# Patient Record
Sex: FEMALE | Race: WHITE | HISPANIC OR LATINO | Employment: OTHER | URBAN - METROPOLITAN AREA
[De-identification: names, ages, dates, MRNs, and addresses within clinical notes are randomized per-mention and may not be internally consistent; named-entity substitution may affect disease eponyms.]

---

## 2017-03-07 ENCOUNTER — ALLSCRIPTS OFFICE VISIT (OUTPATIENT)
Dept: OTHER | Facility: OTHER | Age: 82
End: 2017-03-07

## 2017-09-12 ENCOUNTER — ALLSCRIPTS OFFICE VISIT (OUTPATIENT)
Dept: OTHER | Facility: OTHER | Age: 82
End: 2017-09-12

## 2018-01-12 VITALS — WEIGHT: 107.5 LBS | DIASTOLIC BLOOD PRESSURE: 82 MMHG | SYSTOLIC BLOOD PRESSURE: 112 MMHG | HEART RATE: 88 BPM

## 2018-01-13 VITALS
HEART RATE: 76 BPM | HEIGHT: 59 IN | DIASTOLIC BLOOD PRESSURE: 80 MMHG | SYSTOLIC BLOOD PRESSURE: 140 MMHG | WEIGHT: 102.38 LBS | BODY MASS INDEX: 20.64 KG/M2

## 2018-04-20 RX ORDER — MEMANTINE HYDROCHLORIDE 28 MG/1
CAPSULE, EXTENDED RELEASE ORAL
COMMUNITY
End: 2019-10-27 | Stop reason: HOSPADM

## 2018-04-20 RX ORDER — GABAPENTIN 300 MG/1
1 CAPSULE ORAL
Status: ON HOLD | COMMUNITY
End: 2019-10-25 | Stop reason: ALTCHOICE

## 2018-04-20 RX ORDER — EZETIMIBE 10 MG/1
TABLET ORAL
Refills: 0 | COMMUNITY
Start: 2018-02-13 | End: 2021-01-01 | Stop reason: HOSPADM

## 2018-04-20 RX ORDER — MEMANTINE HYDROCHLORIDE 28 MG/1
CAPSULE, EXTENDED RELEASE ORAL
Refills: 0 | COMMUNITY
Start: 2018-02-15 | End: 2019-10-27 | Stop reason: HOSPADM

## 2018-04-20 RX ORDER — MIRABEGRON 25 MG/1
TABLET, FILM COATED, EXTENDED RELEASE ORAL
Refills: 0 | COMMUNITY
Start: 2018-02-06 | End: 2020-01-01 | Stop reason: HOSPADM

## 2018-04-20 RX ORDER — LEVOTHYROXINE SODIUM 0.05 MG/1
TABLET ORAL
Refills: 0 | COMMUNITY
Start: 2018-01-11 | End: 2021-01-01 | Stop reason: HOSPADM

## 2018-04-20 RX ORDER — CARBIDOPA, LEVODOPA AND ENTACAPONE 37.5; 200; 15 MG/1; MG/1; MG/1
TABLET, FILM COATED ORAL
Refills: 0 | COMMUNITY
Start: 2018-03-26 | End: 2020-01-01 | Stop reason: HOSPADM

## 2018-04-20 RX ORDER — LOSARTAN POTASSIUM 100 MG/1
1 TABLET ORAL DAILY
COMMUNITY
End: 2019-10-27 | Stop reason: HOSPADM

## 2018-04-20 RX ORDER — ASPIRIN 81 MG/1
1 TABLET ORAL DAILY
COMMUNITY
Start: 2015-02-24 | End: 2021-01-01 | Stop reason: HOSPADM

## 2018-04-20 RX ORDER — LOSARTAN POTASSIUM 50 MG/1
50 TABLET ORAL DAILY
Refills: 0 | COMMUNITY
Start: 2018-01-11 | End: 2021-01-01 | Stop reason: HOSPADM

## 2018-04-20 RX ORDER — GABAPENTIN 300 MG/1
CAPSULE ORAL
Refills: 0 | COMMUNITY
Start: 2018-01-11 | End: 2018-04-20

## 2018-04-20 RX ORDER — LINACLOTIDE 145 UG/1
CAPSULE, GELATIN COATED ORAL
Refills: 0 | COMMUNITY
Start: 2018-02-05 | End: 2018-04-24

## 2018-04-20 RX ORDER — ALENDRONATE SODIUM 70 MG/1
1 TABLET ORAL WEEKLY
COMMUNITY
End: 2021-01-01 | Stop reason: HOSPADM

## 2018-04-20 RX ORDER — LEVOTHYROXINE SODIUM 0.07 MG/1
1 TABLET ORAL DAILY
COMMUNITY
End: 2019-10-27 | Stop reason: HOSPADM

## 2018-04-24 ENCOUNTER — OFFICE VISIT (OUTPATIENT)
Dept: GASTROENTEROLOGY | Facility: CLINIC | Age: 83
End: 2018-04-24
Payer: MEDICARE

## 2018-04-24 VITALS
WEIGHT: 105.5 LBS | SYSTOLIC BLOOD PRESSURE: 126 MMHG | BODY MASS INDEX: 21.31 KG/M2 | DIASTOLIC BLOOD PRESSURE: 88 MMHG | HEART RATE: 71 BPM

## 2018-04-24 DIAGNOSIS — K59.01 SLOW TRANSIT CONSTIPATION: ICD-10-CM

## 2018-04-24 DIAGNOSIS — Z09 FOLLOW UP: Primary | ICD-10-CM

## 2018-04-24 PROCEDURE — 99214 OFFICE O/P EST MOD 30 MIN: CPT | Performed by: INTERNAL MEDICINE

## 2018-04-24 NOTE — PROGRESS NOTES
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 and her parkinsonism  Her daughter does not want her to take Linzess anymore and wants her to be treated with natural medications  1 we are stopping her Linzess    2 she can take Senokot or aloe if needed in the future    She does not need colonoscopy surveillance at her age     Reason:  Constipation    HPI:  She is an 49-year-old female here for slow motility constipation  This has been well managed for years with Linzess  She does still had irregular stools with this medication but no straining or hard stools and she was comfortable  Her daughter stopped it because she does not want her to be treated with too many chemicals per her were doing  She does not have any melena or hematochezia  She is eating  She has poor motility with a shuffling gait related to her Parkinson's disease  She appears to be comfortable and having bowel movements that are not hard or 2 large off of the medication  We did discuss natural treatments  We discussed no colonoscopy at her age  She is eating well  REVIEW OF SYSTEMS:     CONSTITUTIONAL: Denies any fever, chills, or rigors  Good appetite, and no recent weight loss  HEENT: No earache or tinnitus  Denies hearing loss or visual disturbances  CARDIOVASCULAR: No chest pain or palpitations  RESPIRATORY: Denies any cough, hemoptysis, shortness of breath or dyspnea on exertion  GASTROINTESTINAL: As noted in the History of Present Illness  GENITOURINARY: No problems with urination  Denies any hematuria or dysuria  NEUROLOGIC: No dizziness or vertigo, denies headaches  MUSCULOSKELETAL: Denies any muscle or joint pain  SKIN: Denies skin rashes or itching  ENDOCRINE: Denies excessive thirst  Denies intolerance to heat or cold    PSYCHOSOCIAL: Denies depression or anxiety  Denies any recent memory loss  Past Medical History:   Diagnosis Date    Constipation     Hypertension     Parkinson disease ([address] )       Past Surgical History:   Procedure Laterality Date    BASAL CELL CARCINOMA EXCISION      above lip    BREAST CYST EXCISION      COLONOSCOPY      HYSTERECTOMY      ROTATOR CUFF REPAIR Right      Social History     Social History    Marital status:      Spouse name: N/A    Number of children: N/A    Years of education: N/A     Occupational History    Not on file  Social History Main Topics    Smoking status: Never Smoker    Smokeless tobacco: Never Used    Alcohol use No    Drug use: No    Sexual activity: Not on file     Other Topics Concern    Not on file     Social History Narrative    No narrative on file     Family History   Problem Relation Age of Onset    Heart attack Mother      Patient has no known allergies  Current Outpatient Prescriptions   Medication Sig Dispense Refill    alendronate (FOSAMAX) 70 mg tablet Take 1 tablet by mouth once a week      aspirin (ASPIR-LOW) 81 mg EC tablet Take 1 tablet by mouth daily      carbidopa-levodopa-entacapone (STALEVO) 37 5-150-200 MG per tablet   0    gabapentin (NEURONTIN) 300 mg capsule Take 1 capsule by mouth      levothyroxine 50 mcg tablet   0    levothyroxine 75 mcg tablet Take 1 tablet by mouth daily      losartan (COZAAR) 100 MG tablet Take 1 tablet by mouth daily      losartan (COZAAR) 50 mg tablet Take 50 mg by mouth daily  0    Memantine HCl ER (NAMENDA XR) 28 MG CP24 Take by mouth      Mirabegron ER (MYRBETRIQ) 50 MG TB24 Take 1 tablet by mouth daily      MYRBETRIQ 25 MG TB24   0    NAMENDA XR 28 MG CP24   0    rotigotine (NEUPRO) 1 MG/24HR transdermal patch Place 1 patch on the skin daily      ZETIA 10 MG tablet   0     No current facility-administered medications for this visit          Blood pressure 126/88, pulse 71, weight 47 9 kg (105 lb 8 oz)     PHYSICAL EXAM:     General Appearance:   Alert, cooperative, no distress, appears stated age    HEENT:   Normocephalic, atraumatic, anicteric      Neck:  Supple, symmetrical, trachea midline, no adenopathy;    thyroid: no enlargement/tenderness/nodules; no carotid  bruit or JVD    Lungs:   Clear to auscultation bilaterally; no rales, rhonchi or wheezing; respirations unlabored    Heart[de-identified]   S1 and S2 normal; regular rate and rhythm; no murmur, rub, or gallop     Abdomen:   Soft, non-tender, non-distended; normal bowel sounds; no masses, no organomegaly    Genitalia:   Deferred    Rectal:   Deferred    Extremities:  No cyanosis, clubbing or edema    Pulses:  2+ and symmetric all extremities    Skin:  Skin color, texture, turgor normal, no rashes or lesions    Lymph nodes:  No palpable cervical, axillary or inguinal lymphadenopathy        Lab Results   Component Value Date    WBC 4 1 (L) 01/14/2015    HGB 15 5 01/14/2015    HCT 47 0 (H) 01/14/2015    MCV 96 01/14/2015     01/14/2015     Lab Results   Component Value Date    GLUCOSE 79 01/14/2015    CALCIUM 9 4 01/14/2015     01/14/2015    K 4 1 01/14/2015    CO2 30 9 01/14/2015     01/14/2015    BUN 17 01/14/2015    CREATININE 0 6 01/14/2015     Lab Results   Component Value Date    ALT 12 01/14/2015    AST 21 01/14/2015    ALKPHOS 103 01/14/2015    BILITOT 0 5 01/14/2015     No results found for: INR, PROTIME

## 2018-04-24 NOTE — LETTER
April 24, 2018     Jo Chou MD  [facility]  [address] [place] [address]    Patient: [facility]   YOB: 1934   Date of Visit: 4/24/2018       Dear Dr Roberson Corners:    Thank you for referring Ja Almeida to me for evaluation  Below are my notes for this consultation  If you have questions, please do not hesitate to call me  I look forward to following your patient along with you  Sincerely,        Roshan Atkinson MD        CC: No Recipients  Roshan Atkinson MD  4/24/2018 11:57 AM  Sign at close encounter  Follow-up Note - [facility]  [facility] 1934 80 y o  female     ASSESSMENT @ PLAN:   She is an 80-year-old female with 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 and her parkinsonism  Her daughter does not want her to take Linzess anymore and wants her to be treated with natural medications  1 we are stopping her Linzess    2 she can take Senokot or aloe if needed in the future    She does not need colonoscopy surveillance at her age     Reason:  Constipation    HPI:  She is an 80-year-old female here for slow motility constipation  This has been well managed for years with Linzess  She does still had irregular stools with this medication but no straining or hard stools and she was comfortable  Her daughter stopped it because she does not want her to be treated with too many chemicals per her were doing  She does not have any melena or hematochezia  She is eating  She has poor motility with a shuffling gait related to her Parkinson's disease  She appears to be comfortable and having bowel movements that are not hard or 2 large off of the medication  We did discuss natural treatments  We discussed no colonoscopy at her age  She is eating well  REVIEW OF SYSTEMS:     CONSTITUTIONAL: Denies any fever, chills, or rigors  Good appetite, and no recent weight loss    HEENT: No earache or tinnitus  Denies hearing loss or visual disturbances  CARDIOVASCULAR: No chest pain or palpitations  RESPIRATORY: Denies any cough, hemoptysis, shortness of breath or dyspnea on exertion  GASTROINTESTINAL: As noted in the History of Present Illness  GENITOURINARY: No problems with urination  Denies any hematuria or dysuria  NEUROLOGIC: No dizziness or vertigo, denies headaches  MUSCULOSKELETAL: Denies any muscle or joint pain  SKIN: Denies skin rashes or itching  ENDOCRINE: Denies excessive thirst  Denies intolerance to heat or cold  PSYCHOSOCIAL: Denies depression or anxiety  Denies any recent memory loss  Past Medical History:   Diagnosis Date    Constipation     Hypertension     Parkinson disease ([facility] [address] )       Past Surgical History:   Procedure Laterality Date    BASAL CELL CARCINOMA EXCISION      above lip    BREAST CYST EXCISION      COLONOSCOPY      HYSTERECTOMY      ROTATOR CUFF REPAIR Right      Social History     Social History    Marital status:      Spouse name: N/A    Number of children: N/A    Years of education: N/A     Occupational History    Not on file  Social History Main Topics    Smoking status: Never Smoker    Smokeless tobacco: Never Used    Alcohol use No    Drug use: No    Sexual activity: Not on file     Other Topics Concern    Not on file     Social History Narrative    No narrative on file     Family History   Problem Relation Age of Onset    Heart attack Mother      Patient has no known allergies    Current Outpatient Prescriptions   Medication Sig Dispense Refill    alendronate (FOSAMAX) 70 mg tablet Take 1 tablet by mouth once a week      aspirin (ASPIR-LOW) 81 mg EC tablet Take 1 tablet by mouth daily      carbidopa-levodopa-entacapone (STALEVO) 37 5-150-200 MG per tablet   0    gabapentin (NEURONTIN) 300 mg capsule Take 1 capsule by mouth      levothyroxine 50 mcg tablet   0    levothyroxine 75 mcg tablet Take 1 tablet by mouth daily      losartan (COZAAR) 100 MG tablet Take 1 tablet by mouth daily      losartan (COZAAR) 50 mg tablet Take 50 mg by mouth daily  0    Memantine HCl ER (NAMENDA XR) 28 MG CP24 Take by mouth      Mirabegron ER (MYRBETRIQ) 50 MG TB24 Take 1 tablet by mouth daily      MYRBETRIQ 25 MG TB24   0    NAMENDA XR 28 MG CP24   0    rotigotine (NEUPRO) 1 MG/24HR transdermal patch Place 1 patch on the skin daily      ZETIA 10 MG tablet   0     No current facility-administered medications for this visit  Blood pressure 126/88, pulse 71, weight 47 9 kg (105 lb 8 oz)  PHYSICAL EXAM:     General Appearance:   Alert, cooperative, no distress, appears stated age    HEENT:   Normocephalic, atraumatic, anicteric      Neck:  Supple, symmetrical, trachea midline, no adenopathy;    thyroid: no enlargement/tenderness/nodules; no carotid  bruit or JVD    Lungs:   Clear to auscultation bilaterally; no rales, rhonchi or wheezing; respirations unlabored    Heart[de-identified]   S1 and S2 normal; regular rate and rhythm; no murmur, rub, or gallop     Abdomen:   Soft, non-tender, non-distended; normal bowel sounds; no masses, no organomegaly    Genitalia:   Deferred    Rectal:   Deferred    Extremities:  No cyanosis, clubbing or edema    Pulses:  2+ and symmetric all extremities    Skin:  Skin color, texture, turgor normal, no rashes or lesions    Lymph nodes:  No palpable cervical, axillary or inguinal lymphadenopathy        Lab Results   Component Value Date    WBC 4 1 (L) 01/14/2015    HGB 15 5 01/14/2015    HCT 47 0 (H) 01/14/2015    MCV 96 01/14/2015     01/14/2015     Lab Results   Component Value Date    GLUCOSE 79 01/14/2015    CALCIUM 9 4 01/14/2015     01/14/2015    K 4 1 01/14/2015    CO2 30 9 01/14/2015     01/14/2015    BUN 17 01/14/2015    CREATININE 0 6 01/14/2015     Lab Results   Component Value Date    ALT 12 01/14/2015    AST 21 01/14/2015    ALKPHOS 103 01/14/2015    BILITOT 0 5 01/14/2015 No results found for: INR, PROTIME

## 2019-10-25 ENCOUNTER — APPOINTMENT (EMERGENCY)
Dept: RADIOLOGY | Facility: HOSPITAL | Age: 84
End: 2019-10-25
Payer: MEDICARE

## 2019-10-25 ENCOUNTER — HOSPITAL ENCOUNTER (EMERGENCY)
Facility: HOSPITAL | Age: 84
End: 2019-10-25
Attending: EMERGENCY MEDICINE | Admitting: EMERGENCY MEDICINE
Payer: MEDICARE

## 2019-10-25 ENCOUNTER — APPOINTMENT (EMERGENCY)
Dept: CT IMAGING | Facility: HOSPITAL | Age: 84
End: 2019-10-25
Payer: MEDICARE

## 2019-10-25 ENCOUNTER — HOSPITAL ENCOUNTER (INPATIENT)
Facility: HOSPITAL | Age: 84
LOS: 2 days | Discharge: HOME | DRG: 158 | End: 2019-10-27
Attending: SURGERY | Admitting: SURGERY
Payer: MEDICARE

## 2019-10-25 VITALS
HEART RATE: 83 BPM | SYSTOLIC BLOOD PRESSURE: 105 MMHG | BODY MASS INDEX: 19.15 KG/M2 | TEMPERATURE: 98.2 F | OXYGEN SATURATION: 95 % | RESPIRATION RATE: 15 BRPM | WEIGHT: 94.8 LBS | DIASTOLIC BLOOD PRESSURE: 63 MMHG

## 2019-10-25 DIAGNOSIS — S02.85XA CLOSED FRACTURE OF ORBIT, INITIAL ENCOUNTER (HCC): ICD-10-CM
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
## 2019-10-25 DIAGNOSIS — S02.85XA ORBITAL FRACTURE (HCC): ICD-10-CM

## 2019-10-25 DIAGNOSIS — S02.92XA EXTENSIVE FACIAL FRACTURES (HCC): Primary | ICD-10-CM

## 2019-10-25 LAB
ANION GAP SERPL CALCULATED.3IONS-SCNC: 4 MMOL/L (ref 4–13)
ANION GAP SERPL CALCULATED.3IONS-SCNC: 8 MMOL/L (ref 4–13)
APTT PPP: 27 SECONDS (ref 23–37)
BASOPHILS # BLD MANUAL: 0 THOUSAND/UL (ref 0–0.1)
BASOPHILS NFR MAR MANUAL: 0 % (ref 0–1)
BUN SERPL-MCNC: 30 MG/DL (ref 5–25)
BUN SERPL-MCNC: 31 MG/DL (ref 5–25)
CALCIUM SERPL-MCNC: 8.7 MG/DL (ref 8.3–10.1)
CALCIUM SERPL-MCNC: 9 MG/DL (ref 8.3–10.1)
CHLORIDE SERPL-SCNC: 107 MMOL/L (ref 100–108)
CHLORIDE SERPL-SCNC: 112 MMOL/L (ref 100–108)
CO2 SERPL-SCNC: 28 MMOL/L (ref 21–32)
CO2 SERPL-SCNC: 29 MMOL/L (ref 21–32)
CREAT SERPL-MCNC: 0.85 MG/DL (ref 0.6–1.3)
CREAT SERPL-MCNC: 1.05 MG/DL (ref 0.6–1.3)
EOSINOPHIL # BLD MANUAL: 0 THOUSAND/UL (ref 0–0.4)
EOSINOPHIL NFR BLD MANUAL: 0 % (ref 0–6)
ERYTHROCYTE [DISTWIDTH] IN BLOOD BY AUTOMATED COUNT: 13.2 % (ref 11.6–15.1)
GFR SERPL CREATININE-BSD FRML MDRD: 49 ML/MIN/1.73SQ M
GFR SERPL CREATININE-BSD FRML MDRD: 63 ML/MIN/1.73SQ M
GLUCOSE SERPL-MCNC: 113 MG/DL (ref 65–140)
GLUCOSE SERPL-MCNC: 165 MG/DL (ref 65–140)
HCT VFR BLD AUTO: 42.6 % (ref 34.8–46.1)
HGB BLD-MCNC: 13.6 G/DL (ref 11.5–15.4)
INR PPP: 1.22 (ref 0.84–1.19)
LYMPHOCYTES # BLD AUTO: 1.03 THOUSAND/UL (ref 0.6–4.47)
LYMPHOCYTES # BLD AUTO: 11 % (ref 14–44)
MCH RBC QN AUTO: 32.9 PG (ref 26.8–34.3)
MCHC RBC AUTO-ENTMCNC: 31.9 G/DL (ref 31.4–37.4)
MCV RBC AUTO: 103 FL (ref 82–98)
MONOCYTES # BLD AUTO: 0.19 THOUSAND/UL (ref 0–1.22)
MONOCYTES NFR BLD: 2 % (ref 4–12)
NEUTROPHILS # BLD MANUAL: 8.12 THOUSAND/UL (ref 1.85–7.62)
NEUTS SEG NFR BLD AUTO: 87 % (ref 43–75)
NRBC BLD AUTO-RTO: 0 /100 WBCS
PLATELET # BLD AUTO: 188 THOUSANDS/UL (ref 149–390)
PLATELET BLD QL SMEAR: ADEQUATE
PMV BLD AUTO: 9.9 FL (ref 8.9–12.7)
POTASSIUM SERPL-SCNC: 4.2 MMOL/L (ref 3.5–5.3)
POTASSIUM SERPL-SCNC: 5.2 MMOL/L (ref 3.5–5.3)
PROTHROMBIN TIME: 15 SECONDS (ref 11.6–14.5)
RBC # BLD AUTO: 4.13 MILLION/UL (ref 3.81–5.12)
SODIUM SERPL-SCNC: 144 MMOL/L (ref 136–145)
SODIUM SERPL-SCNC: 144 MMOL/L (ref 136–145)
TOTAL CELLS COUNTED SPEC: 100
WBC # BLD AUTO: 9.33 THOUSAND/UL (ref 4.31–10.16)

## 2019-10-25 PROCEDURE — 70450 CT HEAD/BRAIN W/O DYE: CPT

## 2019-10-25 PROCEDURE — 85027 COMPLETE CBC AUTOMATED: CPT | Performed by: EMERGENCY MEDICINE

## 2019-10-25 PROCEDURE — 80048 BASIC METABOLIC PNL TOTAL CA: CPT | Performed by: EMERGENCY MEDICINE

## 2019-10-25 PROCEDURE — 85730 THROMBOPLASTIN TIME PARTIAL: CPT | Performed by: EMERGENCY MEDICINE

## 2019-10-25 PROCEDURE — 99222 1ST HOSP IP/OBS MODERATE 55: CPT | Performed by: SURGERY

## 2019-10-25 PROCEDURE — 70486 CT MAXILLOFACIAL W/O DYE: CPT

## 2019-10-25 PROCEDURE — 1124F ACP DISCUSS-NO DSCNMKR DOCD: CPT | Performed by: SURGERY

## 2019-10-25 PROCEDURE — 73060 X-RAY EXAM OF HUMERUS: CPT

## 2019-10-25 PROCEDURE — 99285 EMERGENCY DEPT VISIT HI MDM: CPT | Performed by: EMERGENCY MEDICINE

## 2019-10-25 PROCEDURE — 85610 PROTHROMBIN TIME: CPT | Performed by: EMERGENCY MEDICINE

## 2019-10-25 PROCEDURE — 99285 EMERGENCY DEPT VISIT HI MDM: CPT

## 2019-10-25 PROCEDURE — 73130 X-RAY EXAM OF HAND: CPT

## 2019-10-25 PROCEDURE — 72125 CT NECK SPINE W/O DYE: CPT

## 2019-10-25 PROCEDURE — 36415 COLL VENOUS BLD VENIPUNCTURE: CPT | Performed by: EMERGENCY MEDICINE

## 2019-10-25 PROCEDURE — 99222 1ST HOSP IP/OBS MODERATE 55: CPT | Performed by: INTERNAL MEDICINE

## 2019-10-25 PROCEDURE — 73070 X-RAY EXAM OF ELBOW: CPT

## 2019-10-25 PROCEDURE — 85007 BL SMEAR W/DIFF WBC COUNT: CPT | Performed by: EMERGENCY MEDICINE

## 2019-10-25 RX ORDER — BACITRACIN, NEOMYCIN, POLYMYXIN B 400; 3.5; 5 [USP'U]/G; MG/G; [USP'U]/G
1 OINTMENT TOPICAL ONCE
Status: DISCONTINUED | OUTPATIENT
Start: 2019-10-25 | End: 2019-10-25 | Stop reason: HOSPADM

## 2019-10-25 RX ORDER — ACETAMINOPHEN 325 MG/1
975 TABLET ORAL EVERY 8 HOURS SCHEDULED
Status: DISCONTINUED | OUTPATIENT
Start: 2019-10-25 | End: 2019-10-27 | Stop reason: HOSPADM

## 2019-10-25 RX ORDER — LEVOTHYROXINE SODIUM 0.07 MG/1
75 TABLET ORAL DAILY
Status: DISCONTINUED | OUTPATIENT
Start: 2019-10-26 | End: 2019-10-27 | Stop reason: HOSPADM

## 2019-10-25 RX ORDER — ONDANSETRON 2 MG/ML
4 INJECTION INTRAMUSCULAR; INTRAVENOUS EVERY 4 HOURS PRN
Status: DISCONTINUED | OUTPATIENT
Start: 2019-10-25 | End: 2019-10-27 | Stop reason: HOSPADM

## 2019-10-25 RX ORDER — LOSARTAN POTASSIUM 50 MG/1
100 TABLET ORAL DAILY
Status: DISCONTINUED | OUTPATIENT
Start: 2019-10-26 | End: 2019-10-27 | Stop reason: HOSPADM

## 2019-10-25 RX ORDER — OXYCODONE HYDROCHLORIDE 5 MG/1
5 TABLET ORAL EVERY 4 HOURS PRN
Status: DISCONTINUED | OUTPATIENT
Start: 2019-10-25 | End: 2019-10-27 | Stop reason: HOSPADM

## 2019-10-25 RX ORDER — SODIUM CHLORIDE, SODIUM GLUCONATE, SODIUM ACETATE, POTASSIUM CHLORIDE, MAGNESIUM CHLORIDE, SODIUM PHOSPHATE, DIBASIC, AND POTASSIUM PHOSPHATE .53; .5; .37; .037; .03; .012; .00082 G/100ML; G/100ML; G/100ML; G/100ML; G/100ML; G/100ML; G/100ML
50 INJECTION, SOLUTION INTRAVENOUS CONTINUOUS
Status: DISCONTINUED | OUTPATIENT
Start: 2019-10-25 | End: 2019-10-26

## 2019-10-25 RX ORDER — CARBIDOPA, LEVODOPA AND ENTACAPONE 37.5; 200; 15 MG/1; MG/1; MG/1
1 TABLET, FILM COATED ORAL DAILY
Status: DISCONTINUED | OUTPATIENT
Start: 2019-10-26 | End: 2019-10-26 | Stop reason: CLARIF

## 2019-10-25 RX ORDER — OXYBUTYNIN CHLORIDE 10 MG/1
10 TABLET, EXTENDED RELEASE ORAL DAILY
Status: DISCONTINUED | OUTPATIENT
Start: 2019-10-26 | End: 2019-10-27 | Stop reason: HOSPADM

## 2019-10-25 RX ORDER — HYDROMORPHONE HCL/PF 1 MG/ML
0.2 SYRINGE (ML) INJECTION EVERY 4 HOURS PRN
Status: DISCONTINUED | OUTPATIENT
Start: 2019-10-25 | End: 2019-10-27 | Stop reason: HOSPADM

## 2019-10-25 RX ORDER — POLYETHYLENE GLYCOL 3350 17 G/17G
17 POWDER, FOR SOLUTION ORAL DAILY PRN
Status: DISCONTINUED | OUTPATIENT
Start: 2019-10-25 | End: 2019-10-27 | Stop reason: HOSPADM

## 2019-10-25 RX ORDER — MEMANTINE HYDROCHLORIDE 28 MG/1
14 CAPSULE, EXTENDED RELEASE ORAL DAILY
COMMUNITY
Start: 2019-04-02 | End: 2021-01-01 | Stop reason: HOSPADM

## 2019-10-25 RX ORDER — AMOXICILLIN 250 MG
1 CAPSULE ORAL 2 TIMES DAILY
Status: DISCONTINUED | OUTPATIENT
Start: 2019-10-25 | End: 2019-10-27 | Stop reason: HOSPADM

## 2019-10-25 RX ORDER — DOCUSATE SODIUM 100 MG/1
100 CAPSULE, LIQUID FILLED ORAL 2 TIMES DAILY
Status: DISCONTINUED | OUTPATIENT
Start: 2019-10-25 | End: 2019-10-27 | Stop reason: HOSPADM

## 2019-10-25 RX ORDER — OXYCODONE HYDROCHLORIDE 5 MG/1
2.5 TABLET ORAL EVERY 4 HOURS PRN
Status: DISCONTINUED | OUTPATIENT
Start: 2019-10-25 | End: 2019-10-27 | Stop reason: HOSPADM

## 2019-10-25 RX ADMIN — SODIUM CHLORIDE, SODIUM GLUCONATE, SODIUM ACETATE, POTASSIUM CHLORIDE AND MAGNESIUM CHLORIDE 50 ML/HR: 526; 502; 368; 37; 30 INJECTION, SOLUTION INTRAVENOUS at 21:53

## 2019-10-25 RX ADMIN — SENNOSIDES AND DOCUSATE SODIUM 1 TABLET: 8.6; 5 TABLET ORAL at 21:52

## 2019-10-25 RX ADMIN — DOCUSATE SODIUM 100 MG: 100 CAPSULE, LIQUID FILLED ORAL at 21:52

## 2019-10-25 RX ADMIN — ACETAMINOPHEN 975 MG: 325 TABLET ORAL at 21:52

## 2019-10-25 RX ADMIN — Medication 1 APPLICATION: at 11:57

## 2019-10-25 NOTE — ED NOTES
Report given to Adriana Medeiros RN, [facility]        Nadira Head RN  10/25/19 1945

## 2019-10-25 NOTE — ED PROCEDURE NOTE
PROCEDURE  Laceration repair  Date/Time: 10/25/2019 3:04 PM  Performed by: Junior Wolf MD  Authorized by: Junior Wolf MD   Consent: Verbal consent obtained    Risks and benefits: risks, benefits and alternatives were discussed  Consent given by: guardian and patient  Body area: head/neck  Location details: left eyebrow  Laceration length: 3 5 cm  Foreign bodies: no foreign bodies  Anesthesia: local infiltration    Anesthesia:  Local Anesthetic: LET (lido,epi,tetracaine)      Procedure Details:  Irrigation solution: saline  Irrigation method: syringe  Amount of cleaning: standard  Debridement: none  Degree of undermining: none  Skin closure: 5-0 nylon  Number of sutures: 6  Technique: simple  Approximation: close  Approximation difficulty: simple  Dressing: antibiotic ointment           Junior Wolf MD  10/25/19 1733

## 2019-10-25 NOTE — ED PROVIDER NOTES
History  Chief Complaint   Patient presents with    Fall     Per pt's mother - pt fell out of bed yesterday, another fall today from standing position, striking head on hardwood floor  Pt denies LOC, + thinners  60-year-old lady with known history of Parkinson's who has gait and balance issues so is prone to falls, the daughter turned around to do something and was right by her mom and she lost her balance and fell and hit the hard ground in the bathroom, unsure if that she struck her head on the vanity  She did not lose consciousness and remembers everything that happened  Complains of left arm pain  Denies any blurry vision  The patient is on aspirin  History provided by:  Relative  History limited by:  Age  Fall   Mechanism of injury: fall    Injury location:  Head/neck, face and shoulder/arm  Facial injury location:  L eyebrow  Shoulder/arm injury location:  L upper arm, R elbow and L hand  Incident location:  Home  Time since incident:  3 hours  Fall:     Fall occurred:  Standing    Impact surface:  Hard floor    Point of impact:  Head  Prior to arrival data:     Bystander interventions:  None    Patient ambulatory at scene: yes      Blood loss:  Minimal    Responsiveness at scene:  Alert    Orientation at scene:  Person, place, situation and time    Loss of consciousness: no      Amnesic to event: no      Airway interventions:  None    IV access status:  None    IO access:  None    Fluids administered:  None    Cardiac interventions:  None    Medications administered:  None  Associated symptoms: no abdominal pain, no back pain, no blindness, no chest pain, no difficulty breathing, no headaches, no loss of consciousness, no neck pain and no vomiting    Risk factors: anticoagulation therapy (on ASA)        Prior to Admission Medications   Prescriptions Last Dose Informant Patient Reported? Taking?    MYRBETRIQ 25 MG TB24  Self Yes No   Memantine HCl ER (NAMENDA XR) 28 MG CP24  Self Yes No   Sig: Take by mouth   Mirabegron ER (MYRBETRIQ) 50 MG TB24  Self Yes No   Sig: Take 1 tablet by mouth daily   NAMENDA XR 28 MG CP24  Self Yes No   ZETIA 10 MG tablet  Self Yes No   alendronate (FOSAMAX) 70 mg tablet  Self Yes No   Sig: Take 1 tablet by mouth once a week   aspirin (ASPIR-LOW) 81 mg EC tablet  Self Yes No   Sig: Take 1 tablet by mouth daily   carbidopa-levodopa-entacapone (STALEVO) 37 5-150-200 MG per tablet  Self Yes No   gabapentin (NEURONTIN) 300 mg capsule  Self Yes No   Sig: Take 1 capsule by mouth   levothyroxine 50 mcg tablet  Self Yes No   levothyroxine 75 mcg tablet  Self Yes No   Sig: Take 1 tablet by mouth daily   losartan (COZAAR) 100 MG tablet  Self Yes No   Sig: Take 1 tablet by mouth daily   losartan (COZAAR) 50 mg tablet  Self Yes No   Sig: Take 50 mg by mouth daily   rotigotine (NEUPRO) 1 MG/24HR transdermal patch  Self Yes No   Sig: Place 1 patch on the skin daily      Facility-Administered Medications: None       Past Medical History:   Diagnosis Date    Constipation     Hypertension     Osteoporosis     Parkinson disease ([facility] [address] )        Past Surgical History:   Procedure Laterality Date    BASAL CELL CARCINOMA EXCISION      above lip    BREAST CYST EXCISION      COLONOSCOPY      HYSTERECTOMY      ROTATOR CUFF REPAIR Right        Family History   Problem Relation Age of Onset    Heart attack Mother      I have reviewed and agree with the history as documented  Social History     Tobacco Use    Smoking status: Never Smoker    Smokeless tobacco: Never Used   Substance Use Topics    Alcohol use: No    Drug use: No        Review of Systems   Eyes: Negative for blindness  Cardiovascular: Negative for chest pain  Gastrointestinal: Negative for abdominal pain and vomiting  Musculoskeletal: Negative for back pain and neck pain  Neurological: Negative for loss of consciousness and headaches  All other systems reviewed and are negative        Physical Exam  Physical Exam Constitutional: She is oriented to person, place, and time  She appears well-developed and well-nourished  HENT:   Head: Normocephalic  Head is with contusion (large left eye periorbital contusion and hematoma) and with laceration (left eyebrown laceration 3 5 cm)  Head is without raccoon's eyes, without Sanchez's sign, without right periorbital erythema and without left periorbital erythema  Mouth/Throat: Oropharynx is clear and moist    Eyes: Pupils are equal, round, and reactive to light  EOM are normal    Neck: Neck supple  Cardiovascular: Normal rate and regular rhythm  Pulmonary/Chest: Effort normal and breath sounds normal  No stridor  No respiratory distress  She has no wheezes  Abdominal: Soft  Bowel sounds are normal  She exhibits no distension  There is no tenderness  Musculoskeletal: She exhibits tenderness and deformity (left humeral arm skin tear wtih hematoma)  Right elbow: She exhibits swelling (right posterior elbow ecchymosis)  She exhibits no laceration  Left upper arm: She exhibits swelling (left humeral skin tear and hematoma) and laceration  Left hand: She exhibits tenderness (small phalanx contusion to left middle finger)  She exhibits no deformity and no swelling  Neurological: She is alert and oriented to person, place, and time  Skin: No pallor  Vitals reviewed        Vital Signs  ED Triage Vitals [10/25/19 1143]   Temperature Pulse Respirations Blood Pressure SpO2   98 2 °F (36 8 °C) 81 16 141/72 96 %      Temp Source Heart Rate Source Patient Position - Orthostatic VS BP Location FiO2 (%)   Oral Monitor Lying -- --      Pain Score       --           Vitals:    10/25/19 1345 10/25/19 1411 10/25/19 1436 10/25/19 1516   BP: 123/72 107/58 101/56 105/63   Pulse: 86 92 88 83   Patient Position - Orthostatic VS:  Lying Lying Lying         Visual Acuity  Visual Acuity      Most Recent Value   L Pupil Size (mm)  3   R Pupil Size (mm)  3          ED Medications  Medications   LET gel 1 application (1 application Topical Given 10/25/19 1157)       Diagnostic Studies  Results Reviewed     Procedure Component Value Units Date/Time    CBC and differential [[id number]]  (Abnormal) Collected:  10/25/19 1155    Lab Status:  Final result Specimen:  Blood from Arm, Right Updated:  10/25/19 1227     WBC 9 33 Thousand/uL      RBC 4 13 Million/uL      Hemoglobin 13 6 g/dL      Hematocrit 42 6 %       fL      MCH 32 9 pg      MCHC 31 9 g/dL      RDW 13 2 %      MPV 9 9 fL      Platelets 851 Thousands/uL      nRBC 0 /100 WBCs     Basic metabolic panel [[id number]]  (Abnormal) Collected:  10/25/19 1155    Lab Status:  Final result Specimen:  Blood from Arm, Right Updated:  10/25/19 1224     Sodium 144 mmol/L      Potassium 5 2 mmol/L      Chloride 107 mmol/L      CO2 29 mmol/L      ANION GAP 8 mmol/L      BUN 30 mg/dL      Creatinine 0 85 mg/dL      Glucose 113 mg/dL      Calcium 9 0 mg/dL      eGFR 63 ml/min/1 73sq m     Narrative:       [place] guidelines for Chronic Kidney Disease (CKD):     Stage 1 with normal or high GFR (GFR > 90 mL/min/1 73 square meters)    Stage 2 Mild CKD (GFR = 60-89 mL/min/1 73 square meters)    Stage 3A Moderate CKD (GFR = 45-59 mL/min/1 73 square meters)    Stage 3B Moderate CKD (GFR = 30-44 mL/min/1 73 square meters)    Stage 4 Severe CKD (GFR = 15-29 mL/min/1 73 square meters)    Stage 5 End Stage CKD (GFR <15 mL/min/1 73 square meters)  Note: GFR calculation is accurate only with a steady state creatinine                 XR humerus LEFT   Final Result by Walter Vázquez MD (10/25 1445)      Small ossific fragment noted adjacent to the lateral aspect of the glenoid is age-indeterminate and should be correlated with focal point tenderness in this region  High riding left humeral head which can be seen in setting of chronic rotator cuff tear              Workstation performed: [id number]         XR hand 3+ views LEFT   Final Result by James Nava MD (10/25 1441)      No acute fracture or dislocation identified  Osteopenic bones  Workstation performed: [id number]         XR elbow 2 vw left   Final Result by James Nava MD (10/25 1442)      No acute osseous abnormality  Workstation performed: [id number]         CT cervical spine without contrast   Final Result by Mely Barone MD (10/25 1240)      No acute compression collapse of the vertebra seen   Multiple facial bone fractures, correlate with CT of the facial bones   Left sided pneumo orbit                Workstation performed: [id number]         CT head without contrast   Final Result by Mely Barone MD (10/25 1234)      No acute intracranial hemorrhage seen   Bilateral maxillary hemosinus with the left pneumo orbit, correlate with CT of the facial bone report                  Workstation performed: [id number]         CT facial bones without contrast   Final Result by Mely Barone MD (10/25 1315)   Multiple fractures of the face       Fracture of the anterior wall of the left maxillary sinus with depression of about 6 mm left maxillary hemosinus  Fracture of the lateral wall of the left orbit at the level of  zygomaticofrontal region and zygomaticosphenoid region without significant significance displaced      Fracture of the left orbital floor with depression of anterior to middle one 3rd of the orbital floor by about 4 mm with fracture through the left orbital floor      There is fracture of the lateral left maxillary wall, Which involves the anterolateral margin of the nasal fossa above the maxillary alveolar ridge  The nasal spine of the right maxilla is intact  This Fracture also involves the nasolacrimal duct and    there is associated fracture of the left pterygoid plates suggesting [place] 1injury      Left pneumo orbit          Fracture through the inferior aspect of the left maxilla which involves the lateral nasal wall, medial left maxillary sinus wall, lateral left maxillary sinus sinus wall       Opacification of the right maxillary sinus which demonstrates hyperdensity within it couple with the thickened wall, may be due to chronic sinus disease with the noninvasive fungal /allergic sinus disease, right maxillary hemosinus is another less likely    possibility    however there is no displaced fracture seen      The study was marked in EPIC for immediate notification  Workstation performed: [id number]                    Procedures  Procedures       ED Course                               MDM  Number of Diagnoses or Management Options  Fall, initial encounter: new and requires workup  Orbital fracture: new and requires workup     Amount and/or Complexity of Data Reviewed  Clinical lab tests: ordered and reviewed  Tests in the radiology section of CPT®: ordered and reviewed  Discuss the patient with other providers: yes (D/w trauma and recommend patient transfer down there for eval with fractures )    Risk of Complications, Morbidity, and/or Mortality  Presenting problems: high    Patient Progress  Patient progress: stable (Please see separate laceration repair note )      Disposition  Final diagnoses:   Fall, initial encounter   Orbital fracture     Time reflects when diagnosis was documented in both MDM as applicable and the Disposition within this note     Time User Action Codes Description Comment    10/25/2019  2:22 PM Collin neck M Add [W19  EWXW] Fall, initial encounter     10/25/2019  2:22 PM Arely Avendano Orbital fracture       ED Disposition     ED Disposition Condition Date/Time Comment    Transfer to Another Facility-In Network  Fri Oct 25, 2019  2:22  [address] should be transferred out to \[facility]\""          MD Documentation      Most Recent Value   Patient Condition  The patient has been stabilized such that within reasonable medical probability, no material deterioration of the patient condition or the condition of the unborn child(bhavya) is likely to result from the transfer   Reason for Transfer  Level of Care needed not available at this facility [Trauma/opthalmology]   Benefits of Transfer  Specialized equipment and/or services available at the receiving facility (Include comment)________________________ [trauma/opthalmology]   Risks of Transfer  Potential for delay in receiving treatment, Potential deterioration of medical condition, Loss of IV, Increased discomfort during transfer   Accepting Physician  [address], [address]    (Name & Tel number)  PACS   Transported by (Company and Unit #)  [employer]   Sending MD  [facility]   Provider Certification  General risk, such as traffic hazards, adverse weather conditions, rough terrain or turbulence, possible failure of equipment (including vehicle or aircraft), or consequences of actions of persons outside the control of the transport personnel      RN Documentation      [address] Name, [address]   [facility]   Bed Assignment  [facility]-ED    (Name & Tel number)  PACS   Report Given to  April Vazquez by [facility] and Unit #)  [facility]      Follow-up Information    None         Discharge Medication List as of 10/25/2019  3:42 PM      CONTINUE these medications which have NOT CHANGED    Details   alendronate (FOSAMAX) 70 mg tablet Take 1 tablet by mouth once a week, Historical Med      aspirin (ASPIR-LOW) 81 mg EC tablet Take 1 tablet by mouth daily, Starting Tue 2/24/2015, Historical Med      carbidopa-levodopa-entacapone (STALEVO) 37 5-150-200 MG per tablet Starting Mon 3/26/2018, Historical Med      gabapentin (NEURONTIN) 300 mg capsule Take 1 capsule by mouth, Historical Med      !! levothyroxine 50 mcg tablet Starting Thu 1/11/2018, Historical Med      !! levothyroxine 75 mcg tablet Take 1 tablet by mouth daily, Historical Med      !! losartan (COZAAR) 100 MG tablet Take 1 tablet by mouth daily, Historical Med      !! losartan (COZAAR) 50 mg tablet Take 50 mg by mouth daily, Starting Thu 1/11/2018, Historical Med      !! Memantine HCl ER (NAMENDA XR) 28 MG CP24 Take by mouth, Historical Med      !! Mirabegron ER (MYRBETRIQ) 50 MG TB24 Take 1 tablet by mouth daily, Historical Med      !! MYRBETRIQ 25 MG TB24 Starting Tue 2/6/2018, Historical Med      !! NAMENDA XR 28 MG CP24 Starting Thu 2/15/2018, Historical Med      rotigotine (NEUPRO) 1 MG/24HR transdermal patch Place 1 patch on the skin daily, Historical Med      ZETIA 10 MG tablet Starting Tue 2/13/2018, Historical Med       !! - Potential duplicate medications found  Please discuss with provider  No discharge procedures on file      ED Provider  Electronically Signed by           Rose Holt MD  10/25/19 3431

## 2019-10-25 NOTE — H&P
H&P Exam - Trauma   Elia Santizo 80 y o  female MRN: [id number]  Unit/Bed#: ED 28 Encounter: [id number]    Assessment/Plan   Trauma Alert: Other transfer from [address] [place], [place] [address]vd: Ambulance  Trauma Team: Attending Esmer Service and Residents Tamara  Consultants: Oral Maxillofacial, Gertiatrics, PT/OT, CM    Trauma Active Problems: multiple facial fractures noted on CT scan    Trauma Plan: Consult OMFS, geriatrics, PT/OT, CM    Chief Complaint: L arm and face pain    History of Present Illness   HPI:  Elia Santizo is a 80 y o  female who presents with her daughter as a trauma transfer from [facility] after a fall at home  She lives with her daughter who states that earlier today her mother was walking to the bathroom and slipped on the linoleum and fell forward striking her head she believes on the floor  Daughter states that she did not lose consciousness  Patient initially presented to [facility] where she received CT scan and repair of facial laceration  CT scan showed multiple facial fractures and was transferred to [facility] for trauma and OMFS evaluation  Mechanism:Fall    Review of Systems   Musculoskeletal: Positive for arthralgias (L arm pain and R elbow pain)  Skin: Positive for wound (L arm, R elbow and L periorbital area)  Neurological: Positive for headaches  Negative for syncope  All other systems reviewed and are negative        Historical Information       Past Medical History:   Diagnosis Date    Constipation     Hypertension     Osteoporosis     Parkinson disease ([facility] [address] )      Past Surgical History:   Procedure Laterality Date    BASAL CELL CARCINOMA EXCISION      above lip    BREAST CYST EXCISION      COLONOSCOPY      HYSTERECTOMY      ROTATOR CUFF REPAIR Right      Social History   Social History     Substance and Sexual Activity   Alcohol Use No     Social History     Substance and Sexual Activity   Drug Use No     Social History     Tobacco Use   Smoking Status Never Smoker   Smokeless Tobacco Never Used       There is no immunization history on file for this patient  Last Tetanus: today  Family History: Non-contributory      Meds/Allergies   all current active meds have been reviewed    No Known Allergies      PHYSICAL EXAM        Objective   Vitals:   First set: Temperature: 98 1 °F (36 7 °C) (10/25/19 1642)  Pulse: 91 (10/25/19 1642)  Respirations: 20 (10/25/19 1642)  Blood Pressure: 133/62 (10/25/19 1642)    Primary Survey:   (A) Airway: intact  (B) Breathing: equal B/L  (C) Circulation: Pulses:   carotid  2/4, pedal  2/4, radial  2/4 and femoral  2/4  (D) Disabliity:  GCS Total:  15  (E) Expose:  Completed    Secondary Survey: (Click on Physical Exam tab above)  Physical Exam   Constitutional: She is oriented to person, place, and time  She appears well-developed and well-nourished  No distress  HENT:   Head: Normocephalic and atraumatic  Right Ear: External ear normal    Left Ear: External ear normal    Nose: Nose normal    Eyes: Pupils are equal, round, and reactive to light  Conjunctivae and EOM are normal  Right eye exhibits no discharge  Left eye exhibits no discharge  No scleral icterus  Right eye exhibits normal extraocular motion and no nystagmus  Left eye exhibits normal extraocular motion and no nystagmus  Periorbital erythema and ecchymosis of L eye without pain with Extra occular eye movement   Neck: Normal range of motion  Neck supple  No JVD present  No tracheal deviation present  Cardiovascular: Normal rate, regular rhythm, normal heart sounds and intact distal pulses  Exam reveals no gallop and no friction rub  No murmur heard  Pulmonary/Chest: Effort normal and breath sounds normal  No stridor  No respiratory distress  She has no wheezes  She has no rales  Abdominal: Soft  Bowel sounds are normal  She exhibits no distension and no mass  There is no tenderness  There is no guarding  Musculoskeletal: Normal range of motion   She exhibits no edema, tenderness or deformity  Neurological: She is alert and oriented to person, place, and time  No cranial nerve deficit or sensory deficit  She exhibits normal muscle tone  Skin: Skin is warm and dry  Capillary refill takes less than 2 seconds  Abrasion, ecchymosis and laceration noted  No rash noted  She is not diaphoretic  No erythema  No pallor  Skin abrasion to L anterior humerus    Ecchymosis to R elbow    Repaired Laceration inferior to L eye   Psychiatric: She has a normal mood and affect  Her behavior is normal  Judgment and thought content normal    Nursing note and vitals reviewed  Invasive Devices     Peripheral Intravenous Line            Peripheral IV 10/25/19 Right Antecubital less than 1 day                Lab Results:   Results Reviewed     Procedure Component Value Units Date/Time    Protime-INR [[id number]]     Lab Status:  No result Specimen:  Blood     APTT [[id number]]     Lab Status:  No result Specimen:  Blood     Basic metabolic panel [[id number]]     Lab Status:  No result Specimen:  Blood           Imaging/EKG Studies: Xr Humerus Left    Result Date: 10/25/2019  Impression: Small ossific fragment noted adjacent to the lateral aspect of the glenoid is age-indeterminate and should be correlated with focal point tenderness in this region  High riding left humeral head which can be seen in setting of chronic rotator cuff tear  Workstation performed: [id number]     Xr Elbow 2 Vw Left    Result Date: 10/25/2019  Impression: No acute osseous abnormality  Workstation performed: [id number]     Xr Hand 3+ Views Left    Result Date: 10/25/2019  Impression: No acute fracture or dislocation identified  Osteopenic bones   Workstation performed: [id number]     Ct Head Without Contrast    Result Date: 10/25/2019  Impression: No acute intracranial hemorrhage seen Bilateral maxillary hemosinus with the left pneumo orbit, correlate with CT of the facial bone report Workstation performed: [id number]     Ct Facial Bones Without Contrast    Result Date: 10/25/2019  Impression: Multiple fractures of the face Fracture of the anterior wall of the left maxillary sinus with depression of about 6 mm left maxillary hemosinus  Fracture of the lateral wall of the left orbit at the level of  zygomaticofrontal region and zygomaticosphenoid region without significant significance displaced Fracture of the left orbital floor with depression of anterior to middle one 3rd of the orbital floor by about 4 mm with fracture through the left orbital floor There is fracture of the lateral left maxillary wall, Which involves the anterolateral margin of the nasal fossa above the maxillary alveolar ridge  The nasal spine of the right maxilla is intact  This Fracture also involves the nasolacrimal duct and there is associated fracture of the left pterygoid plates suggesting [place] 1injury Left pneumo orbit  Fracture through the inferior aspect of the left maxilla which involves the lateral nasal wall, medial left maxillary sinus wall, lateral left maxillary sinus sinus wall Opacification of the right maxillary sinus which demonstrates hyperdensity within it couple with the thickened wall, may be due to chronic sinus disease with the noninvasive fungal /allergic sinus disease, right maxillary hemosinus is another less likely  possibility however there is no displaced fracture seen The study was marked in EPIC for immediate notification   Workstation performed: [id number]     Ct Cervical Spine Without Contrast    Result Date: 10/25/2019  Impression: No acute compression collapse of the vertebra seen Multiple facial bone fractures, correlate with CT of the facial bones Left sided pneumo orbit  Workstation performed: [id number]       Code Status: No Order  Advance Directive and Living Will:      Power of :    POLST:

## 2019-10-25 NOTE — CONSULTS
Consultation - Geriatric Medicine   Samuel Spring 80 y o  female MRN: [id number]  Unit/Bed#: [facility] 604-01 Encounter: [id number]      Assessment/Plan     Facial bone fractures  -including the following:  Anterior wall of left maxillary sinus, lateral wall of left orbit, lateral left maxillary wall involving anterolateral margin of nasal fossa above maxillary alveolar ridge involving July commode duct and pterygoid plates-likely [place] type 1 fracture  -OMFS consult pending  -continue acute pain control    Left-sided pneumo orbit  -awaiting ENT evaluation  -continue treatment per trauma    Acute pain due to trauma  -per patient's daughter bedside she does NOT under any circumstance want patient to have Oxycodone due to history of sensitivity, continue to encourage discussion of risk/benefitt  -encourage Tylenol 975 mg q 8 hours scheduled  -encourage nonpharmacologic adjunct including ice  -could consider lidocaine gel or lidocaine patch  -patient previously gabapentin as outpatient but per daughter was discontinued due to polypharmacy and she no longer takes this as outpatient     Ambulatory dysfunction with recurrent falls  -multifactorial including Parkinson's disease, cannot rule out autonomic dysregulation, consider checking orthostatic blood pressures  -PT and OT eval and treat  -at baseline patient does not use any walker or assistive device, only uses wheelchair for long distances  -continue fall precautions  -uses wheelchair for long distances, encourage use at all appropriate times    Osteoporosis  -currently on Fosamax weekly  -recommend checking vitamin-D level  -encourage at least 1000IU vitamin-D and 1200 mg calcium daily    Parkinson's disease  -currently maintained on Stevalo (carbidopa-levadopa-entecapone), Neupro, and Namenda  -continue outpatient follow-up with [facility] treatment providers  -continue fall precautions  -recommend PT and OT evaluation    Dementia  -likely secondary to Parkinson's, stage unclear, will need to perform cognitive evaluation  -recommend cognitive evaluation once pain controlled, may benefit from comprehensive geriatric assessment as outpatient  -maintain normal sleep-wake cycle  -encourage nutrition  -encourage adequate pain control  -monitor for fecal and urinary retention  -with any behavior changes 1st rule out metabolic derangements, or pain prior to administering medications to help with behaviors    Hypothyroidism  -last TSH 2 12, continue home levothyroxine dosing  -recommend close outpatient follow up with PCP    Impaired mastication  -requires use of dentures, encourage use of appropriate times    Impaired vision  -in part due to requires use of glasses, encourage use at all appropriate times     Deconditioning/debility/frailty  -albumin 3 8  -patient notes at least unintentional 10 lb weight loss in the past few months related to her Parkinson's disease, has been fruits nutritional supplements including boost and Ensure  -encourage nutritional supplements between meals  -recommend nutrition evaluation    Home medication review:   Confirmed with patients daughter at bedside who provided the list  Stevalo 18 75- mg, 1 by mouth b i d    Levothyroxine 50 mcg daily  Aspirin 81 mg daily  Yfhapj8gf/24H patch daily  Fosamax 70 mg once weekly  Namenda 14 mg daily  Candesartan 8 mg daily  Zetia 10 mg daily  * per patient's daughter she no longer takes gabapentin, telmisartan, or triamcinolone cream  ** dose differences exists between record and reported Stevalo dosing, and Namenda,     History of Present Illness   Physician Requesting Consult: Shazia Donovan MD  Reason for Consult / Principal Problem:  Fall  Hx and PE limited by: N/A  Additional history obtained from: Patients daughter at bedside    HPI: Dasha Agrawal is a 80y o  year old female with Parkinson's disease, dementia, hypothyroidism, hyperlipidemia, hyperglycemia, hypertension, osteoporosis, and frailty who is admitted to Trauma following mechanical fall at home resulting in multiple facial fractures  She is being seen in consultation by geriatric Medicine for fall, Parkinson's with recurrent falls, deconditioning, debility, and delirium prevention  At the time of exam the patient's daughter sitting at her bedside and provides most of the HPI is the patient is very fatigued and not conversant  The patient's daughter reports that Nadja Rasmussen was getting up to use the restroom and she tripped on the ground landing forward  Her daughter initially helped her get up and took her to a [facility] where they recommended she be evaluated in the emergency room prompting her visit from [facility]  Once at the [facility] can pursue transfer to [facility] for trauma evaluation  On exam the patient is lying in bed appears comfortable, she reports no acute complaints  The patient resides at home with her daughter who is her full-time care taker, does not use any ambulatory device at baseline except for wheelchair for long distances, does require the use of glasses and dentures, does not use hearing aids  Inpatient consult to Gerontology  Consult performed by: Davi Moore DO  Consult ordered by: Jamel Moore DO        Review of Systems   Constitutional: Negative for appetite change, chills and fever  HENT: Positive for facial swelling, sinus pain and voice change  Generalized facial pain   Eyes: Positive for visual disturbance (Due to swelling left eyelid)  Respiratory: Negative for cough, shortness of breath and wheezing  Cardiovascular: Negative for chest pain  Gastrointestinal: Negative for nausea and vomiting  Genitourinary: Negative  Musculoskeletal: Positive for arthralgias and gait problem  Skin: Negative  Neurological: Positive for tremors and weakness  Psychiatric/Behavioral: Negative  All other systems reviewed and are negative      Historical Information   Past Medical History:   Diagnosis Date    Constipation     Hypertension     Osteoporosis     Parkinson disease ([address] )      Past Surgical History:   Procedure Laterality Date    BASAL CELL CARCINOMA EXCISION      above lip    BREAST CYST EXCISION      COLONOSCOPY      HYSTERECTOMY      ROTATOR CUFF REPAIR Right      Social History   Social History     Substance and Sexual Activity   Alcohol Use No     Social History     Substance and Sexual Activity   Drug Use No     Social History     Tobacco Use   Smoking Status Never Smoker   Smokeless Tobacco Never Used     Family History:   Family History   Problem Relation Age of Onset    Heart attack Mother      Meds/Allergies   all current active meds have been reviewed    No Known Allergies    Objective   No intake or output data in the 24 hours ending 10/26/19 0011  Invasive Devices     Peripheral Intravenous Line            Peripheral IV 10/25/19 Right Antecubital less than 1 day              Physical Exam   Constitutional: Vital signs are normal  She appears well-developed  She appears cachectic  She has a sickly appearance  HENT:   Left periorbital ecchymosis with edema of upper lid  Mucous membranes dry   Eyes:   Right EOMI normal, left EOM unable to be visualized due to swelling   Neck: Neck supple  No tracheal deviation present  Cardiovascular: Normal rate  Murmur (Systolic) heard  Pulmonary/Chest: Effort normal and breath sounds normal  No respiratory distress  Abdominal: Soft  Bowel sounds are normal  She exhibits no distension  Musculoskeletal:   Reduced overall muscle mass and tone, significantly reduced subcutaneous fat notable on bilateral temples, clavicles, triceps, dorsum of both hands, and anterior shins bilaterally   Neurological:   Patient is awake and alert person and place   Skin: Skin is warm and dry  She is not diaphoretic  Psychiatric:   Flat affect withdrawn   Nursing note and vitals reviewed        Lab Results:   I have personally reviewed pertinent lab results including the following:  -TSH 2 12 ([facility])  -sodium 144, potassium 4 2, chloride 1 12, BUN 31, creatinine 1 05, GFR 49  -hemoglobin 13 6, hematocrit 42 6, WBC 9 33, platelets a 552    I have personally reviewed the following imaging study reports in PACS:  -CT spine cervical without contrast 10/25/2019 revealing no acute compression collapse of vertebrea seen, multiple facial bone fractures, left-sided normal orbit  -CT head without contrast 10/25/2019 revealed no acute intracranial hemorrhage  -CT facial bones 10/25/2019 revealed multiple facial bones, please see full report for details  -left humerus x-ray 10/25/2019 revealed small ossific fragment adjacent to the lateral aspect of the glenoid  -x-ray left head revealed no acute fracture dislocation, osteopenic bones noted  X-ray left elbow, no acute osseous abnormalities    Therapies:   PT:  Pending  OT:  Pending    VTE Prophylaxis: Sequential compression device (Venodyne)     Code Status: Level 1 - Full Code  Advance Directive and Living Will:      Power of :    POLST:      Family and Social Support:   Living Arrangements: Family members  Support Systems: Children;Family members  Assistance Needed: n/a  Type of Current Residence: Private residence  Current Home Care Services: No    Goals of Care:  Patient does not disclose

## 2019-10-25 NOTE — EMTALA/ACUTE CARE TRANSFER
[address] [facility] [address]  [address] [place] [address]  Dept: [phone]      EMTALA TRANSFER CONSENT    NAME Theda Babinski Dorita Stade                                         1934                              MRN [id number]    I have been informed of my rights regarding examination, treatment, and transfer   by Dr Michelle Haro MD    Benefits: Specialized equipment and/or services available at the receiving facility (Include comment)________________________(trauma/opthalmology)    Risks: Potential for delay in receiving treatment, Potential deterioration of medical condition, Loss of IV, Increased discomfort during transfer      Consent for Transfer:  I acknowledge that my medical condition has been evaluated and explained to me by the emergency department physician or other qualified medical person and/or my attending physician, who has recommended that I be transferred to the service of  Accepting Physician: Diana Skelton at [address], [address] : [facility]  The above potential benefits of such transfer, the potential risks associated with such transfer, and the probable risks of not being transferred have been explained to me, and I fully understand them  The doctor has explained that, in my case, the benefits of transfer outweigh the risks  I agree to be transferred  I authorize the performance of emergency medical procedures and treatments upon me in both transit and upon arrival at the receiving facility  Additionally, I authorize the release of any and all medical records to the receiving facility and request they be transported with me, if possible  I understand that the safest mode of transportation during a medical emergency is an ambulance and that the Hospital advocates the use of this mode of transport   Risks of traveling to the receiving facility by car, including absence of medical control, life sustaining equipment, such as oxygen, and medical personnel has been explained to me and I fully understand them  (DHEERAJ CORRECT BOX BELOW)  [  ]  I consent to the stated transfer and to be transported by ambulance/helicopter  [  ]  I consent to the stated transfer, but refuse transportation by ambulance and accept full responsibility for my transportation by car  I understand the risks of non-ambulance transfers and I exonerate the Hospital and its staff from any deterioration in my condition that results from this refusal     X___________________________________________    DATE  10/25/19  TIME________  Signature of patient or legally responsible individual signing on patient behalf           RELATIONSHIP TO PATIENT_________________________          Provider Certification    NAME [address] [place] [address]                                         1934                              MRN [id number]    A medical screening exam was performed on the above named patient  Based on the examination:    Condition Necessitating Transfer The primary encounter diagnosis was Fall, initial encounter  A diagnosis of Orbital fracture was also pertinent to this visit      Patient Condition: The patient has been stabilized such that within reasonable medical probability, no material deterioration of the patient condition or the condition of the unborn child(bhavya) is likely to result from the transfer    Reason for Transfer: Level of Care needed not available at this facility(Trauma/opthalmology)    Transfer Requirements: Facility [facility]   · Space available and qualified personnel available for treatment as acknowledged by PACS  · Agreed to accept transfer and to provide appropriate medical treatment as acknowledged by       Uri Carpio  · Appropriate medical records of the examination and treatment of the patient are provided at the time of transfer   [address] _______  · Transfer will be performed by qualified personnel from    and appropriate transfer equipment as required, including the use of necessary and appropriate life support measures  Provider Certification: I have examined the patient and explained the following risks and benefits of being transferred/refusing transfer to the patient/family:  General risk, such as traffic hazards, adverse weather conditions, rough terrain or turbulence, possible failure of equipment (including vehicle or aircraft), or consequences of actions of persons outside the control of the transport personnel      Based on these reasonable risks and benefits to the patient and/or the unborn child(bhavya), and based upon the information available at the time of the patients examination, I certify that the medical benefits reasonably to be expected from the provision of appropriate medical treatments at another medical facility outweigh the increasing risks, if any, to the individuals medical condition, and in the case of labor to the unborn child, from effecting the transfer      X____________________________________________ DATE 10/25/19        TIME_______      ORIGINAL - SEND TO MEDICAL RECORDS   COPY - SEND WITH PATIENT DURING TRANSFER

## 2019-10-26 PROBLEM — S02.85XA ORBITAL FRACTURE (HCC): Status: ACTIVE | Noted: 2019-10-26

## 2019-10-26 PROBLEM — S01.81XA FACIAL LACERATION: Status: ACTIVE | Noted: 2019-10-26

## 2019-10-26 PROBLEM — S02.401A MAXILLARY FRACTURE (HCC): Status: ACTIVE | Noted: 2019-10-26
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
## 2019-10-26 PROBLEM — S02.402A ZYGOMATIC FRACTURE (HCC): Status: ACTIVE | Noted: 2019-10-26

## 2019-10-26 LAB
ERYTHROCYTE [DISTWIDTH] IN BLOOD BY AUTOMATED COUNT: 13 % (ref 11.6–15.1)
HCT VFR BLD AUTO: 33.2 % (ref 34.8–46.1)
HCT VFR BLD AUTO: 34.4 % (ref 34.8–46.1)
HGB BLD-MCNC: 10.5 G/DL (ref 11.5–15.4)
HGB BLD-MCNC: 11.1 G/DL (ref 11.5–15.4)
MCH RBC QN AUTO: 32.6 PG (ref 26.8–34.3)
MCHC RBC AUTO-ENTMCNC: 31.6 G/DL (ref 31.4–37.4)
MCV RBC AUTO: 103 FL (ref 82–98)
PLATELET # BLD AUTO: 168 THOUSANDS/UL (ref 149–390)
PMV BLD AUTO: 10.1 FL (ref 8.9–12.7)
RBC # BLD AUTO: 3.22 MILLION/UL (ref 3.81–5.12)
WBC # BLD AUTO: 6.28 THOUSAND/UL (ref 4.31–10.16)

## 2019-10-26 PROCEDURE — 97167 OT EVAL HIGH COMPLEX 60 MIN: CPT

## 2019-10-26 PROCEDURE — 85014 HEMATOCRIT: CPT | Performed by: PHYSICIAN ASSISTANT

## 2019-10-26 PROCEDURE — G8979 MOBILITY GOAL STATUS: HCPCS

## 2019-10-26 PROCEDURE — 85027 COMPLETE CBC AUTOMATED: CPT | Performed by: EMERGENCY MEDICINE

## 2019-10-26 PROCEDURE — 85018 HEMOGLOBIN: CPT | Performed by: PHYSICIAN ASSISTANT

## 2019-10-26 PROCEDURE — 99232 SBSQ HOSP IP/OBS MODERATE 35: CPT | Performed by: SURGERY

## 2019-10-26 PROCEDURE — G8988 SELF CARE GOAL STATUS: HCPCS

## 2019-10-26 PROCEDURE — G8987 SELF CARE CURRENT STATUS: HCPCS

## 2019-10-26 PROCEDURE — 97163 PT EVAL HIGH COMPLEX 45 MIN: CPT

## 2019-10-26 PROCEDURE — G8978 MOBILITY CURRENT STATUS: HCPCS

## 2019-10-26 RX ORDER — ENTACAPONE 200 MG/1
200 TABLET ORAL DAILY
Status: DISCONTINUED | OUTPATIENT
Start: 2019-10-26 | End: 2019-10-27 | Stop reason: HOSPADM

## 2019-10-26 RX ORDER — ALENDRONATE SODIUM 70 MG/1
70 TABLET ORAL WEEKLY
Refills: 0 | Status: CANCELLED | OUTPATIENT
Start: 2019-10-26

## 2019-10-26 RX ORDER — AMOXICILLIN AND CLAVULANATE POTASSIUM 250; 125 MG/1; MG/1
1 TABLET, FILM COATED ORAL EVERY 12 HOURS SCHEDULED
Qty: 14 TABLET | Refills: 0 | Status: SHIPPED | OUTPATIENT
Start: 2019-10-26 | End: 2019-11-02

## 2019-10-26 RX ADMIN — ACETAMINOPHEN 975 MG: 325 TABLET ORAL at 05:27

## 2019-10-26 RX ADMIN — ACETAMINOPHEN 975 MG: 325 TABLET ORAL at 22:02

## 2019-10-26 RX ADMIN — DOCUSATE SODIUM 100 MG: 100 CAPSULE, LIQUID FILLED ORAL at 09:46

## 2019-10-26 RX ADMIN — ENTACAPONE 200 MG: 200 TABLET ORAL at 09:46

## 2019-10-26 RX ADMIN — CARBIDOPA AND LEVODOPA 1.5 TABLET: 25; 100 TABLET ORAL at 09:46

## 2019-10-26 RX ADMIN — OXYBUTYNIN CHLORIDE 10 MG: 10 TABLET, EXTENDED RELEASE ORAL at 09:45

## 2019-10-26 RX ADMIN — SENNOSIDES AND DOCUSATE SODIUM 1 TABLET: 8.6; 5 TABLET ORAL at 17:50

## 2019-10-26 RX ADMIN — LEVOTHYROXINE SODIUM 75 MCG: 75 TABLET ORAL at 05:27

## 2019-10-26 RX ADMIN — SENNOSIDES AND DOCUSATE SODIUM 1 TABLET: 8.6; 5 TABLET ORAL at 09:46

## 2019-10-26 RX ADMIN — ACETAMINOPHEN 975 MG: 325 TABLET ORAL at 13:24

## 2019-10-26 RX ADMIN — LOSARTAN POTASSIUM 100 MG: 50 TABLET, FILM COATED ORAL at 09:45

## 2019-10-26 RX ADMIN — DOCUSATE SODIUM 100 MG: 100 CAPSULE, LIQUID FILLED ORAL at 17:50

## 2019-10-26 RX ADMIN — SODIUM CHLORIDE, SODIUM GLUCONATE, SODIUM ACETATE, POTASSIUM CHLORIDE AND MAGNESIUM CHLORIDE 50 ML/HR: 526; 502; 368; 37; 30 INJECTION, SOLUTION INTRAVENOUS at 17:49

## 2019-10-26 NOTE — PROGRESS NOTES
Progress Note - Ishan Jeffrey 1934, 80 y o  female MRN: [id number]    Unit/Bed#: [facility] 604-01 Encounter: [id number]    Primary Care Provider: Mellisa Pearl MD   Date and time admitted to hospital: 10/25/2019  4:26 PM        Facial laceration  Assessment & Plan  - sutured by ED at [facility]  - suture removal in [address]  - PT/OT  - Hgb dropped 3 grams, likely due to laceration  Will recheck at noon  - discharge to home if H/H stable and cleared by Optho    Zygomatic fracture [facility])  Assessment & Plan  - OMFS non op    Orbital fracture  Assessment & Plan  - OMFS non op  - Ophthalmology consult  - Augmentin x 7 days      * Maxillary fracture [facility])    Noah  non op  - Augmentin x 7 days            Bedside nurse rounds completed with nurse Harpreet Marlow  Prophylaxis: Reason for no pharmacologic prophylaxis laceration with anemia    Disposition: home    Code status:  Level 1 - Full Code    Consultants: Ophthalmology, OMFS    Is the patient 72 years or older?: YES:    1  Before the illness or injury that brought you to the Emergency, did you need someone to help you on a regular basis? 1=Yes   2  Since the illness or injury that brought you to the Emergency, have you needed more help than usual to take care of yourself? 0=No   3  Have you been hospitalized for one or more nights during the past 6 months (excluding a stay in the Emergency Department)? 0=No   4  In general, do you see well? 0=Yes   5  In general, do you have serious problems with your memory? 0=No   6  Do you take more than three different medications everyday?  1=Yes   TOTAL   2     Did you order a geriatric consult if the score was 2 or greater?: no    SUBJECTIVE:     Transfer from: [facility]  Outside Films Received: not applicable  Tertiary Exam Due on: today    Mechanism of Injury:Fall    Details related to Injury: +LOC:  no    Chief Complaint: None    HPI/Last 24 hour events: Doing well with no complaints    Active medications:           Current Facility-Administered Medications:     acetaminophen (TYLENOL) tablet 975 mg, 975 mg, Oral, Q8H [facility] HOME, 975 mg at 10/26/19 0527    carbidopa-levodopa (SINEMET)  mg per tablet 1 5 tablet, 1 5 tablet, Oral, Daily, 1 5 tablet at 10/26/19 0946 **AND** entacapone (COMTAN) tablet 200 mg, 200 mg, Oral, Daily, 200 mg at 10/26/19 0946    docusate sodium (COLACE) capsule 100 mg, 100 mg, Oral, BID, 100 mg at 10/26/19 0946    HYDROmorphone (DILAUDID) injection 0 2 mg, 0 2 mg, Intravenous, Q4H PRN    levothyroxine tablet 75 mcg, 75 mcg, Oral, Daily, 75 mcg at 10/26/19 0527    losartan (COZAAR) tablet 100 mg, 100 mg, Oral, Daily, 100 mg at 10/26/19 0945    multi-electrolyte (PLASMALYTE-A/ISOLYTE-S PH 7 4) IV solution, 50 mL/hr, Intravenous, Continuous, 50 mL/hr at 10/25/19 2153    naloxone (NARCAN) 0 04 mg/mL syringe 0 04 mg, 0 04 mg, Intravenous, Q1MIN PRN    ondansetron (ZOFRAN) injection 4 mg, 4 mg, Intravenous, Q4H PRN    oxybutynin (DITROPAN-XL) 24 hr tablet 10 mg, 10 mg, Oral, Daily, 10 mg at 10/26/19 0945    oxyCODONE (ROXICODONE) IR tablet 2 5 mg, 2 5 mg, Oral, Q4H PRN    oxyCODONE (ROXICODONE) IR tablet 5 mg, 5 mg, Oral, Q4H PRN    polyethylene glycol (MIRALAX) packet 17 g, 17 g, Oral, Daily PRN    rotigotine (NEUPRO) 1 MG/24HR transdermal patch 1 patch, 1 patch, Transdermal, Daily    senna-docusate sodium (SENOKOT S) 8 6-50 mg per tablet 1 tablet, 1 tablet, Oral, BID, 1 tablet at 10/26/19 0946    tetanus-diphtheria-acellular pertussis (BOOSTRIX) IM injection 0 5 mL, 0 5 mL, Intramuscular, Once      OBJECTIVE:     Vitals:   Vitals:    10/26/19 0751   BP: 138/71   Pulse: 76   Resp: 16   Temp: 97 9 °F (36 6 °C)   SpO2: 97%       Physical Exam:   Constitution: patient lying in bed, appears comfortable  HEENT: SCOTT, EOMI  CV: regular rate and rhythm, +2 DP pulses  Pulm: CTA, no wheezes, rhonchi or crackles, unlabored, equal bilaterally  Abd: soft, nontender, nondistended, active bowel sounds  Extremities: moves all extremities, equal strength, no edema, no skin breakdown  Neuro: A&O, no focal deficits  Skin: periorbital ecchymosis, left eyebrow lac c/d/i            I/O:   I/O       10/24 0701 - 10/25 0700 10/25 0701 - 10/26 0700 10/26 0701 - 10/27 0700    P  O   240     I V   416 7     Total Intake  656 7     Urine  230     Total Output  230     Net  +426 7                  Invasive Devices: Invasive Devices     Peripheral Intravenous Line            Peripheral IV 10/25/19 Right Antecubital less than 1 day                  Imaging:   Xr Humerus Left    Result Date: 10/25/2019  Impression: Small ossific fragment noted adjacent to the lateral aspect of the glenoid is age-indeterminate and should be correlated with focal point tenderness in this region  High riding left humeral head which can be seen in setting of chronic rotator cuff tear  Workstation performed: [id number]     Xr Elbow 2 Vw Left    Result Date: 10/25/2019  Impression: No acute osseous abnormality  Workstation performed: [id number]     Xr Hand 3+ Views Left    Result Date: 10/25/2019  Impression: No acute fracture or dislocation identified  Osteopenic bones  Workstation performed: [id number]     Ct Head Without Contrast    Result Date: 10/25/2019  Impression: No acute intracranial hemorrhage seen Bilateral maxillary hemosinus with the left pneumo orbit, correlate with CT of the facial bone report Workstation performed: [id number]     Ct Facial Bones Without Contrast    Result Date: 10/25/2019  Impression: Multiple fractures of the face Fracture of the anterior wall of the left maxillary sinus with depression of about 6 mm left maxillary hemosinus   Fracture of the lateral wall of the left orbit at the level of  zygomaticofrontal region and zygomaticosphenoid region without significant significance displaced Fracture of the left orbital floor with depression of anterior to middle one 3rd of the orbital floor by about 4 mm with fracture through the left orbital floor There is fracture of the lateral left maxillary wall, Which involves the anterolateral margin of the nasal fossa above the maxillary alveolar ridge  The nasal spine of the right maxilla is intact  This Fracture also involves the nasolacrimal duct and there is associated fracture of the left pterygoid plates suggesting [place] 1injury Left pneumo orbit  Fracture through the inferior aspect of the left maxilla which involves the lateral nasal wall, medial left maxillary sinus wall, lateral left maxillary sinus sinus wall Opacification of the right maxillary sinus which demonstrates hyperdensity within it couple with the thickened wall, may be due to chronic sinus disease with the noninvasive fungal /allergic sinus disease, right maxillary hemosinus is another less likely  possibility however there is no displaced fracture seen The study was marked in EPIC for immediate notification   Workstation performed: [id number]     Ct Cervical Spine Without Contrast    Result Date: 10/25/2019  Impression: No acute compression collapse of the vertebra seen Multiple facial bone fractures, correlate with CT of the facial bones Left sided pneumo orbit  Workstation performed: [id number]       Labs:   CBC:   Lab Results   Component Value Date    WBC 6 28 10/26/2019    HGB 10 5 (L) 10/26/2019    HCT 33 2 (L) 10/26/2019     (H) 10/26/2019     10/26/2019    MCH 32 6 10/26/2019    MCHC 31 6 10/26/2019    RDW 13 0 10/26/2019    MPV 10 1 10/26/2019    NRBC 0 10/25/2019     CMP:   Lab Results   Component Value Date     (H) 10/25/2019    CO2 28 10/25/2019    BUN 31 (H) 10/25/2019    CREATININE 1 05 10/25/2019    CALCIUM 8 7 10/25/2019    EGFR 49 10/25/2019

## 2019-10-26 NOTE — OCCUPATIONAL THERAPY NOTE
[address] Evaluation     Patient Name: Janey Hill  Today's Date: 10/26/2019  Problem List  Principal Problem:    Maxillary fracture [facility])  Active Problems:    Orbital fracture    Zygomatic fracture [facility])    Fall    Facial laceration    Past Medical History  Past Medical History:   Diagnosis Date    Constipation     Hypertension     Osteoporosis     Parkinson disease ([address] )      Past Surgical History  Past Surgical History:   Procedure Laterality Date    BASAL CELL CARCINOMA EXCISION      above lip    BREAST CYST EXCISION      COLONOSCOPY      HYSTERECTOMY      ROTATOR CUFF REPAIR Right          10/26/19 0918   Note Type   Note type Eval only   Restrictions/Precautions   Weight Bearing Precautions Per Order No   Other Precautions Cognitive; Chair Alarm; Bed Alarm; Fall Risk;Pain;Telemetry;Multiple lines  (+Azeri speaking)   Pain Assessment   Pain Assessment No/denies pain   Pain Score No Pain   Home Living   Type of Home House  (4 SLOANE)   Home Layout Two level  (full flight to 2nd floor bed/bathroom)   Bathroom Shower/Tub Tub/shower unit   Bathroom Toilet Standard   Bathroom Equipment Grab bars in shower; Shower chair   [facility]; Wheelchair-manual;Grab bars   Prior Function   Level of [place] Needs assistance with IADLs; Needs assistance with ADLs and functional mobility   Lives With Daughter  ( two grandsons)   Receives Help From Family   ADL Assistance Needs assistance   IADLs Needs assistance   Falls in the last 6 months 1 to 4   Vocational Retired   [address] with ADl's/IADL's, no AD with functional ambulation within house, w/c community distances, +retired, does not drive  (Daughter reports -pt was able to perform self feeding post set-up, bath, dress  with Supervision) Assistance to climb stairs to bed/bathroom   Reciprocal Relationships daughter/grandsons   Service to Others retired   Intrinsic Gratification sedantry/TV   Psychosocial   Psychosocial (WDL) WDL   Subjective   Subjective pt received in supine with daughter feeding pt breakfast-daughter interpretted functional instructions -pt is primarly Bengali speaking  ADL   Where Assessed Supine, bed   Eating Assistance 1  Total Assistance   Grooming Assistance 2  Maximal Assistance   UB Bathing Assistance 3  Moderate Assistance   LB Bathing Assistance 2  Maximal Assistance   UB Dressing Assistance 2  Maximal Assistance   LB Dressing Assistance 1  Total Assistance   Toileting Assistance  4  Minimal Assistance   Toileting Deficit Steadying;Perineal hygiene   Bed Mobility   Supine to Sit 2  Maximal assistance   Additional items Assist x 2  (2* to anticipation/fear of pain)   Transfers   Sit to Stand 3  Moderate assistance   Additional items Assist x 1; Increased time required;Verbal cues   Stand to Sit 3  Moderate assistance   Additional items Assist x 1; Increased time required;Verbal cues   Toilet transfer 4  Minimal assistance   Additional items Assist x 1; Increased time required;Verbal cues  (verbal cues )   Additional Comments verbal cues/[place] cues for safe hand placement and safety to center self to surfaces  Functional Mobility   Functional Mobility 3  Moderate assistance   Additional Comments moderate assist x1 initially progressing to min a with HHA    Balance   Static Sitting Fair -   Dynamic Sitting Poor +   Static Standing Poor +   Dynamic Standing Poor   Ambulatory Poor   Activity Tolerance   Activity Tolerance Patient limited by fatigue   Medical Staff Made Aware PT   Nurse Made Aware RN cleared pt for therapy   RUE Assessment   RUE Assessment WFL   LUE Assessment   LUE Assessment WFL   Hand Function   Gross Motor Coordination Functional   Fine Motor Coordination Functional   Cognition   Overall Cognitive Status   (baseline dementia)   Arousal/Participation Alert; Responsive   Attention Attends with cues to redirect   Orientation Level Oriented to person Memory Decreased recall of precautions;Decreased recall of recent events;Decreased short term memory   Following Commands Follows one step commands with increased time or repetition   Comments pt's daughter reports patients cognition is at baseline- Upper sorbian speaking -daughter interprets, pt unable to state date, oriented to person only  (delayed responses, repitition with simple functional command)   Assessment   Limitation Decreased ADL status; Decreased Safe judgement during ADL;Decreased self-care trans;Decreased high-level ADLs; Decreased cognition;Decreased endurance   Prognosis Fair   Assessment Pt is a 80 y o  female who was admitted to [address] [place] [address] and  [facility] on 10/25/2019 with fall, facial laceration +sutures,  Maxillary fracture (HCC) , orbital fracture  Pt's problem list also includes PMH of parkinson's, dementia, hypothyroidism, hyperlipidemia, HTN, constipation,osteoporosis, fraility   At baseline pt was completing ADl's/IADL's with assistance-per daughter report -pt was able to bath, dress, and feed herself, no AD with functional ambulation in house, w/c for community distances and 24/7 supervision and assistance from daughter  Pt lives with daughter and grandsons (2) in a 2   with 4 SLOANE   Currently pt requires max afor overall ADLS and mod a with HHA for functional mobility/transfers  Pt currently presents with impairments in the following categories -difficulty performing ADLS, difficulty performing IADLS , limited insight into deficits and flat affect activity tolerance, endurance, standing balance/tolerance, memory, insight, safety , judgement , attention , sequencing , task initiation , communication and interpersonal skills   These impairments, as well as pt's fatigue, pain and risk for falls  limit pt's ability to safely engage in all baseline areas of occupation, includingeating, grooming, bathing, dressing, toileting, functional mobility/transfers, community mobility and leisure activities  From OT standpoint, recommend STR-however daughter not interested and daughter's goal is for pt to discharge home with 24/7 family support upon D/C  OT will continue to follow to address the below stated goals  Goals   Patient Goals per daughter take her mother home and "no OT referrals, I dont want her to have OT"   LTG Time Frame 10-14   Plan   Treatment Interventions ADL retraining;Functional transfer training;UE strengthening/ROM; Endurance training;Patient/family training;Equipment evaluation/education; Compensatory technique education; Energy conservation; Activityengagement   Goal Expiration Date 11/09/19   OT Frequency 3-5x/wk   Recommendation   OT Discharge Recommendation Other (Comment)  (pt's daughter reports she does not want pt to go to rehab or any OT services beyond hospital)   OT - OK to Discharge Yes   Barthel Index   Feeding 5   Bathing 0   Grooming Score 0   Dressing Score 0   Bladder Score 10   Bowels Score 10   Toilet Use Score 5   Transfers (Bed/Chair) Score 5   Mobility (Level Surface) Score 0   Stairs Score 0   Barthel Index Score 35   Modified [place] Scale   Modified Swisher Scale 4      Occupational Therapy Goals:    *S with bed mobility to engage in functional tasks    *S Adl's after setup with use of AE PRN  *S toileting and clothing management   *S  functional mobility and transfers to/from all surfaces with Fair + dynamic balance and safety for participation in dynamic adls and iadl tasks   *Increase activity tolerance to 25-30 minutes for participation in adls and enjoyable activities  *Pt to participate in further cognitive testing with good attention and participation to assist with safe d/c recommendations  Jong CASON, OTR/L

## 2019-10-26 NOTE — ASSESSMENT & PLAN NOTE
- PT/OT  - Hgb dropped 3 grams, likely due to laceration    Will recheck at noon  - discharge to home if H/H stable and cleared by [facility]

## 2019-10-26 NOTE — PLAN OF CARE
Problem: OCCUPATIONAL THERAPY ADULT  Goal: Performs self-care activities at highest level of function for planned discharge setting  See evaluation for individualized goals  Description  Treatment Interventions: ADL retraining, Functional transfer training, UE strengthening/ROM, Endurance training, Patient/family training, Equipment evaluation/education, Compensatory technique education, Energy conservation, Activityengagement          See flowsheet documentation for full assessment, interventions and recommendations  Note:   Limitation: Decreased ADL status, Decreased Safe judgement during ADL, Decreased self-care trans, Decreased high-level ADLs, Decreased cognition, Decreased endurance  Prognosis: Fair  Assessment: Pt is a 80 y o  female who was admitted to [address] [place] [address] on 10/25/2019 with fall, facial laceration +sutures,  Maxillary fracture ([address] ) , orbital fracture  Pt's problem list also includes PMH of parkinson's, dementia, hypothyroidism, hyperlipidemia, HTN, constipation,osteoporosis, fraility   At baseline pt was completing ADl's/IADL's with assistance-per daughter report -pt was able to bath, dress, and feed herself, no AD with functional ambulation in house, w/c for community distances and 24/7 supervision and assistance from daughter  Pt lives with daughter and grandsons (2) in a 2 SH  with 4 SLOANE   Currently pt requires max afor overall ADLS and mod a with HHA for functional mobility/transfers  Pt currently presents with impairments in the following categories -difficulty performing ADLS, difficulty performing IADLS , limited insight into deficits and flat affect activity tolerance, endurance, standing balance/tolerance, memory, insight, safety , judgement , attention , sequencing , task initiation , communication and interpersonal skills   These impairments, as well as pt's fatigue, pain and risk for falls  limit pt's ability to safely engage in all baseline areas of occupation, includingeating, grooming, bathing, dressing, toileting, functional mobility/transfers, community mobility and leisure activities  From OT standpoint, recommend STR-however daughter not interested and daughter's goal is for pt to discharge home with 24/7 family support upon D/C  OT will continue to follow to address the below stated goals  OT Discharge Recommendation: Other (Comment)(pt's daughter reports she does not want pt to go to rehab) or any OT services beyond hospital)  OT - OK to Discharge:  Yes

## 2019-10-26 NOTE — PLAN OF CARE
Problem: Potential for Falls  Goal: Patient will remain free of falls  Description  INTERVENTIONS:  - Assess patient frequently for physical needs  -  Identify cognitive and physical deficits and behaviors that affect risk of falls    -  [place] fall precautions as indicated by assessment   - Educate patient/family on patient safety including physical limitations  - Instruct patient to call for assistance with activity based on assessment  - Modify environment to reduce risk of injury  - Consider OT/PT consult to assist with strengthening/mobility  Outcome: Progressing     Problem: PAIN - ADULT  Goal: Verbalizes/displays adequate comfort level or baseline comfort level  Description  Interventions:  - Encourage patient to monitor pain and request assistance  - Assess pain using appropriate pain scale  - Administer analgesics based on type and severity of pain and evaluate response  - Implement non-pharmacological measures as appropriate and evaluate response  - Consider cultural and social influences on pain and pain management  - Notify physician/advanced practitioner if interventions unsuccessful or patient reports new pain  Outcome: Progressing     Problem: INFECTION - ADULT  Goal: Absence or prevention of progression during hospitalization  Description  INTERVENTIONS:  - Assess and monitor for signs and symptoms of infection  - Monitor lab/diagnostic results  - Monitor all insertion sites, i e  indwelling lines, tubes, and drains  - Monitor endotracheal if appropriate and nasal secretions for changes in amount and color  - [place] appropriate cooling/warming therapies per order  - Administer medications as ordered  - Instruct and encourage patient and family to use good hand hygiene technique  - Identify and instruct in appropriate isolation precautions for identified infection/condition  Outcome: Progressing  Goal: Absence of fever/infection during neutropenic period  Description  INTERVENTIONS:  - Monitor WBC    Outcome: Progressing     Problem: SAFETY ADULT  Goal: Maintain or return to baseline ADL function  Description  INTERVENTIONS:  -  Assess patient's ability to carry out ADLs; assess patient's baseline for ADL function and identify physical deficits which impact ability to perform ADLs (bathing, care of mouth/teeth, toileting, grooming, dressing, etc )  - Assess/evaluate cause of self-care deficits   - Assess range of motion  - Assess patient's mobility; develop plan if impaired  - Assess patient's need for assistive devices and provide as appropriate  - Encourage maximum independence but intervene and supervise when necessary  - Involve family in performance of ADLs  - Assess for home care needs following discharge   - Consider OT consult to assist with ADL evaluation and planning for discharge  - Provide patient education as appropriate  Outcome: Progressing  Goal: Maintain or return mobility status to optimal level  Description  INTERVENTIONS:  - Assess patient's baseline mobility status (ambulation, transfers, stairs, etc )    - Identify cognitive and physical deficits and behaviors that affect mobility  - Identify mobility aids required to assist with transfers and/or ambulation (gait belt, sit-to-stand, lift, walker, cane, etc )  - [place] fall precautions as indicated by assessment  - Record patient progress and toleration of activity level on Mobility SBAR; progress patient to next Phase/Stage  - Instruct patient to call for assistance with activity based on assessment  - Consider rehabilitation consult to assist with strengthening/weightbearing, etc   Outcome: Progressing     Problem: DISCHARGE PLANNING  Goal: Discharge to home or other facility with appropriate resources  Description  INTERVENTIONS:  - Identify barriers to discharge w/patient and caregiver  - Arrange for needed discharge resources and transportation as appropriate  - Identify discharge learning needs (meds, wound care, etc )  - Arrange for interpretive services to assist at discharge as needed  - Refer to Case Management Department for coordinating discharge planning if the patient needs post-hospital services based on physician/advanced practitioner order or complex needs related to functional status, cognitive ability, or social support system  Outcome: Progressing     Problem: Knowledge Deficit  Goal: Patient/family/caregiver demonstrates understanding of disease process, treatment plan, medications, and discharge instructions  Description  Complete learning assessment and assess knowledge base    Interventions:  - Provide teaching at level of understanding  - Provide teaching via preferred learning methods  Outcome: Progressing

## 2019-10-26 NOTE — DISCHARGE SUMMARY
Discharge Summary - Dasha Agrawal 80 y o  female MRN: [id number]    Unit/Bed#: [facility] 604-01 Encounter: [id number]    Admission Date:   Admission Orders (From admission, onward)     Ordered        10/25/19 1813  Inpatient Admission  Once                     Admitting Diagnosis: Orbital fracture [S02 85XA]  Extensive facial fractures ([address] ) [S02 92XA]    HPI:  Patient is a 80-year-old female who presented as a transfer from [facility] after falling at home  GCS was 15 on arrival to our facility, patient was grossly motor and sensory intact  Patient was with significant swelling ecchymosis around her left eye, and there was a laceration which was artery repaired  CT imaging of her head was without any intracranial injuries, although CT face was with multiple facial fractures  Xrays of left humerus, left elbow, and left hand were without any acute traumatic injuries  Procedures Performed: No orders of the defined types were placed in this encounter  Summary of Hospital Course:  Patient was admitted to the trauma service on 10/25/2019  Patient was tolerating a regular diet  Patient's vital signs were continually stable, and her pain was well controlled  Hemoglobin was stable  Oral and maxillofacial surgery saw and evaluated the patient, and had decided that no surgery intervention by OMFS would be required  Patient was to continue Augmentin for 7 days, and keep the head of her bed at 30°, and refrain from blowing her nose, bearing down forcefully sneezing with her mouth open  Opthomology saw and evaluated patient and upon there assessment, there was no apparent retinal or optic nerve involvement and the pt was to follow up with opthomologist outpatient for complete exam  Pt worked w physical therapy and occupational therapy and recs were for outpatient pt, however daughter did not want pt to go to rehab after leaving hosptial  Pt was deemed medically stable for discharge on 10/27/2019  Significant Findings, Care, Treatment and Services Provided: none    Complications:  None    Discharge Diagnosis:  Same    Resolved Problems  Date Reviewed: 10/26/2019    None          Condition at Discharge: good         Discharge instructions/Information to patient and family:   See after visit summary for information provided to patient and family  Provisions for Follow-Up Care:  See after visit summary for information related to follow-up care and any pertinent home health orders  PCP: Paul Quiñones MD    Disposition: See After Visit Summary for discharge disposition information  Planned Readmission: No      Discharge Statement   I spent 30 minutes discharging the patient  This time was spent on the day of discharge  I had direct contact with the patient on the day of discharge  Additional documentation is required if more than 30 minutes were spent on discharge  Discharge Medications:  See after visit summary for reconciled discharge medications provided to patient and family

## 2019-10-26 NOTE — SOCIAL WORK
CM met with Pt and daughter Fabricio Sue with an introduction and explanation of role  Fabricio Sue reported the Pt resides with her, her spouse and two adult sons in a two story home with access to a roller walker, transport chair and 4 steps to enter the home  Pt was reported being independent with bathing, dressing and feeding herself with no hx of VNA, SNF, mental health or drug/alcohol placements  Pt reported having Allyssa Palmer as a PCP  Family to transport upon d/c  Fabricio Sue reported not being interested in recommendation of [place] [address] or SNF services as the Pt is very active and mobile and has 24/7 care and supervision within the home environment  CM reviewed d/c planning process including the following: identifying help at home, patient preference for d/c planning needs, Discharge Lounge, [facility] Meds to Bed program, availability of treatment team to discuss questions or concerns patient and/or family may have regarding understanding medications and recognizing signs and symptoms once discharged  CM also encouraged patient to follow up with all recommended appointments after discharge  Patient advised of importance for patient and family to participate in managing patients medical well being

## 2019-10-26 NOTE — PHYSICAL THERAPY NOTE
Physical Therapy Evaluation     Patient's Name: Dave Gandara    Admitting Diagnosis  Orbital fracture [S02 85XA]  Extensive facial fractures ([facility] [address] ) [S02 92XA]    Problem List  Patient Active Problem List   Diagnosis    Slow transit constipation    Orbital fracture    Maxillary fracture ([facility] [address] )    Zygomatic fracture ([facility] [address] )    Fall    Facial laceration       Past Medical History  Past Medical History:   Diagnosis Date    Constipation     Hypertension     Osteoporosis     Parkinson disease ([facility] [address] )        Past Surgical History  Past Surgical History:   Procedure Laterality Date    BASAL CELL CARCINOMA EXCISION      above lip    BREAST CYST EXCISION      COLONOSCOPY      HYSTERECTOMY      ROTATOR CUFF REPAIR Right         10/26/19 0920   Note Type   Note type Eval/Treat   Pain Assessment   Pain Assessment No/denies pain   Pain Score No Pain   Home Living   Type of [address] [place] [address] Two level  (4 SLOANE)   Bathroom Accessibility [place]; Wheelchair-manual  (transport chair)   Prior Function   Level of [place] Needs assistance with IADLs   Lives With Daughter   Receives Help From Family   ADL Assistance Needs assistance   IADLs Needs assistance   Falls in the last 6 months 1 to 4   Vocational Retired   Restrictions/Precautions   Wells Concepcion Bearing Precautions Per Order No   Other Precautions Cognitive; Chair Alarm; Bed Alarm;Multiple lines; Fall Risk   General   Family/Caregiver Present Yes  (Daughter)   Cognition   Orientation Level Oriented to person   RLE Assessment   RLE Assessment   (grossly at least 3/5 with movement)   LLE Assessment   LLE Assessment   (grossly at least 3/5 with movement)   Coordination   Movements are Fluid and Coordinated 0   Coordination and Movement Description slow, guarded, ataxic  known hx of PD   Bed Mobility   Supine to Sit 2  Maximal assistance   Additional items Assist x 1; Increased time required  (additional person for safety  )   Sit to Supine Unable to assess  (pt left resting in chair, dtr bedside, alarm active)   Transfers   Sit to Stand 3  Moderate assistance   Additional items Assist x 1; Increased time required  (additional person for safety)   Stand to Sit 3  Moderate assistance   Additional items Assist x 1; Increased time required  (additional person for safety)   Toilet transfer   (with OT A and HR)   Ambulation/Elevation   Gait pattern Excessively slow; Ataxia; Shuffling;Scissoring;Decreased foot clearance   Gait Assistance 3  Moderate assist   Additional items Assist x 1   Assistive Device Other (Comment)  (HHA)   Distance 10+8   Balance   Static Sitting Fair -   Ambulatory Poor   Endurance Deficit   Endurance Deficit Yes   Endurance Deficit Description limited by fatigue, fear of falling   Activity Tolerance   Activity Tolerance Patient limited by fatigue   Medical Staff Made Aware OT, trauma, CM for D/C planning   Nurse Made Aware yes, nsg gave clearance to work with pt   Assessment   Prognosis Fair   Problem List Decreased endurance; Impaired balance;Decreased mobility; Decreased safety awareness;Decreased coordination   Assessment Pt is 80 y o  female seen for PT evaluation s/p admit to [facility] on 10/25/2019 w/ Maxillary fracture ([address] )  PT consulted to assess pt's functional mobility and d/c needs  Order placed for PT eval and tx, w/ up in chair order  Comorbidities affecting pt's physical performance at time of assessment include: PD with known balance and ambulation dysfunction  PTA, pt was requiring A for mobility, lives in multi-level home and has 4 SLOANE  Per daughter she is 1* caregiver and A mother with mobility and ADLs prn  2 adult sons also live in the home and are able to provide S as needed  Pt does not use AD in the home  Daughter reports in the community will ambulate or use transport chair pending distance and how the patient is feeling  Daughter reports frequent scissoring at home during ambulation   Pt  has a past medical history of Constipation, Hypertension, Osteoporosis, and Parkinson disease ([address] )  Personal factors affecting pt at time of IE include: lives in 2 story house, stairs to enter home, preferred language not [place] (language barrier), positive fall history, inability to perform IADLs, inability to perform ADLs and increased fear of falling  Please find objective findings from PT assessment regarding body systems outlined above with impairments and limitations including impaired balance, decreased endurance, impaired coordination, gait deviations, decreased activity tolerance, decreased functional mobility tolerance and fall risk  Pt required increased A for bed mobility with increased fear of falling  Single step instruction and tactile instruction to complete bed mobility  Tolerated sitting EOB without c/o dizziness  Reports able to see from L eye despite swelling  Pt required HHA for transfers with deficits in balance  Noted scissoring immediately upon standing which improved with verbal instruction  Slow gait with decreased foot clearance  Daughter reports near baseline ambulation from visual  Daughter reports she was not happy with inpt rehab in the past  Recommend OP big and loud program for PD if daughter continues to wish to take pt home and provide constant A  Pt is at high risk for additional falls without A, daughter is aware and reports she has been helping her mother for several years and feels comfortable to A at current level as she previously worked in healthcare  The following objective measures performed on IE also reveal limitations: Barthel Index: 40/100  Pt's clinical presentation is currently unstable/unpredictable seen    Goals   Patient Goals Daughter goal is to take mother home  STG Expiration Date 11/07/19   Short Term Goal #1 1  Complete bed mobility and transfers I to decrease need for caregiver in home  2  Ambulate 100' with close S to complete household and community mobility   3  Improve dynamic balance to good to decrease need for UE support during ambulation  4  Be educated & demonstate 12 steps to be able to enter home with S    Plan   Treatment/Interventions Functional transfer training; Endurance training;Patient/family training;Bed mobility;Gait training;Spoke to nursing;Spoke to case management;OT;Equipment eval/education   PT Frequency   (3-5x/wk)   Recommendation   Recommendation Outpatient PT  (Big and loud OP PT for PD)   PT - OK to Discharge Yes  (if family able to provide continued A)   Barthel Index   Feeding 5   Bathing 0   Grooming Score 0   Dressing Score 5   Bladder Score 10   Bowels Score 10   Toilet Use Score 5   Transfers (Bed/Chair) Score 5   Mobility (Level Surface) Score 0   Stairs Score 0   Barthel Index Score 40           Lio Castro, PT

## 2019-10-26 NOTE — DISCHARGE INSTRUCTIONS
Traumatic Laceration and Wound Care Instructions:     Wound Care:  - Wash laceration/wound daily, gently in the shower, do not scrub  Pat dry with clean towel  Do NOT immerse completely in water (i e  tub or swimming pool) until cleared by trauma  - Follow-up with the Trauma Service as directed for suture/staple removal   - Call office if you develop fever/chills, redness/swelling/drainage from the site      Additional Instructions:  - If you have any questions or concerns after discharge please call the office   - Call office or return to ER if fever greater than 101, chills, increasing redness/swelling at site of laceration/wound, purulent or foul smelling drainage from laceration/wound, and/or worsening/uncontrollable pain       -Sinus Precautions: no blowing nose, no bearing down forcefully, sneeze with mouth open  -Ice to face 20 min on, 10 min off, up to 24 hours  -Antibiotic: Augmentin 875mg x 7days

## 2019-10-26 NOTE — CONSULTS
Consultation - Macy Amaya 80 y o  female MRN: [id number]  Unit/Bed#: [facility] 604-01 Encounter: [id number]          History of Present Illness   Physician Requesting Consult: Carline Almanza MD  Reason for Consult / Principal Problem: Facial trauma    HPI:  Karine Rodriguez is a 80 y o  female who presents with her daughter to ED s/p fall  Patient's daughter states that earlier today that her mother was walking from the bathroom and slipped on the carpet, falling forward and hitting her head on the floor  Patient denies LOC  Left lateral eye brown lac was repaired in the ED  OMFS was consulted for facial trauma evaluation  Patient with left sided facial pain, 5/10, improved with pain meds  Worsening upon palpation and touch  Patient denies dyspnea, dysphagia, nausea, vomiting, fever or chills  Consults    Review of Systems   Constitutional: Negative  HENT: Positive for facial swelling and sinus pain  Negative for dental problem, nosebleeds and trouble swallowing  Eyes: Negative  Respiratory: Negative  Cardiovascular: Negative  Gastrointestinal: Negative  Endocrine: Negative  Genitourinary: Negative  Musculoskeletal: Negative  Skin: Negative  Allergic/Immunologic: Negative  Neurological: Negative  Hematological: Negative  Psychiatric/Behavioral: Negative  All other systems reviewed and are negative        Historical Information   Past Medical History:   Diagnosis Date    Constipation     Hypertension     Osteoporosis     Parkinson disease ([facility] [address] )      Past Surgical History:   Procedure Laterality Date    BASAL CELL CARCINOMA EXCISION      above lip    BREAST CYST EXCISION      COLONOSCOPY      HYSTERECTOMY      ROTATOR CUFF REPAIR Right      Social History   Social History     Substance and Sexual Activity   Alcohol Use No     Social History     Substance and Sexual Activity   Drug Use No     Social History     Tobacco Use   Smoking Status Never Smoker Smokeless Tobacco Never Used     Family History   Problem Relation Age of Onset    Heart attack Mother        Meds/Allergies   Current Facility-Administered Medications   Medication Dose Route Frequency    acetaminophen (TYLENOL) tablet 975 mg  975 mg Oral Q8H [address]    [START ON 10/26/2019] carbidopa-levodopa-entacapone (STALEVO) 37 5-150-200 MG per tablet 1 tablet  1 tablet Oral Daily    docusate sodium (COLACE) capsule 100 mg  100 mg Oral BID    HYDROmorphone (DILAUDID) injection 0 2 mg  0 2 mg Intravenous Q4H PRN    [START ON 10/26/2019] levothyroxine tablet 75 mcg  75 mcg Oral Daily    [START ON 10/26/2019] losartan (COZAAR) tablet 100 mg  100 mg Oral Daily    multi-electrolyte (PLASMALYTE-A/ISOLYTE-S PH 7 4) IV solution  50 mL/hr Intravenous Continuous    naloxone (NARCAN) 0 04 mg/mL syringe 0 04 mg  0 04 mg Intravenous Q1MIN PRN    ondansetron (ZOFRAN) injection 4 mg  4 mg Intravenous Q4H PRN    [START ON 10/26/2019] oxybutynin (DITROPAN-XL) 24 hr tablet 10 mg  10 mg Oral Daily    oxyCODONE (ROXICODONE) IR tablet 2 5 mg  2 5 mg Oral Q4H PRN    oxyCODONE (ROXICODONE) IR tablet 5 mg  5 mg Oral Q4H PRN    polyethylene glycol (MIRALAX) packet 17 g  17 g Oral Daily PRN    [START ON 10/26/2019] rotigotine (NEUPRO) 1 MG/24HR transdermal patch 1 patch  1 patch Transdermal Daily    senna-docusate sodium (SENOKOT S) 8 6-50 mg per tablet 1 tablet  1 tablet Oral BID    tetanus-diphtheria-acellular pertussis (BOOSTRIX) IM injection 0 5 mL  0 5 mL Intramuscular Once     Medications Prior to Admission   Medication    Memantine HCl ER 28 MG CP24    rotigotine (NEUPRO) 1 MG/24HR transdermal patch    alendronate (FOSAMAX) 70 mg tablet    aspirin (ASPIR-LOW) 81 mg EC tablet    carbidopa-levodopa-entacapone (STALEVO) 37 5-150-200 MG per tablet    levothyroxine 50 mcg tablet    levothyroxine 75 mcg tablet    losartan (COZAAR) 100 MG tablet    losartan (COZAAR) 50 mg tablet    Memantine HCl ER (NAMENDA XR) 28 MG CP24    Mirabegron ER (MYRBETRIQ) 50 MG TB24    MYRBETRIQ 25 MG TB24    NAMENDA XR 28 MG CP24    rotigotine (NEUPRO) 1 MG/24HR transdermal patch    ZETIA 10 MG tablet     No Known Allergies    Objective   Vitals:   Blood Pressure: 124/69 (10/25/19 1917), Pulse: 89 (10/25/19 1917), Temperature: 98 2 °F (36 8 °C) (10/25/19 1917), Temp Source: Oral (10/25/19 1642), Respirations: 20 (10/25/19 1642)      Physical Exam   Constitutional: She is oriented to person, place, and time  Vital signs are normal  She appears well-developed  She is cooperative  HENT:   Head: Normocephalic  Head is with abrasion, with laceration and with left periorbital erythema  Mouth/Throat: Uvula is midline, oropharynx is clear and moist and mucous membranes are normal    Eyes: Pupils are equal, round, and reactive to light  EOM and lids are normal  Left eye exhibits no chemosis  Left conjunctiva is injected  Left conjunctiva has a hemorrhage  Right eye exhibits normal extraocular motion and no nystagmus  Left eye exhibits normal extraocular motion and no nystagmus  Neck: Normal range of motion  Cardiovascular: Normal rate and regular rhythm  Pulmonary/Chest: Effort normal and breath sounds normal    Neurological: She is alert and oriented to person, place, and time  Nursing note and vitals reviewed         Lab Results:   Admission on 10/25/2019, Discharged on 10/25/2019   Component Date Value    WBC 10/25/2019 9 33     RBC 10/25/2019 4 13     Hemoglobin 10/25/2019 13 6     Hematocrit 10/25/2019 42 6     MCV 10/25/2019 103*    MCH 10/25/2019 32 9     MCHC 10/25/2019 31 9     RDW 10/25/2019 13 2     MPV 10/25/2019 9 9     Platelets 76/88/2025 188     nRBC 10/25/2019 0     Sodium 10/25/2019 144     Potassium 10/25/2019 5 2     Chloride 10/25/2019 107     CO2 10/25/2019 29     ANION GAP 10/25/2019 8     BUN 10/25/2019 30*    Creatinine 10/25/2019 0 85     Glucose 10/25/2019 113     Calcium 10/25/2019 9 0     eGFR 10/25/2019 63     Segmented % 10/25/2019 87*    Lymphocytes % 10/25/2019 11*    Monocytes % 10/25/2019 2*    Eosinophils, % 10/25/2019 0     Basophils % 10/25/2019 0     Absolute Neutrophils 10/25/2019 8 12*    Lymphocytes Absolute 10/25/2019 1 03     Monocytes Absolute 10/25/2019 0 19     Eosinophils Absolute 10/25/2019 0 00     Basophils Absolute 10/25/2019 0 00     Total Counted 10/25/2019 100     Platelet Estimate 44/48/7011 Adequate        Assessment/Plan     Imaging Studies: I have personally reviewed pertinent reports  CT face: showing comminuted left anterior maxillary wall fracture, left orbital floor and rim fracture, and left ZMC fracture  All fractures are minimally displaced  Assessment:  84F s/p fall sustaining minimally displaced left orbital floor fracture, anterior maxillary wall fracture and left ZMC  EOMI, no diplopia  No surgical interventions needed by OMFS      Plan:  No surgical intervention by OMFS    Recs:   - Appreciate care by trauma team  - Obtain Ophthomology consult  -HOB 30 degrees  -Sinus Precautions: no blowing nose, no bearing down forcefully, sneeze with mouth open  -Ice to face 20 min on, 10 min off, up to 24 hours  -Antibiotic: Augmentin 875mg x 7days    Case discussed with attending Dr Millicent Alonzo, ASHISH  OMFS resident, PGY3

## 2019-10-27 VITALS
WEIGHT: 94.8 LBS | RESPIRATION RATE: 16 BRPM | HEART RATE: 74 BPM | DIASTOLIC BLOOD PRESSURE: 75 MMHG | TEMPERATURE: 97.5 F | HEIGHT: 59 IN | OXYGEN SATURATION: 96 % | BODY MASS INDEX: 19.11 KG/M2 | SYSTOLIC BLOOD PRESSURE: 139 MMHG

## 2019-10-27 PROCEDURE — 99238 HOSP IP/OBS DSCHRG MGMT 30/<: CPT | Performed by: SURGERY

## 2019-10-27 RX ADMIN — OXYBUTYNIN CHLORIDE 10 MG: 10 TABLET, EXTENDED RELEASE ORAL at 08:47

## 2019-10-27 RX ADMIN — CARBIDOPA AND LEVODOPA 1.5 TABLET: 25; 100 TABLET ORAL at 08:48

## 2019-10-27 RX ADMIN — SENNOSIDES AND DOCUSATE SODIUM 1 TABLET: 8.6; 5 TABLET ORAL at 08:47

## 2019-10-27 RX ADMIN — LOSARTAN POTASSIUM 100 MG: 50 TABLET, FILM COATED ORAL at 08:47

## 2019-10-27 RX ADMIN — ENTACAPONE 200 MG: 200 TABLET ORAL at 08:47

## 2019-10-27 RX ADMIN — DOCUSATE SODIUM 100 MG: 100 CAPSULE, LIQUID FILLED ORAL at 08:47

## 2019-11-04 ENCOUNTER — OFFICE VISIT (OUTPATIENT)
Dept: SURGERY | Facility: CLINIC | Age: 84
End: 2019-11-04
Payer: MEDICARE

## 2019-11-04 VITALS
TEMPERATURE: 98.3 F | HEIGHT: 59 IN | SYSTOLIC BLOOD PRESSURE: 106 MMHG | DIASTOLIC BLOOD PRESSURE: 66 MMHG | BODY MASS INDEX: 19.15 KG/M2 | HEART RATE: 58 BPM | RESPIRATION RATE: 13 BRPM

## 2019-11-04 DIAGNOSIS — S02.85XA ORBITAL FRACTURE (HCC): Primary | ICD-10-CM

## 2019-11-04 DIAGNOSIS — S01.81XD FACIAL LACERATION, SUBSEQUENT ENCOUNTER: ICD-10-CM
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
## 2019-11-04 PROCEDURE — 99213 OFFICE O/P EST LOW 20 MIN: CPT | Performed by: SURGERY

## 2019-11-04 NOTE — PROGRESS NOTES
Assessment/Plan:    79 yo female s/p fall with facial fractures and lac  Following up with OMFS for fractures and ophalmology  Sutures removed here today  F/u prn  Subjective:      Patient ID: Dave Gandara is a 80 y o  female  Triage Notes: Pt is here to f/u from ED visit and have her sutures removed from the left eyebrow  Pt's daughter states she is not having any pain or discomfort  79-year-old lady with a history of Parkinson's who has gait and balance issues and she lost her balance and fell in the bathroom and  struck her head on the vanity (in the setting of aspirin therapy)  She did not lose consciousness  and remembers everything that happened  Presented to our ED with head and left arm pain  Transferred to [facility] for trauma services and the head lac was repaired, CT diagnosed facial fractures and left arm was negative  He is here today for suture removal              Review of Systems   Constitutional: Negative for activity change, appetite change, chills and fever  HENT: Positive for hearing loss  Negative for congestion, sore throat and trouble swallowing  Eyes: Negative for discharge and visual disturbance  Respiratory: Negative for cough, chest tightness, shortness of breath and wheezing  Cardiovascular: Negative for chest pain and palpitations  Gastrointestinal: Negative for abdominal pain  Endocrine: Negative for cold intolerance and heat intolerance  Genitourinary: Negative for difficulty urinating  Musculoskeletal: Positive for gait problem  Skin: Negative for color change, rash and wound  Neurological: Negative for dizziness, speech difficulty and headaches  Psychiatric/Behavioral: Negative for behavioral problems and confusion (occasional)  The patient is not nervous/anxious  Objective:    Vitals:    11/04/19 1332   BP: 106/66   Pulse: 58   Resp: 13   Temp: 98 3 °F (36 8 °C)       There were no vitals taken for this visit           Physical Exam Constitutional: She is oriented to person, place, and time  No distress  Thin and somewhat frail older female   HENT:   Periorbital echymosis  Sutures removed, lac well healed  Eyes: Pupils are equal, round, and reactive to light  EOM are normal    Neck: Normal range of motion  Neck supple  Cardiovascular: Normal rate and regular rhythm  Pulmonary/Chest: Effort normal and breath sounds normal    Abdominal: Soft  Musculoskeletal: Normal range of motion  Neurological: She is alert and oriented to person, place, and time  Skin: Skin is warm and dry  She is not diaphoretic  Psychiatric: She has a normal mood and affect  Nursing note and vitals reviewed

## 2020-01-01 ENCOUNTER — HOSPITAL ENCOUNTER (INPATIENT)
Facility: HOSPITAL | Age: 85
LOS: 3 days | Discharge: HOME | DRG: 682 | End: 2020-10-24
Attending: EMERGENCY MEDICINE | Admitting: STUDENT IN AN ORGANIZED HEALTH CARE EDUCATION/TRAINING PROGRAM
Payer: MEDICARE

## 2020-01-01 ENCOUNTER — APPOINTMENT (EMERGENCY)
Dept: CT IMAGING | Facility: HOSPITAL | Age: 85
DRG: 682 | End: 2020-01-01
Payer: MEDICARE

## 2020-01-01 ENCOUNTER — TELEPHONE (OUTPATIENT)
Dept: GASTROENTEROLOGY | Facility: CLINIC | Age: 85
End: 2020-01-01

## 2020-01-01 ENCOUNTER — ANESTHESIA (INPATIENT)
Dept: GASTROENTEROLOGY | Facility: HOSPITAL | Age: 85
DRG: 682 | End: 2020-01-01
Payer: MEDICARE

## 2020-01-01 ENCOUNTER — APPOINTMENT (EMERGENCY)
Dept: ULTRASOUND IMAGING | Facility: HOSPITAL | Age: 85
DRG: 682 | End: 2020-01-01
Payer: MEDICARE

## 2020-01-01 ENCOUNTER — ANESTHESIA EVENT (INPATIENT)
Dept: GASTROENTEROLOGY | Facility: HOSPITAL | Age: 85
DRG: 682 | End: 2020-01-01
Payer: MEDICARE

## 2020-01-01 ENCOUNTER — APPOINTMENT (INPATIENT)
Dept: GASTROENTEROLOGY | Facility: HOSPITAL | Age: 85
DRG: 682 | End: 2020-01-01
Payer: MEDICARE

## 2020-01-01 VITALS — HEART RATE: 64 BPM

## 2020-01-01 VITALS
DIASTOLIC BLOOD PRESSURE: 70 MMHG | BODY MASS INDEX: 23.51 KG/M2 | HEIGHT: 59 IN | RESPIRATION RATE: 16 BRPM | WEIGHT: 116.62 LBS | TEMPERATURE: 97.7 F | SYSTOLIC BLOOD PRESSURE: 135 MMHG | HEART RATE: 64 BPM | OXYGEN SATURATION: 97 %

## 2020-01-01 DIAGNOSIS — N17.9 ACUTE RENAL FAILURE (ARF) (HCC): Primary | ICD-10-CM

## 2020-01-01 DIAGNOSIS — D62 ACUTE BLOOD LOSS ANEMIA: ICD-10-CM

## 2020-01-01 DIAGNOSIS — K92.1 HEMATOCHEZIA: ICD-10-CM

## 2020-01-01 DIAGNOSIS — I21.4 NSTEMI (NON-ST ELEVATED MYOCARDIAL INFARCTION) (HCC): ICD-10-CM

## 2020-01-01 DIAGNOSIS — K92.1 BLOODY STOOL: ICD-10-CM

## 2020-01-01 DIAGNOSIS — R19.7 DIARRHEA: ICD-10-CM

## 2020-01-01 DIAGNOSIS — R64 CACHEXIA (HCC): ICD-10-CM

## 2020-01-01 DIAGNOSIS — K59.01 SLOW TRANSIT CONSTIPATION: ICD-10-CM

## 2020-01-01 DIAGNOSIS — G20 PARKINSON'S DISEASE (HCC): ICD-10-CM

## 2020-01-01 LAB
25(OH)D2 SERPL-MCNC: <1 NG/ML
25(OH)D3 SERPL-MCNC: 24 NG/ML
25(OH)D3+25(OH)D2 SERPL-MCNC: 25 NG/ML
ABO GROUP BLD: NORMAL
ABO GROUP BLD: NORMAL
ALBUMIN SERPL BCP-MCNC: 3 G/DL (ref 3.5–5)
ALP SERPL-CCNC: 58 U/L (ref 46–116)
ALT SERPL W P-5'-P-CCNC: 44 U/L (ref 12–78)
ANION GAP SERPL CALCULATED.3IONS-SCNC: 7 MMOL/L (ref 4–13)
ANION GAP SERPL CALCULATED.3IONS-SCNC: 8 MMOL/L (ref 4–13)
APTT PPP: 35 SECONDS (ref 23–37)
AST SERPL W P-5'-P-CCNC: 171 U/L (ref 5–45)
ATRIAL RATE: 71 BPM
BASOPHILS # BLD AUTO: 0 THOUSANDS/ΜL (ref 0–0.1)
BASOPHILS # BLD AUTO: 0.02 THOUSANDS/ΜL (ref 0–0.1)
BASOPHILS NFR BLD AUTO: 0 % (ref 0–1)
BASOPHILS NFR BLD AUTO: 1 % (ref 0–1)
BILIRUB SERPL-MCNC: 0.4 MG/DL (ref 0.2–1)
BLD GP AB SCN SERPL QL: NEGATIVE
BUN SERPL-MCNC: 16 MG/DL (ref 5–25)
BUN SERPL-MCNC: 26 MG/DL (ref 5–25)
BUN SERPL-MCNC: 46 MG/DL (ref 5–25)
BUN SERPL-MCNC: 73 MG/DL (ref 5–25)
CALCIUM ALBUM COR SERPL-MCNC: 9.5 MG/DL (ref 8.3–10.1)
CALCIUM SERPL-MCNC: 7.5 MG/DL (ref 8.3–10.1)
CALCIUM SERPL-MCNC: 7.7 MG/DL (ref 8.3–10.1)
CALCIUM SERPL-MCNC: 7.7 MG/DL (ref 8.3–10.1)
CALCIUM SERPL-MCNC: 8.7 MG/DL (ref 8.3–10.1)
CHLORIDE SERPL-SCNC: 103 MMOL/L (ref 100–108)
CHLORIDE SERPL-SCNC: 113 MMOL/L (ref 100–108)
CHLORIDE SERPL-SCNC: 114 MMOL/L (ref 100–108)
CHLORIDE SERPL-SCNC: 117 MMOL/L (ref 100–108)
CHOLEST SERPL-MCNC: 113 MG/DL (ref 50–200)
CO2 SERPL-SCNC: 22 MMOL/L (ref 21–32)
CO2 SERPL-SCNC: 22 MMOL/L (ref 21–32)
CO2 SERPL-SCNC: 24 MMOL/L (ref 21–32)
CO2 SERPL-SCNC: 28 MMOL/L (ref 21–32)
CREAT SERPL-MCNC: 0.63 MG/DL (ref 0.6–1.3)
CREAT SERPL-MCNC: 0.74 MG/DL (ref 0.6–1.3)
CREAT SERPL-MCNC: 1.13 MG/DL (ref 0.6–1.3)
CREAT SERPL-MCNC: 2.34 MG/DL (ref 0.6–1.3)
EOSINOPHIL # BLD AUTO: 0.01 THOUSAND/ΜL (ref 0–0.61)
EOSINOPHIL # BLD AUTO: 0.22 THOUSAND/ΜL (ref 0–0.61)
EOSINOPHIL NFR BLD AUTO: 0 % (ref 0–6)
EOSINOPHIL NFR BLD AUTO: 6 % (ref 0–6)
ERYTHROCYTE [DISTWIDTH] IN BLOOD BY AUTOMATED COUNT: 13.5 % (ref 11.6–15.1)
ERYTHROCYTE [DISTWIDTH] IN BLOOD BY AUTOMATED COUNT: 13.6 % (ref 11.6–15.1)
ERYTHROCYTE [DISTWIDTH] IN BLOOD BY AUTOMATED COUNT: 13.8 % (ref 11.6–15.1)
ERYTHROCYTE [DISTWIDTH] IN BLOOD BY AUTOMATED COUNT: 14 % (ref 11.6–15.1)
EST. AVERAGE GLUCOSE BLD GHB EST-MCNC: 97 MG/DL
FOLATE SERPL-MCNC: 13.1 NG/ML (ref 3.1–17.5)
GFR SERPL CREATININE-BSD FRML MDRD: 18 ML/MIN/1.73SQ M
GFR SERPL CREATININE-BSD FRML MDRD: 44 ML/MIN/1.73SQ M
GFR SERPL CREATININE-BSD FRML MDRD: 74 ML/MIN/1.73SQ M
GFR SERPL CREATININE-BSD FRML MDRD: 82 ML/MIN/1.73SQ M
GLUCOSE SERPL-MCNC: 68 MG/DL (ref 65–140)
GLUCOSE SERPL-MCNC: 69 MG/DL (ref 65–140)
GLUCOSE SERPL-MCNC: 72 MG/DL (ref 65–140)
GLUCOSE SERPL-MCNC: 80 MG/DL (ref 65–140)
HBA1C MFR BLD: 5 %
HCT VFR BLD AUTO: 26.9 % (ref 34.8–46.1)
HCT VFR BLD AUTO: 28.7 % (ref 34.8–46.1)
HCT VFR BLD AUTO: 29.6 % (ref 34.8–46.1)
HCT VFR BLD AUTO: 30.1 % (ref 34.8–46.1)
HCT VFR BLD AUTO: 30.9 % (ref 34.8–46.1)
HCT VFR BLD AUTO: 38.2 % (ref 34.8–46.1)
HDLC SERPL-MCNC: 46 MG/DL
HGB BLD-MCNC: 12.3 G/DL (ref 11.5–15.4)
HGB BLD-MCNC: 8.5 G/DL (ref 11.5–15.4)
HGB BLD-MCNC: 8.9 G/DL (ref 11.5–15.4)
HGB BLD-MCNC: 9.3 G/DL (ref 11.5–15.4)
IMM GRANULOCYTES # BLD AUTO: 0.04 THOUSAND/UL (ref 0–0.2)
IMM GRANULOCYTES # BLD AUTO: 0.06 THOUSAND/UL (ref 0–0.2)
IMM GRANULOCYTES NFR BLD AUTO: 1 % (ref 0–2)
IMM GRANULOCYTES NFR BLD AUTO: 1 % (ref 0–2)
INR PPP: 1.2 (ref 0.84–1.19)
LDLC SERPL CALC-MCNC: 58 MG/DL (ref 0–100)
LYMPHOCYTES # BLD AUTO: 0.87 THOUSANDS/ΜL (ref 0.6–4.47)
LYMPHOCYTES # BLD AUTO: 1.12 THOUSANDS/ΜL (ref 0.6–4.47)
LYMPHOCYTES NFR BLD AUTO: 11 % (ref 14–44)
LYMPHOCYTES NFR BLD AUTO: 31 % (ref 14–44)
MAGNESIUM SERPL-MCNC: 2.5 MG/DL (ref 1.6–2.6)
MCH RBC QN AUTO: 32 PG (ref 26.8–34.3)
MCH RBC QN AUTO: 32 PG (ref 26.8–34.3)
MCH RBC QN AUTO: 32.1 PG (ref 26.8–34.3)
MCH RBC QN AUTO: 32.2 PG (ref 26.8–34.3)
MCHC RBC AUTO-ENTMCNC: 30.1 G/DL (ref 31.4–37.4)
MCHC RBC AUTO-ENTMCNC: 30.9 G/DL (ref 31.4–37.4)
MCHC RBC AUTO-ENTMCNC: 31.4 G/DL (ref 31.4–37.4)
MCHC RBC AUTO-ENTMCNC: 32.2 G/DL (ref 31.4–37.4)
MCV RBC AUTO: 100 FL (ref 82–98)
MCV RBC AUTO: 102 FL (ref 82–98)
MCV RBC AUTO: 103 FL (ref 82–98)
MCV RBC AUTO: 106 FL (ref 82–98)
MONOCYTES # BLD AUTO: 0.32 THOUSAND/ΜL (ref 0.17–1.22)
MONOCYTES # BLD AUTO: 0.33 THOUSAND/ΜL (ref 0.17–1.22)
MONOCYTES NFR BLD AUTO: 4 % (ref 4–12)
MONOCYTES NFR BLD AUTO: 9 % (ref 4–12)
NEUTROPHILS # BLD AUTO: 1.88 THOUSANDS/ΜL (ref 1.85–7.62)
NEUTROPHILS # BLD AUTO: 6.79 THOUSANDS/ΜL (ref 1.85–7.62)
NEUTS SEG NFR BLD AUTO: 52 % (ref 43–75)
NEUTS SEG NFR BLD AUTO: 84 % (ref 43–75)
NRBC BLD AUTO-RTO: 0 /100 WBCS
NRBC BLD AUTO-RTO: 0 /100 WBCS
P AXIS: 46 DEGREES
PLATELET # BLD AUTO: 113 THOUSANDS/UL (ref 149–390)
PLATELET # BLD AUTO: 118 THOUSANDS/UL (ref 149–390)
PLATELET # BLD AUTO: 119 THOUSANDS/UL (ref 149–390)
PLATELET # BLD AUTO: 137 THOUSANDS/UL (ref 149–390)
PLATELET # BLD AUTO: 149 THOUSANDS/UL (ref 149–390)
PMV BLD AUTO: 10.3 FL (ref 8.9–12.7)
PMV BLD AUTO: 10.4 FL (ref 8.9–12.7)
PMV BLD AUTO: 10.7 FL (ref 8.9–12.7)
PMV BLD AUTO: 10.7 FL (ref 8.9–12.7)
PMV BLD AUTO: 11 FL (ref 8.9–12.7)
POTASSIUM SERPL-SCNC: 3.9 MMOL/L (ref 3.5–5.3)
POTASSIUM SERPL-SCNC: 4 MMOL/L (ref 3.5–5.3)
POTASSIUM SERPL-SCNC: 4.7 MMOL/L (ref 3.5–5.3)
POTASSIUM SERPL-SCNC: 5 MMOL/L (ref 3.5–5.3)
PR INTERVAL: 130 MS
PROT SERPL-MCNC: 6.7 G/DL (ref 6.4–8.2)
PROTHROMBIN TIME: 14.6 SECONDS (ref 11.6–14.5)
QRS AXIS: 6 DEGREES
QRSD INTERVAL: 70 MS
QT INTERVAL: 440 MS
QTC INTERVAL: 478 MS
RBC # BLD AUTO: 2.9 MILLION/UL (ref 3.81–5.12)
RBC # BLD AUTO: 2.91 MILLION/UL (ref 3.81–5.12)
RBC # BLD AUTO: 2.91 MILLION/UL (ref 3.81–5.12)
RBC # BLD AUTO: 3.82 MILLION/UL (ref 3.81–5.12)
RH BLD: POSITIVE
RH BLD: POSITIVE
SARS-COV-2 RNA RESP QL NAA+PROBE: NEGATIVE
SODIUM SERPL-SCNC: 139 MMOL/L (ref 136–145)
SODIUM SERPL-SCNC: 143 MMOL/L (ref 136–145)
SODIUM SERPL-SCNC: 144 MMOL/L (ref 136–145)
SODIUM SERPL-SCNC: 148 MMOL/L (ref 136–145)
SPECIMEN EXPIRATION DATE: NORMAL
T WAVE AXIS: 39 DEGREES
TRIGL SERPL-MCNC: 45 MG/DL
TROPONIN I SERPL-MCNC: 0.43 NG/ML
TROPONIN I SERPL-MCNC: 0.44 NG/ML
TROPONIN I SERPL-MCNC: 0.44 NG/ML
TROPONIN I SERPL-MCNC: 0.5 NG/ML
TSH SERPL DL<=0.05 MIU/L-ACNC: 5.48 UIU/ML (ref 0.36–3.74)
VENTRICULAR RATE: 71 BPM
VIT B12 SERPL-MCNC: 449 PG/ML (ref 100–900)
WBC # BLD AUTO: 3.04 THOUSAND/UL (ref 4.31–10.16)
WBC # BLD AUTO: 3.61 THOUSAND/UL (ref 4.31–10.16)
WBC # BLD AUTO: 3.75 THOUSAND/UL (ref 4.31–10.16)
WBC # BLD AUTO: 8.05 THOUSAND/UL (ref 4.31–10.16)

## 2020-01-01 PROCEDURE — 88305 TISSUE EXAM BY PATHOLOGIST: CPT | Performed by: PATHOLOGY

## 2020-01-01 PROCEDURE — 85027 COMPLETE CBC AUTOMATED: CPT | Performed by: FAMILY MEDICINE

## 2020-01-01 PROCEDURE — 87635 SARS-COV-2 COVID-19 AMP PRB: CPT | Performed by: PHYSICIAN ASSISTANT

## 2020-01-01 PROCEDURE — G0008 ADMIN INFLUENZA VIRUS VAC: HCPCS | Performed by: FAMILY MEDICINE

## 2020-01-01 PROCEDURE — 93971 EXTREMITY STUDY: CPT | Performed by: SURGERY

## 2020-01-01 PROCEDURE — 80048 BASIC METABOLIC PNL TOTAL CA: CPT | Performed by: INTERNAL MEDICINE

## 2020-01-01 PROCEDURE — 85730 THROMBOPLASTIN TIME PARTIAL: CPT | Performed by: PHYSICIAN ASSISTANT

## 2020-01-01 PROCEDURE — 82306 VITAMIN D 25 HYDROXY: CPT | Performed by: PHYSICIAN ASSISTANT

## 2020-01-01 PROCEDURE — 85014 HEMATOCRIT: CPT | Performed by: PHYSICIAN ASSISTANT

## 2020-01-01 PROCEDURE — C9113 INJ PANTOPRAZOLE SODIUM, VIA: HCPCS | Performed by: PHYSICIAN ASSISTANT

## 2020-01-01 PROCEDURE — 90662 IIV NO PRSV INCREASED AG IM: CPT | Performed by: FAMILY MEDICINE

## 2020-01-01 PROCEDURE — 96360 HYDRATION IV INFUSION INIT: CPT

## 2020-01-01 PROCEDURE — 85049 AUTOMATED PLATELET COUNT: CPT | Performed by: FAMILY MEDICINE

## 2020-01-01 PROCEDURE — 93971 EXTREMITY STUDY: CPT

## 2020-01-01 PROCEDURE — 88341 IMHCHEM/IMCYTCHM EA ADD ANTB: CPT | Performed by: PATHOLOGY

## 2020-01-01 PROCEDURE — 74176 CT ABD & PELVIS W/O CONTRAST: CPT

## 2020-01-01 PROCEDURE — 88342 IMHCHEM/IMCYTCHM 1ST ANTB: CPT | Performed by: PATHOLOGY

## 2020-01-01 PROCEDURE — 86901 BLOOD TYPING SEROLOGIC RH(D): CPT | Performed by: PHYSICIAN ASSISTANT

## 2020-01-01 PROCEDURE — 99232 SBSQ HOSP IP/OBS MODERATE 35: CPT | Performed by: INTERNAL MEDICINE

## 2020-01-01 PROCEDURE — 0DBN8ZX EXCISION OF SIGMOID COLON, VIA NATURAL OR ARTIFICIAL OPENING ENDOSCOPIC, DIAGNOSTIC: ICD-10-PCS | Performed by: INTERNAL MEDICINE

## 2020-01-01 PROCEDURE — 93005 ELECTROCARDIOGRAM TRACING: CPT

## 2020-01-01 PROCEDURE — 85018 HEMOGLOBIN: CPT | Performed by: PHYSICIAN ASSISTANT

## 2020-01-01 PROCEDURE — 83036 HEMOGLOBIN GLYCOSYLATED A1C: CPT | Performed by: PHYSICIAN ASSISTANT

## 2020-01-01 PROCEDURE — 80053 COMPREHEN METABOLIC PANEL: CPT | Performed by: PHYSICIAN ASSISTANT

## 2020-01-01 PROCEDURE — 82746 ASSAY OF FOLIC ACID SERUM: CPT | Performed by: INTERNAL MEDICINE

## 2020-01-01 PROCEDURE — 99223 1ST HOSP IP/OBS HIGH 75: CPT | Performed by: FAMILY MEDICINE

## 2020-01-01 PROCEDURE — 97163 PT EVAL HIGH COMPLEX 45 MIN: CPT

## 2020-01-01 PROCEDURE — 84443 ASSAY THYROID STIM HORMONE: CPT | Performed by: PHYSICIAN ASSISTANT

## 2020-01-01 PROCEDURE — 85027 COMPLETE CBC AUTOMATED: CPT | Performed by: PHYSICIAN ASSISTANT

## 2020-01-01 PROCEDURE — 85025 COMPLETE CBC W/AUTO DIFF WBC: CPT | Performed by: INTERNAL MEDICINE

## 2020-01-01 PROCEDURE — 90732 PPSV23 VACC 2 YRS+ SUBQ/IM: CPT | Performed by: FAMILY MEDICINE

## 2020-01-01 PROCEDURE — 82607 VITAMIN B-12: CPT | Performed by: INTERNAL MEDICINE

## 2020-01-01 PROCEDURE — 45331 SIGMOIDOSCOPY AND BIOPSY: CPT | Performed by: INTERNAL MEDICINE

## 2020-01-01 PROCEDURE — 80048 BASIC METABOLIC PNL TOTAL CA: CPT | Performed by: FAMILY MEDICINE

## 2020-01-01 PROCEDURE — 86900 BLOOD TYPING SEROLOGIC ABO: CPT | Performed by: PHYSICIAN ASSISTANT

## 2020-01-01 PROCEDURE — 99223 1ST HOSP IP/OBS HIGH 75: CPT | Performed by: INTERNAL MEDICINE

## 2020-01-01 PROCEDURE — 99291 CRITICAL CARE FIRST HOUR: CPT | Performed by: PHYSICIAN ASSISTANT

## 2020-01-01 PROCEDURE — 85610 PROTHROMBIN TIME: CPT | Performed by: PHYSICIAN ASSISTANT

## 2020-01-01 PROCEDURE — 93010 ELECTROCARDIOGRAM REPORT: CPT | Performed by: INTERNAL MEDICINE

## 2020-01-01 PROCEDURE — 83735 ASSAY OF MAGNESIUM: CPT | Performed by: FAMILY MEDICINE

## 2020-01-01 PROCEDURE — 36415 COLL VENOUS BLD VENIPUNCTURE: CPT | Performed by: PHYSICIAN ASSISTANT

## 2020-01-01 PROCEDURE — 97167 OT EVAL HIGH COMPLEX 60 MIN: CPT

## 2020-01-01 PROCEDURE — 99285 EMERGENCY DEPT VISIT HI MDM: CPT

## 2020-01-01 PROCEDURE — 84484 ASSAY OF TROPONIN QUANT: CPT | Performed by: FAMILY MEDICINE

## 2020-01-01 PROCEDURE — 80061 LIPID PANEL: CPT | Performed by: PHYSICIAN ASSISTANT

## 2020-01-01 PROCEDURE — 85025 COMPLETE CBC W/AUTO DIFF WBC: CPT | Performed by: PHYSICIAN ASSISTANT

## 2020-01-01 PROCEDURE — 99239 HOSP IP/OBS DSCHRG MGMT >30: CPT | Performed by: INTERNAL MEDICINE

## 2020-01-01 PROCEDURE — 80048 BASIC METABOLIC PNL TOTAL CA: CPT | Performed by: PHYSICIAN ASSISTANT

## 2020-01-01 PROCEDURE — G0009 ADMIN PNEUMOCOCCAL VACCINE: HCPCS | Performed by: FAMILY MEDICINE

## 2020-01-01 PROCEDURE — 1124F ACP DISCUSS-NO DSCNMKR DOCD: CPT | Performed by: INTERNAL MEDICINE

## 2020-01-01 PROCEDURE — G1004 CDSM NDSC: HCPCS

## 2020-01-01 PROCEDURE — 86850 RBC ANTIBODY SCREEN: CPT | Performed by: PHYSICIAN ASSISTANT

## 2020-01-01 PROCEDURE — 84484 ASSAY OF TROPONIN QUANT: CPT | Performed by: PHYSICIAN ASSISTANT

## 2020-01-01 RX ORDER — HYDROCORTISONE 25 MG/G
CREAM TOPICAL 2 TIMES DAILY
Status: DISCONTINUED | OUTPATIENT
Start: 2020-01-01 | End: 2020-01-01 | Stop reason: HOSPADM

## 2020-01-01 RX ORDER — PANTOPRAZOLE SODIUM 40 MG/1
40 TABLET, DELAYED RELEASE ORAL DAILY
Qty: 30 TABLET | Refills: 0 | Status: SHIPPED | OUTPATIENT
Start: 2020-01-01 | End: 2021-01-01 | Stop reason: HOSPADM

## 2020-01-01 RX ORDER — CARBIDOPA, LEVODOPA AND ENTACAPONE 18.75; 200; 75 MG/1; MG/1; MG/1
1 TABLET, FILM COATED ORAL 2 TIMES DAILY
Status: DISCONTINUED | OUTPATIENT
Start: 2020-01-01 | End: 2020-01-01 | Stop reason: HOSPADM

## 2020-01-01 RX ORDER — HYDROCORTISONE 25 MG/G
CREAM TOPICAL 4 TIMES DAILY PRN
Status: DISCONTINUED | OUTPATIENT
Start: 2020-01-01 | End: 2020-01-01

## 2020-01-01 RX ORDER — ASPIRIN 81 MG/1
81 TABLET ORAL DAILY
Status: DISCONTINUED | OUTPATIENT
Start: 2020-01-01 | End: 2020-01-01 | Stop reason: HOSPADM

## 2020-01-01 RX ORDER — SODIUM CHLORIDE 450 MG/100ML
50 INJECTION, SOLUTION INTRAVENOUS CONTINUOUS
Status: DISCONTINUED | OUTPATIENT
Start: 2020-01-01 | End: 2020-01-01

## 2020-01-01 RX ORDER — PANTOPRAZOLE SODIUM 40 MG/1
40 INJECTION, POWDER, FOR SOLUTION INTRAVENOUS EVERY 12 HOURS SCHEDULED
Status: DISCONTINUED | OUTPATIENT
Start: 2020-01-01 | End: 2020-01-01 | Stop reason: HOSPADM

## 2020-01-01 RX ORDER — CARBIDOPA, LEVODOPA AND ENTACAPONE 18.75; 200; 75 MG/1; MG/1; MG/1
1 TABLET, FILM COATED ORAL 2 TIMES DAILY
Qty: 60 TABLET | Refills: 0 | Status: SHIPPED | OUTPATIENT
Start: 2020-01-01 | End: 2021-01-01 | Stop reason: HOSPADM

## 2020-01-01 RX ORDER — CARBIDOPA, LEVODOPA AND ENTACAPONE 37.5; 200; 15 MG/1; MG/1; MG/1
1 TABLET, FILM COATED ORAL 2 TIMES DAILY
Status: DISCONTINUED | OUTPATIENT
Start: 2020-01-01 | End: 2020-01-01

## 2020-01-01 RX ORDER — POLYETHYLENE GLYCOL 3350 17 G/17G
17 POWDER, FOR SOLUTION ORAL DAILY
Status: DISCONTINUED | OUTPATIENT
Start: 2020-01-01 | End: 2020-01-01 | Stop reason: HOSPADM

## 2020-01-01 RX ORDER — MEMANTINE HYDROCHLORIDE 14 MG/1
14 CAPSULE, EXTENDED RELEASE ORAL DAILY
Status: DISCONTINUED | OUTPATIENT
Start: 2020-01-01 | End: 2020-01-01 | Stop reason: HOSPADM

## 2020-01-01 RX ORDER — HYDROCORTISONE 25 MG/G
CREAM TOPICAL 2 TIMES DAILY
Qty: 28 G | Refills: 0 | Status: SHIPPED | OUTPATIENT
Start: 2020-01-01 | End: 2021-01-01 | Stop reason: HOSPADM

## 2020-01-01 RX ORDER — OXYBUTYNIN CHLORIDE 5 MG/1
5 TABLET, EXTENDED RELEASE ORAL DAILY
Status: CANCELLED | OUTPATIENT
Start: 2020-01-01

## 2020-01-01 RX ORDER — ACETAMINOPHEN 325 MG/1
650 TABLET ORAL EVERY 4 HOURS PRN
Status: DISCONTINUED | OUTPATIENT
Start: 2020-01-01 | End: 2020-01-01 | Stop reason: HOSPADM

## 2020-01-01 RX ORDER — SODIUM CHLORIDE 9 MG/ML
75 INJECTION, SOLUTION INTRAVENOUS CONTINUOUS
Status: DISCONTINUED | OUTPATIENT
Start: 2020-01-01 | End: 2020-01-01

## 2020-01-01 RX ORDER — MEMANTINE HYDROCHLORIDE 5 MG/1
5 TABLET ORAL DAILY
Status: CANCELLED | OUTPATIENT
Start: 2020-01-01

## 2020-01-01 RX ORDER — POLYETHYLENE GLYCOL 3350 17 G/17G
17 POWDER, FOR SOLUTION ORAL DAILY
Qty: 30 EACH | Refills: 0 | Status: SHIPPED | OUTPATIENT
Start: 2020-01-01 | End: 2021-01-01 | Stop reason: HOSPADM

## 2020-01-01 RX ORDER — LIDOCAINE HYDROCHLORIDE 10 MG/ML
INJECTION, SOLUTION EPIDURAL; INFILTRATION; INTRACAUDAL; PERINEURAL AS NEEDED
Status: DISCONTINUED | OUTPATIENT
Start: 2020-01-01 | End: 2020-01-01

## 2020-01-01 RX ORDER — CANDESARTAN 8 MG/1
8 TABLET ORAL DAILY
Status: DISCONTINUED | OUTPATIENT
Start: 2020-01-01 | End: 2020-01-01 | Stop reason: HOSPADM

## 2020-01-01 RX ORDER — EPHEDRINE SULFATE 50 MG/ML
INJECTION INTRAVENOUS AS NEEDED
Status: DISCONTINUED | OUTPATIENT
Start: 2020-01-01 | End: 2020-01-01

## 2020-01-01 RX ORDER — PROPOFOL 10 MG/ML
INJECTION, EMULSION INTRAVENOUS AS NEEDED
Status: DISCONTINUED | OUTPATIENT
Start: 2020-01-01 | End: 2020-01-01

## 2020-01-01 RX ORDER — HEPARIN SODIUM 5000 [USP'U]/ML
5000 INJECTION, SOLUTION INTRAVENOUS; SUBCUTANEOUS EVERY 12 HOURS SCHEDULED
Status: DISCONTINUED | OUTPATIENT
Start: 2020-01-01 | End: 2020-01-01

## 2020-01-01 RX ORDER — LEVOTHYROXINE SODIUM 0.05 MG/1
50 TABLET ORAL
Status: DISCONTINUED | OUTPATIENT
Start: 2020-01-01 | End: 2020-01-01 | Stop reason: HOSPADM

## 2020-01-01 RX ADMIN — PROPOFOL 40 MG: 10 INJECTION, EMULSION INTRAVENOUS at 14:47

## 2020-01-01 RX ADMIN — PANTOPRAZOLE SODIUM 40 MG: 40 INJECTION, POWDER, FOR SOLUTION INTRAVENOUS at 08:43

## 2020-01-01 RX ADMIN — HYDROCORTISONE 2.5%: 25 CREAM TOPICAL at 08:29

## 2020-01-01 RX ADMIN — HYDROCORTISONE 2.5%: 25 CREAM TOPICAL at 08:43

## 2020-01-01 RX ADMIN — HYDROCORTISONE 2.5%: 25 CREAM TOPICAL at 18:35

## 2020-01-01 RX ADMIN — ASPIRIN 81 MG: 81 TABLET ORAL at 08:28

## 2020-01-01 RX ADMIN — PROPOFOL 30 MG: 10 INJECTION, EMULSION INTRAVENOUS at 14:48

## 2020-01-01 RX ADMIN — PANTOPRAZOLE SODIUM 40 MG: 40 INJECTION, POWDER, FOR SOLUTION INTRAVENOUS at 08:28

## 2020-01-01 RX ADMIN — CARBIDOPA, LEVODOPA AND ENTACAPONE 1 TABLET: 75; 200; 18.75 TABLET, FILM COATED ORAL at 08:29

## 2020-01-01 RX ADMIN — ROTIGOTINE 1 PATCH: 2 PATCH, EXTENDED RELEASE TRANSDERMAL at 21:00

## 2020-01-01 RX ADMIN — POLYETHYLENE GLYCOL 3350 17 G: 17 POWDER, FOR SOLUTION ORAL at 13:32

## 2020-01-01 RX ADMIN — LIDOCAINE HYDROCHLORIDE 50 MG: 10 INJECTION, SOLUTION EPIDURAL; INFILTRATION; INTRACAUDAL; PERINEURAL at 14:47

## 2020-01-01 RX ADMIN — SODIUM CHLORIDE 75 ML/HR: 0.9 INJECTION, SOLUTION INTRAVENOUS at 00:03

## 2020-01-01 RX ADMIN — CARBIDOPA, LEVODOPA AND ENTACAPONE 1 TABLET: 75; 200; 18.75 TABLET, FILM COATED ORAL at 18:35

## 2020-01-01 RX ADMIN — PANTOPRAZOLE SODIUM 40 MG: 40 INJECTION, POWDER, FOR SOLUTION INTRAVENOUS at 21:02

## 2020-01-01 RX ADMIN — SODIUM CHLORIDE 75 ML/HR: 0.9 INJECTION, SOLUTION INTRAVENOUS at 09:38

## 2020-01-01 RX ADMIN — CARBIDOPA, LEVODOPA AND ENTACAPONE 1 TABLET: 75; 200; 18.75 TABLET, FILM COATED ORAL at 19:00

## 2020-01-01 RX ADMIN — SODIUM CHLORIDE 75 ML/HR: 0.9 INJECTION, SOLUTION INTRAVENOUS at 18:00

## 2020-01-01 RX ADMIN — HYDROCORTISONE 2.5%: 25 CREAM TOPICAL at 20:21

## 2020-01-01 RX ADMIN — LEVOTHYROXINE SODIUM 50 MCG: 50 TABLET ORAL at 05:25

## 2020-01-01 RX ADMIN — HEPARIN SODIUM 5000 UNITS: 5000 INJECTION INTRAVENOUS; SUBCUTANEOUS at 21:38

## 2020-01-01 RX ADMIN — ROTIGOTINE 1 PATCH: 2 PATCH, EXTENDED RELEASE TRANSDERMAL at 21:02

## 2020-01-01 RX ADMIN — PNEUMOCOCCAL VACCINE POLYVALENT 0.5 ML
25; 25; 25; 25; 25; 25; 25; 25; 25; 25; 25; 25; 25; 25; 25; 25; 25; 25; 25; 25; 25; 25; 25 INJECTION, SOLUTION INTRAMUSCULAR; SUBCUTANEOUS at 18:22

## 2020-01-01 RX ADMIN — ACETAMINOPHEN 650 MG: 325 TABLET, FILM COATED ORAL at 08:43

## 2020-01-01 RX ADMIN — CANDESARTAN 8 MG: 8 TABLET ORAL at 09:16

## 2020-01-01 RX ADMIN — ASPIRIN 81 MG: 81 TABLET ORAL at 09:14

## 2020-01-01 RX ADMIN — CARBIDOPA, LEVODOPA AND ENTACAPONE 1 TABLET: 75; 200; 18.75 TABLET, FILM COATED ORAL at 08:43

## 2020-01-01 RX ADMIN — HYDROCORTISONE 2.5% 1 APPLICATION: 25 CREAM TOPICAL at 18:11

## 2020-01-01 RX ADMIN — EPHEDRINE SULFATE 10 MG: 50 INJECTION INTRAVENOUS at 14:56

## 2020-01-01 RX ADMIN — CANDESARTAN 8 MG: 8 TABLET ORAL at 08:29

## 2020-01-01 RX ADMIN — LEVOTHYROXINE SODIUM 50 MCG: 50 TABLET ORAL at 05:54

## 2020-01-01 RX ADMIN — CARBIDOPA, LEVODOPA AND ENTACAPONE 1 TABLET: 75; 200; 18.75 TABLET, FILM COATED ORAL at 21:38

## 2020-01-01 RX ADMIN — ASPIRIN 81 MG: 81 TABLET ORAL at 08:43

## 2020-01-01 RX ADMIN — MEMANTINE HYDROCHLORIDE 14 MG: 14 CAPSULE, EXTENDED RELEASE ORAL at 12:21

## 2020-01-01 RX ADMIN — ASPIRIN 81 MG: 81 TABLET ORAL at 18:21

## 2020-01-01 RX ADMIN — PANTOPRAZOLE SODIUM 40 MG: 40 INJECTION, POWDER, FOR SOLUTION INTRAVENOUS at 07:10

## 2020-01-01 RX ADMIN — HYDROCORTISONE 2.5%: 25 CREAM TOPICAL at 13:32

## 2020-01-01 RX ADMIN — LEVOTHYROXINE SODIUM 50 MCG: 50 TABLET ORAL at 06:47

## 2020-01-01 RX ADMIN — MEMANTINE HYDROCHLORIDE 14 MG: 14 CAPSULE, EXTENDED RELEASE ORAL at 18:35

## 2020-01-01 RX ADMIN — SODIUM CHLORIDE 1000 ML: 0.9 INJECTION, SOLUTION INTRAVENOUS at 09:44

## 2020-01-01 RX ADMIN — POLYETHYLENE GLYCOL 3350 17 G: 17 POWDER, FOR SOLUTION ORAL at 08:43

## 2020-01-01 RX ADMIN — CANDESARTAN 8 MG: 8 TABLET ORAL at 08:44

## 2020-01-01 RX ADMIN — CARBIDOPA, LEVODOPA AND ENTACAPONE 1 TABLET: 75; 200; 18.75 TABLET, FILM COATED ORAL at 09:16

## 2020-01-01 RX ADMIN — INFLUENZA A VIRUS A/MICHIGAN/45/2015 X-275 (H1N1) ANTIGEN (FORMALDEHYDE INACTIVATED), INFLUENZA A VIRUS A/SINGAPORE/INFIMH-16-0019/2016 IVR-186 (H3N2) ANTIGEN (FORMALDEHYDE INACTIVATED), INFLUENZA B VIRUS B/PHUKET/3073/2013 ANTIGEN (FORMALDEHYDE INACTIVATED), AND INFLUENZA B VIRUS B/MARYLAND/15/2016 BX-69A ANTIGEN (FORMALDEHYDE INACTIVATED) 0.7 ML: 60; 60; 60; 60 INJECTION, SUSPENSION INTRAMUSCULAR at 18:21

## 2020-01-01 RX ADMIN — SODIUM CHLORIDE 50 ML/HR: 0.45 INJECTION, SOLUTION INTRAVENOUS at 13:48

## 2020-01-01 RX ADMIN — POLYETHYLENE GLYCOL 3350 17 G: 17 POWDER, FOR SOLUTION ORAL at 08:29

## 2020-01-01 RX ADMIN — PANTOPRAZOLE SODIUM 40 MG: 40 INJECTION, POWDER, FOR SOLUTION INTRAVENOUS at 21:08

## 2020-10-21 PROBLEM — G20 PARKINSON'S DISEASE (HCC): Status: ACTIVE | Noted: 2020-01-01

## 2020-10-21 PROBLEM — R19.7 DIARRHEA: Status: ACTIVE | Noted: 2020-01-01

## 2020-10-21 PROBLEM — M81.0 OSTEOPOROSIS: Status: ACTIVE | Noted: 2020-01-01

## 2020-10-21 PROBLEM — E03.9 HYPOTHYROID: Status: ACTIVE | Noted: 2020-01-01

## 2020-10-21 PROBLEM — R26.2 AMBULATORY DYSFUNCTION: Status: ACTIVE | Noted: 2020-01-01

## 2020-10-21 PROBLEM — R41.3 MEMORY DEFICIT: Status: ACTIVE | Noted: 2020-01-01

## 2020-10-21 PROBLEM — N17.9 AKI (ACUTE KIDNEY INJURY) (HCC): Status: ACTIVE | Noted: 2020-01-01

## 2020-10-21 PROBLEM — E78.5 HLD (HYPERLIPIDEMIA): Status: ACTIVE | Noted: 2020-01-01

## 2020-10-22 PROBLEM — K92.1 HEMATOCHEZIA: Status: ACTIVE | Noted: 2020-01-01

## 2020-10-24 PROBLEM — E43 SEVERE PROTEIN-CALORIE MALNUTRITION (HCC): Status: ACTIVE | Noted: 2020-01-01

## 2021-01-01 ENCOUNTER — HOSPITAL ENCOUNTER (INPATIENT)
Facility: HOSPITAL | Age: 86
LOS: 11 days | DRG: 177 | End: 2021-08-21
Attending: EMERGENCY MEDICINE | Admitting: FAMILY MEDICINE
Payer: MEDICARE

## 2021-01-01 ENCOUNTER — APPOINTMENT (EMERGENCY)
Dept: RADIOLOGY | Facility: HOSPITAL | Age: 86
DRG: 177 | End: 2021-01-01
Payer: MEDICARE

## 2021-01-01 VITALS
SYSTOLIC BLOOD PRESSURE: 127 MMHG | OXYGEN SATURATION: 97 % | DIASTOLIC BLOOD PRESSURE: 55 MMHG | RESPIRATION RATE: 32 BRPM | HEART RATE: 75 BPM | HEIGHT: 59 IN | TEMPERATURE: 98.1 F | WEIGHT: 73.63 LBS | BODY MASS INDEX: 14.84 KG/M2

## 2021-01-01 DIAGNOSIS — J12.82 PNEUMONIA DUE TO COVID-19 VIRUS: Primary | ICD-10-CM

## 2021-01-01 DIAGNOSIS — U07.1 COVID-19: ICD-10-CM

## 2021-01-01 DIAGNOSIS — G20 PARKINSON'S DISEASE (HCC): ICD-10-CM

## 2021-01-01 DIAGNOSIS — Z23 ENCOUNTER FOR IMMUNIZATION: ICD-10-CM

## 2021-01-01 DIAGNOSIS — E43 SEVERE PROTEIN-CALORIE MALNUTRITION (HCC): ICD-10-CM

## 2021-01-01 DIAGNOSIS — R41.3 MEMORY DEFICIT: ICD-10-CM

## 2021-01-01 DIAGNOSIS — U07.1 PNEUMONIA DUE TO COVID-19 VIRUS: Primary | ICD-10-CM

## 2021-01-01 DIAGNOSIS — R09.02 HYPOXIA: ICD-10-CM

## 2021-01-01 DIAGNOSIS — E87.0 HYPERNATREMIA: ICD-10-CM

## 2021-01-01 DIAGNOSIS — R79.89 POSITIVE D DIMER: ICD-10-CM

## 2021-01-01 DIAGNOSIS — R06.02 SOB (SHORTNESS OF BREATH): ICD-10-CM

## 2021-01-01 LAB
ALBUMIN SERPL BCP-MCNC: 1.5 G/DL (ref 3.5–5)
ALBUMIN SERPL BCP-MCNC: 1.5 G/DL (ref 3.5–5)
ALBUMIN SERPL BCP-MCNC: 2.7 G/DL (ref 3.5–5)
ALP SERPL-CCNC: 72 U/L (ref 46–116)
ALP SERPL-CCNC: 77 U/L (ref 46–116)
ALP SERPL-CCNC: 84 U/L (ref 46–116)
ALT SERPL W P-5'-P-CCNC: 25 U/L (ref 12–78)
ALT SERPL W P-5'-P-CCNC: 30 U/L (ref 12–78)
ALT SERPL W P-5'-P-CCNC: 32 U/L (ref 12–78)
ANION GAP SERPL CALCULATED.3IONS-SCNC: 12 MMOL/L (ref 4–13)
ANION GAP SERPL CALCULATED.3IONS-SCNC: 13 MMOL/L (ref 4–13)
ANION GAP SERPL CALCULATED.3IONS-SCNC: 13 MMOL/L (ref 4–13)
ANION GAP SERPL CALCULATED.3IONS-SCNC: 6 MMOL/L (ref 4–13)
ANION GAP SERPL CALCULATED.3IONS-SCNC: 7 MMOL/L (ref 4–13)
ANION GAP SERPL CALCULATED.3IONS-SCNC: 8 MMOL/L (ref 4–13)
ANION GAP SERPL CALCULATED.3IONS-SCNC: 9 MMOL/L (ref 4–13)
ANION GAP SERPL CALCULATED.3IONS-SCNC: 9 MMOL/L (ref 4–13)
AST SERPL W P-5'-P-CCNC: 42 U/L (ref 5–45)
AST SERPL W P-5'-P-CCNC: 64 U/L (ref 5–45)
AST SERPL W P-5'-P-CCNC: 71 U/L (ref 5–45)
BACTERIA BLD CULT: ABNORMAL
BACTERIA BLD CULT: ABNORMAL
BACTERIA BLD CULT: NORMAL
BACTERIA BLD CULT: NORMAL
BACTERIA UR QL AUTO: ABNORMAL /HPF
BASOPHILS # BLD AUTO: 0 THOUSANDS/ΜL (ref 0–0.1)
BASOPHILS # BLD AUTO: 0.01 THOUSANDS/ΜL (ref 0–0.1)
BASOPHILS # BLD MANUAL: 0 THOUSAND/UL (ref 0–0.1)
BASOPHILS NFR BLD AUTO: 0 % (ref 0–1)
BASOPHILS NFR BLD AUTO: 0 % (ref 0–1)
BASOPHILS NFR MAR MANUAL: 0 % (ref 0–1)
BILIRUB SERPL-MCNC: 0.23 MG/DL (ref 0.2–1)
BILIRUB SERPL-MCNC: 0.32 MG/DL (ref 0.2–1)
BILIRUB SERPL-MCNC: 0.53 MG/DL (ref 0.2–1)
BILIRUB UR QL STRIP: NEGATIVE
BUN SERPL-MCNC: 10 MG/DL (ref 5–25)
BUN SERPL-MCNC: 13 MG/DL (ref 5–25)
BUN SERPL-MCNC: 22 MG/DL (ref 5–25)
BUN SERPL-MCNC: 27 MG/DL (ref 5–25)
BUN SERPL-MCNC: 37 MG/DL (ref 5–25)
BUN SERPL-MCNC: 44 MG/DL (ref 5–25)
BUN SERPL-MCNC: 47 MG/DL (ref 5–25)
BUN SERPL-MCNC: 48 MG/DL (ref 5–25)
CA-I BLD-SCNC: 1.05 MMOL/L (ref 1.12–1.32)
CALCIUM ALBUM COR SERPL-MCNC: 9.4 MG/DL (ref 8.3–10.1)
CALCIUM ALBUM COR SERPL-MCNC: 9.6 MG/DL (ref 8.3–10.1)
CALCIUM ALBUM COR SERPL-MCNC: 9.8 MG/DL (ref 8.3–10.1)
CALCIUM SERPL-MCNC: 7.4 MG/DL (ref 8.3–10.1)
CALCIUM SERPL-MCNC: 7.6 MG/DL (ref 8.3–10.1)
CALCIUM SERPL-MCNC: 7.6 MG/DL (ref 8.3–10.1)
CALCIUM SERPL-MCNC: 7.8 MG/DL (ref 8.3–10.1)
CALCIUM SERPL-MCNC: 7.8 MG/DL (ref 8.3–10.1)
CALCIUM SERPL-MCNC: 8 MG/DL (ref 8.3–10.1)
CALCIUM SERPL-MCNC: 8.1 MG/DL (ref 8.3–10.1)
CALCIUM SERPL-MCNC: 8.6 MG/DL (ref 8.3–10.1)
CHLORIDE SERPL-SCNC: 102 MMOL/L (ref 100–108)
CHLORIDE SERPL-SCNC: 104 MMOL/L (ref 100–108)
CHLORIDE SERPL-SCNC: 107 MMOL/L (ref 100–108)
CHLORIDE SERPL-SCNC: 109 MMOL/L (ref 100–108)
CHLORIDE SERPL-SCNC: 117 MMOL/L (ref 100–108)
CHLORIDE SERPL-SCNC: 118 MMOL/L (ref 100–108)
CHLORIDE SERPL-SCNC: 121 MMOL/L (ref 100–108)
CHLORIDE SERPL-SCNC: 122 MMOL/L (ref 100–108)
CK MB SERPL-MCNC: 1.9 % (ref 0–2.5)
CK MB SERPL-MCNC: 2.8 NG/ML (ref 0–5)
CK SERPL-CCNC: 144 U/L (ref 26–192)
CLARITY UR: ABNORMAL
CO2 SERPL-SCNC: 24 MMOL/L (ref 21–32)
CO2 SERPL-SCNC: 25 MMOL/L (ref 21–32)
CO2 SERPL-SCNC: 26 MMOL/L (ref 21–32)
CO2 SERPL-SCNC: 27 MMOL/L (ref 21–32)
CO2 SERPL-SCNC: 27 MMOL/L (ref 21–32)
CO2 SERPL-SCNC: 28 MMOL/L (ref 21–32)
COLOR UR: YELLOW
CREAT SERPL-MCNC: 0.62 MG/DL (ref 0.6–1.3)
CREAT SERPL-MCNC: 0.76 MG/DL (ref 0.6–1.3)
CREAT SERPL-MCNC: 0.94 MG/DL (ref 0.6–1.3)
CREAT SERPL-MCNC: 1.15 MG/DL (ref 0.6–1.3)
CREAT SERPL-MCNC: 1.18 MG/DL (ref 0.6–1.3)
CREAT SERPL-MCNC: 1.23 MG/DL (ref 0.6–1.3)
CREAT SERPL-MCNC: 1.27 MG/DL (ref 0.6–1.3)
CREAT SERPL-MCNC: 1.56 MG/DL (ref 0.6–1.3)
CRP SERPL QL: 159.8 MG/L
CRP SERPL QL: 159.9 MG/L
CRP SERPL QL: 167.8 MG/L
CRP SERPL QL: 177.8 MG/L
CRP SERPL QL: 190.1 MG/L
D DIMER PPP FEU-MCNC: 0.94 UG/ML FEU
D DIMER PPP FEU-MCNC: 1.12 UG/ML FEU
EOSINOPHIL # BLD AUTO: 0 THOUSAND/ΜL (ref 0–0.61)
EOSINOPHIL # BLD AUTO: 0 THOUSAND/ΜL (ref 0–0.61)
EOSINOPHIL # BLD MANUAL: 0 THOUSAND/UL (ref 0–0.4)
EOSINOPHIL NFR BLD AUTO: 0 % (ref 0–6)
EOSINOPHIL NFR BLD AUTO: 0 % (ref 0–6)
EOSINOPHIL NFR BLD MANUAL: 0 % (ref 0–6)
ERYTHROCYTE [DISTWIDTH] IN BLOOD BY AUTOMATED COUNT: 13.5 % (ref 11.6–15.1)
ERYTHROCYTE [DISTWIDTH] IN BLOOD BY AUTOMATED COUNT: 13.6 % (ref 11.6–15.1)
ERYTHROCYTE [DISTWIDTH] IN BLOOD BY AUTOMATED COUNT: 13.7 % (ref 11.6–15.1)
ERYTHROCYTE [DISTWIDTH] IN BLOOD BY AUTOMATED COUNT: 14.4 % (ref 11.6–15.1)
ERYTHROCYTE [DISTWIDTH] IN BLOOD BY AUTOMATED COUNT: 14.6 % (ref 11.6–15.1)
ERYTHROCYTE [DISTWIDTH] IN BLOOD BY AUTOMATED COUNT: 14.7 % (ref 11.6–15.1)
ERYTHROCYTE [DISTWIDTH] IN BLOOD BY AUTOMATED COUNT: 14.7 % (ref 11.6–15.1)
ERYTHROCYTE [SEDIMENTATION RATE] IN BLOOD: 79 MM/HOUR (ref 0–29)
FERRITIN SERPL-MCNC: 166 NG/ML (ref 8–388)
FINE GRAN CASTS URNS QL MICRO: ABNORMAL /LPF
GFR SERPL CREATININE-BSD FRML MDRD: 30 ML/MIN/1.73SQ M
GFR SERPL CREATININE-BSD FRML MDRD: 38 ML/MIN/1.73SQ M
GFR SERPL CREATININE-BSD FRML MDRD: 40 ML/MIN/1.73SQ M
GFR SERPL CREATININE-BSD FRML MDRD: 42 ML/MIN/1.73SQ M
GFR SERPL CREATININE-BSD FRML MDRD: 43 ML/MIN/1.73SQ M
GFR SERPL CREATININE-BSD FRML MDRD: 55 ML/MIN/1.73SQ M
GFR SERPL CREATININE-BSD FRML MDRD: 71 ML/MIN/1.73SQ M
GFR SERPL CREATININE-BSD FRML MDRD: 82 ML/MIN/1.73SQ M
GLUCOSE SERPL-MCNC: 100 MG/DL (ref 65–140)
GLUCOSE SERPL-MCNC: 104 MG/DL (ref 65–140)
GLUCOSE SERPL-MCNC: 107 MG/DL (ref 65–140)
GLUCOSE SERPL-MCNC: 112 MG/DL (ref 65–140)
GLUCOSE SERPL-MCNC: 112 MG/DL (ref 65–140)
GLUCOSE SERPL-MCNC: 113 MG/DL (ref 65–140)
GLUCOSE SERPL-MCNC: 113 MG/DL (ref 65–140)
GLUCOSE SERPL-MCNC: 114 MG/DL (ref 65–140)
GLUCOSE SERPL-MCNC: 121 MG/DL (ref 65–140)
GLUCOSE SERPL-MCNC: 124 MG/DL (ref 65–140)
GLUCOSE SERPL-MCNC: 126 MG/DL (ref 65–140)
GLUCOSE SERPL-MCNC: 128 MG/DL (ref 65–140)
GLUCOSE SERPL-MCNC: 132 MG/DL (ref 65–140)
GLUCOSE SERPL-MCNC: 132 MG/DL (ref 65–140)
GLUCOSE SERPL-MCNC: 134 MG/DL (ref 65–140)
GLUCOSE SERPL-MCNC: 135 MG/DL (ref 65–140)
GLUCOSE SERPL-MCNC: 136 MG/DL (ref 65–140)
GLUCOSE SERPL-MCNC: 136 MG/DL (ref 65–140)
GLUCOSE SERPL-MCNC: 138 MG/DL (ref 65–140)
GLUCOSE SERPL-MCNC: 138 MG/DL (ref 65–140)
GLUCOSE SERPL-MCNC: 141 MG/DL (ref 65–140)
GLUCOSE SERPL-MCNC: 143 MG/DL (ref 65–140)
GLUCOSE SERPL-MCNC: 147 MG/DL (ref 65–140)
GLUCOSE SERPL-MCNC: 148 MG/DL (ref 65–140)
GLUCOSE SERPL-MCNC: 148 MG/DL (ref 65–140)
GLUCOSE SERPL-MCNC: 151 MG/DL (ref 65–140)
GLUCOSE SERPL-MCNC: 152 MG/DL (ref 65–140)
GLUCOSE SERPL-MCNC: 154 MG/DL (ref 65–140)
GLUCOSE SERPL-MCNC: 156 MG/DL (ref 65–140)
GLUCOSE SERPL-MCNC: 157 MG/DL (ref 65–140)
GLUCOSE SERPL-MCNC: 158 MG/DL (ref 65–140)
GLUCOSE SERPL-MCNC: 163 MG/DL (ref 65–140)
GLUCOSE SERPL-MCNC: 164 MG/DL (ref 65–140)
GLUCOSE SERPL-MCNC: 164 MG/DL (ref 65–140)
GLUCOSE SERPL-MCNC: 167 MG/DL (ref 65–140)
GLUCOSE SERPL-MCNC: 177 MG/DL (ref 65–140)
GLUCOSE SERPL-MCNC: 53 MG/DL (ref 65–140)
GLUCOSE SERPL-MCNC: 58 MG/DL (ref 65–140)
GLUCOSE SERPL-MCNC: 61 MG/DL (ref 65–140)
GLUCOSE SERPL-MCNC: 66 MG/DL (ref 65–140)
GLUCOSE SERPL-MCNC: 79 MG/DL (ref 65–140)
GLUCOSE SERPL-MCNC: 79 MG/DL (ref 65–140)
GLUCOSE SERPL-MCNC: 81 MG/DL (ref 65–140)
GLUCOSE SERPL-MCNC: 89 MG/DL (ref 65–140)
GLUCOSE SERPL-MCNC: 90 MG/DL (ref 65–140)
GLUCOSE SERPL-MCNC: 91 MG/DL (ref 65–140)
GLUCOSE SERPL-MCNC: 92 MG/DL (ref 65–140)
GLUCOSE SERPL-MCNC: 93 MG/DL (ref 65–140)
GLUCOSE SERPL-MCNC: 93 MG/DL (ref 65–140)
GLUCOSE SERPL-MCNC: 96 MG/DL (ref 65–140)
GLUCOSE SERPL-MCNC: 99 MG/DL (ref 65–140)
GLUCOSE UR STRIP-MCNC: NEGATIVE MG/DL
GRAM STN SPEC: ABNORMAL
GRAM STN SPEC: ABNORMAL
HCT VFR BLD AUTO: 31.6 % (ref 34.8–46.1)
HCT VFR BLD AUTO: 34 % (ref 34.8–46.1)
HCT VFR BLD AUTO: 34.3 % (ref 34.8–46.1)
HCT VFR BLD AUTO: 34.5 % (ref 34.8–46.1)
HCT VFR BLD AUTO: 34.8 % (ref 34.8–46.1)
HCT VFR BLD AUTO: 37.2 % (ref 34.8–46.1)
HCT VFR BLD AUTO: 41.1 % (ref 34.8–46.1)
HGB BLD-MCNC: 10.1 G/DL (ref 11.5–15.4)
HGB BLD-MCNC: 10.4 G/DL (ref 11.5–15.4)
HGB BLD-MCNC: 10.5 G/DL (ref 11.5–15.4)
HGB BLD-MCNC: 10.7 G/DL (ref 11.5–15.4)
HGB BLD-MCNC: 10.9 G/DL (ref 11.5–15.4)
HGB BLD-MCNC: 11 G/DL (ref 11.5–15.4)
HGB BLD-MCNC: 12.4 G/DL (ref 11.5–15.4)
HGB UR QL STRIP.AUTO: NEGATIVE
HYALINE CASTS #/AREA URNS LPF: ABNORMAL /LPF
IL6 SERPL-MCNC: 127.6 PG/ML (ref 0–13)
IMM GRANULOCYTES # BLD AUTO: 0.02 THOUSAND/UL (ref 0–0.2)
IMM GRANULOCYTES # BLD AUTO: 0.05 THOUSAND/UL (ref 0–0.2)
IMM GRANULOCYTES NFR BLD AUTO: 0 % (ref 0–2)
IMM GRANULOCYTES NFR BLD AUTO: 1 % (ref 0–2)
KETONES UR STRIP-MCNC: NEGATIVE MG/DL
LDH SERPL-CCNC: 306 U/L (ref 81–234)
LEUKOCYTE ESTERASE UR QL STRIP: NEGATIVE
LYMPHOCYTES # BLD AUTO: 0.33 THOUSANDS/ΜL (ref 0.6–4.47)
LYMPHOCYTES # BLD AUTO: 0.36 THOUSAND/UL (ref 0.6–4.47)
LYMPHOCYTES # BLD AUTO: 0.82 THOUSANDS/ΜL (ref 0.6–4.47)
LYMPHOCYTES # BLD AUTO: 10 % (ref 14–44)
LYMPHOCYTES NFR BLD AUTO: 13 % (ref 14–44)
LYMPHOCYTES NFR BLD AUTO: 5 % (ref 14–44)
MAGNESIUM SERPL-MCNC: 1.7 MG/DL (ref 1.6–2.6)
MAGNESIUM SERPL-MCNC: 2 MG/DL (ref 1.6–2.6)
MCH RBC QN AUTO: 29.6 PG (ref 26.8–34.3)
MCH RBC QN AUTO: 29.6 PG (ref 26.8–34.3)
MCH RBC QN AUTO: 29.7 PG (ref 26.8–34.3)
MCH RBC QN AUTO: 29.7 PG (ref 26.8–34.3)
MCH RBC QN AUTO: 29.8 PG (ref 26.8–34.3)
MCH RBC QN AUTO: 29.9 PG (ref 26.8–34.3)
MCH RBC QN AUTO: 29.9 PG (ref 26.8–34.3)
MCHC RBC AUTO-ENTMCNC: 29.6 G/DL (ref 31.4–37.4)
MCHC RBC AUTO-ENTMCNC: 29.9 G/DL (ref 31.4–37.4)
MCHC RBC AUTO-ENTMCNC: 30.2 G/DL (ref 31.4–37.4)
MCHC RBC AUTO-ENTMCNC: 30.4 G/DL (ref 31.4–37.4)
MCHC RBC AUTO-ENTMCNC: 31.5 G/DL (ref 31.4–37.4)
MCHC RBC AUTO-ENTMCNC: 31.8 G/DL (ref 31.4–37.4)
MCHC RBC AUTO-ENTMCNC: 32 G/DL (ref 31.4–37.4)
MCV RBC AUTO: 101 FL (ref 82–98)
MCV RBC AUTO: 93 FL (ref 82–98)
MCV RBC AUTO: 94 FL (ref 82–98)
MCV RBC AUTO: 94 FL (ref 82–98)
MCV RBC AUTO: 98 FL (ref 82–98)
MCV RBC AUTO: 99 FL (ref 82–98)
MCV RBC AUTO: 99 FL (ref 82–98)
MONOCYTES # BLD AUTO: 0.04 THOUSAND/UL (ref 0–1.22)
MONOCYTES # BLD AUTO: 0.14 THOUSAND/ΜL (ref 0.17–1.22)
MONOCYTES # BLD AUTO: 0.18 THOUSAND/ΜL (ref 0.17–1.22)
MONOCYTES NFR BLD AUTO: 2 % (ref 4–12)
MONOCYTES NFR BLD AUTO: 3 % (ref 4–12)
MONOCYTES NFR BLD: 1 % (ref 4–12)
NEUTROPHILS # BLD AUTO: 5.13 THOUSANDS/ΜL (ref 1.85–7.62)
NEUTROPHILS # BLD AUTO: 6.31 THOUSANDS/ΜL (ref 1.85–7.62)
NEUTROPHILS # BLD MANUAL: 3.16 THOUSAND/UL (ref 1.85–7.62)
NEUTS BAND NFR BLD MANUAL: 3 % (ref 0–8)
NEUTS SEG NFR BLD AUTO: 84 % (ref 43–75)
NEUTS SEG NFR BLD AUTO: 85 % (ref 43–75)
NEUTS SEG NFR BLD AUTO: 92 % (ref 43–75)
NITRITE UR QL STRIP: NEGATIVE
NON-SQ EPI CELLS URNS QL MICRO: ABNORMAL /HPF
NRBC BLD AUTO-RTO: 0 /100 WBCS
NT-PROBNP SERPL-MCNC: 2460 PG/ML
PH UR STRIP.AUTO: 5 [PH]
PHOSPHATE SERPL-MCNC: 2.2 MG/DL (ref 2.3–4.1)
PHOSPHATE SERPL-MCNC: 2.3 MG/DL (ref 2.3–4.1)
PLATELET # BLD AUTO: 125 THOUSANDS/UL (ref 149–390)
PLATELET # BLD AUTO: 132 THOUSANDS/UL (ref 149–390)
PLATELET # BLD AUTO: 141 THOUSANDS/UL (ref 149–390)
PLATELET # BLD AUTO: 144 THOUSANDS/UL (ref 149–390)
PLATELET # BLD AUTO: 145 THOUSANDS/UL (ref 149–390)
PLATELET # BLD AUTO: 153 THOUSANDS/UL (ref 149–390)
PLATELET # BLD AUTO: 160 THOUSANDS/UL (ref 149–390)
PLATELET BLD QL SMEAR: ABNORMAL
PMV BLD AUTO: 10.1 FL (ref 8.9–12.7)
PMV BLD AUTO: 10.2 FL (ref 8.9–12.7)
PMV BLD AUTO: 10.5 FL (ref 8.9–12.7)
PMV BLD AUTO: 10.6 FL (ref 8.9–12.7)
PMV BLD AUTO: 10.6 FL (ref 8.9–12.7)
PMV BLD AUTO: 10.7 FL (ref 8.9–12.7)
PMV BLD AUTO: 9.7 FL (ref 8.9–12.7)
POTASSIUM SERPL-SCNC: 2.9 MMOL/L (ref 3.5–5.3)
POTASSIUM SERPL-SCNC: 3 MMOL/L (ref 3.5–5.3)
POTASSIUM SERPL-SCNC: 3.3 MMOL/L (ref 3.5–5.3)
POTASSIUM SERPL-SCNC: 3.3 MMOL/L (ref 3.5–5.3)
POTASSIUM SERPL-SCNC: 3.5 MMOL/L (ref 3.5–5.3)
POTASSIUM SERPL-SCNC: 3.6 MMOL/L (ref 3.5–5.3)
POTASSIUM SERPL-SCNC: 3.7 MMOL/L (ref 3.5–5.3)
POTASSIUM SERPL-SCNC: 3.8 MMOL/L (ref 3.5–5.3)
POTASSIUM SERPL-SCNC: 3.8 MMOL/L (ref 3.5–5.3)
PROCALCITONIN SERPL-MCNC: 0.27 NG/ML
PROCALCITONIN SERPL-MCNC: 0.38 NG/ML
PROCALCITONIN SERPL-MCNC: 1.2 NG/ML
PROCALCITONIN SERPL-MCNC: 1.33 NG/ML
PROT SERPL-MCNC: 5.5 G/DL (ref 6.4–8.2)
PROT SERPL-MCNC: 5.9 G/DL (ref 6.4–8.2)
PROT SERPL-MCNC: 7.6 G/DL (ref 6.4–8.2)
PROT UR STRIP-MCNC: ABNORMAL MG/DL
RBC # BLD AUTO: 3.39 MILLION/UL (ref 3.81–5.12)
RBC # BLD AUTO: 3.5 MILLION/UL (ref 3.81–5.12)
RBC # BLD AUTO: 3.51 MILLION/UL (ref 3.81–5.12)
RBC # BLD AUTO: 3.62 MILLION/UL (ref 3.81–5.12)
RBC # BLD AUTO: 3.68 MILLION/UL (ref 3.81–5.12)
RBC # BLD AUTO: 3.68 MILLION/UL (ref 3.81–5.12)
RBC # BLD AUTO: 4.17 MILLION/UL (ref 3.81–5.12)
RBC #/AREA URNS AUTO: ABNORMAL /HPF
SARS-COV-2 RNA RESP QL NAA+PROBE: POSITIVE
SODIUM SERPL-SCNC: 136 MMOL/L (ref 136–145)
SODIUM SERPL-SCNC: 136 MMOL/L (ref 136–145)
SODIUM SERPL-SCNC: 137 MMOL/L (ref 136–145)
SODIUM SERPL-SCNC: 144 MMOL/L (ref 136–145)
SODIUM SERPL-SCNC: 153 MMOL/L (ref 136–145)
SODIUM SERPL-SCNC: 159 MMOL/L (ref 136–145)
SODIUM SERPL-SCNC: 160 MMOL/L (ref 136–145)
SODIUM SERPL-SCNC: 161 MMOL/L (ref 136–145)
SP GR UR STRIP.AUTO: 1.02 (ref 1–1.03)
TOTAL CELLS COUNTED SPEC: 100
TROPONIN I SERPL-MCNC: 0.13 NG/ML
TROPONIN I SERPL-MCNC: 0.16 NG/ML
TROPONIN I SERPL-MCNC: 0.19 NG/ML
TROPONIN I SERPL-MCNC: 0.2 NG/ML
TROPONIN I SERPL-MCNC: 0.25 NG/ML
UROBILINOGEN UR QL STRIP.AUTO: 0.2 E.U./DL
VARIANT LYMPHS # BLD AUTO: 1 %
WBC # BLD AUTO: 3.38 THOUSAND/UL (ref 4.31–10.16)
WBC # BLD AUTO: 3.59 THOUSAND/UL (ref 4.31–10.16)
WBC # BLD AUTO: 4.31 THOUSAND/UL (ref 4.31–10.16)
WBC # BLD AUTO: 5.58 THOUSAND/UL (ref 4.31–10.16)
WBC # BLD AUTO: 6.04 THOUSAND/UL (ref 4.31–10.16)
WBC # BLD AUTO: 6.14 THOUSAND/UL (ref 4.31–10.16)
WBC # BLD AUTO: 6.85 THOUSAND/UL (ref 4.31–10.16)
WBC #/AREA URNS AUTO: ABNORMAL /HPF

## 2021-01-01 PROCEDURE — 84145 PROCALCITONIN (PCT): CPT | Performed by: EMERGENCY MEDICINE

## 2021-01-01 PROCEDURE — 82948 REAGENT STRIP/BLOOD GLUCOSE: CPT

## 2021-01-01 PROCEDURE — 87186 SC STD MICRODIL/AGAR DIL: CPT | Performed by: EMERGENCY MEDICINE

## 2021-01-01 PROCEDURE — 85379 FIBRIN DEGRADATION QUANT: CPT | Performed by: EMERGENCY MEDICINE

## 2021-01-01 PROCEDURE — 99232 SBSQ HOSP IP/OBS MODERATE 35: CPT | Performed by: NURSE PRACTITIONER

## 2021-01-01 PROCEDURE — 84484 ASSAY OF TROPONIN QUANT: CPT | Performed by: PHYSICIAN ASSISTANT

## 2021-01-01 PROCEDURE — 82550 ASSAY OF CK (CPK): CPT | Performed by: EMERGENCY MEDICINE

## 2021-01-01 PROCEDURE — 86140 C-REACTIVE PROTEIN: CPT | Performed by: PHYSICIAN ASSISTANT

## 2021-01-01 PROCEDURE — 82330 ASSAY OF CALCIUM: CPT | Performed by: PHYSICIAN ASSISTANT

## 2021-01-01 PROCEDURE — 80048 BASIC METABOLIC PNL TOTAL CA: CPT | Performed by: PHYSICIAN ASSISTANT

## 2021-01-01 PROCEDURE — 99232 SBSQ HOSP IP/OBS MODERATE 35: CPT | Performed by: INTERNAL MEDICINE

## 2021-01-01 PROCEDURE — 85379 FIBRIN DEGRADATION QUANT: CPT | Performed by: PHYSICIAN ASSISTANT

## 2021-01-01 PROCEDURE — 87040 BLOOD CULTURE FOR BACTERIA: CPT | Performed by: PHYSICIAN ASSISTANT

## 2021-01-01 PROCEDURE — 86140 C-REACTIVE PROTEIN: CPT | Performed by: EMERGENCY MEDICINE

## 2021-01-01 PROCEDURE — 99232 SBSQ HOSP IP/OBS MODERATE 35: CPT | Performed by: STUDENT IN AN ORGANIZED HEALTH CARE EDUCATION/TRAINING PROGRAM

## 2021-01-01 PROCEDURE — 99284 EMERGENCY DEPT VISIT MOD MDM: CPT

## 2021-01-01 PROCEDURE — 80048 BASIC METABOLIC PNL TOTAL CA: CPT | Performed by: NURSE PRACTITIONER

## 2021-01-01 PROCEDURE — 36415 COLL VENOUS BLD VENIPUNCTURE: CPT | Performed by: EMERGENCY MEDICINE

## 2021-01-01 PROCEDURE — 85007 BL SMEAR W/DIFF WBC COUNT: CPT | Performed by: PHYSICIAN ASSISTANT

## 2021-01-01 PROCEDURE — 82553 CREATINE MB FRACTION: CPT | Performed by: EMERGENCY MEDICINE

## 2021-01-01 PROCEDURE — 84132 ASSAY OF SERUM POTASSIUM: CPT | Performed by: PHYSICIAN ASSISTANT

## 2021-01-01 PROCEDURE — 99223 1ST HOSP IP/OBS HIGH 75: CPT | Performed by: PHYSICIAN ASSISTANT

## 2021-01-01 PROCEDURE — 71045 X-RAY EXAM CHEST 1 VIEW: CPT

## 2021-01-01 PROCEDURE — 84100 ASSAY OF PHOSPHORUS: CPT | Performed by: PHYSICIAN ASSISTANT

## 2021-01-01 PROCEDURE — 85027 COMPLETE CBC AUTOMATED: CPT | Performed by: PHYSICIAN ASSISTANT

## 2021-01-01 PROCEDURE — 83615 LACTATE (LD) (LDH) ENZYME: CPT | Performed by: EMERGENCY MEDICINE

## 2021-01-01 PROCEDURE — 80053 COMPREHEN METABOLIC PANEL: CPT | Performed by: EMERGENCY MEDICINE

## 2021-01-01 PROCEDURE — 84484 ASSAY OF TROPONIN QUANT: CPT | Performed by: NURSE PRACTITIONER

## 2021-01-01 PROCEDURE — 99231 SBSQ HOSP IP/OBS SF/LOW 25: CPT | Performed by: NURSE PRACTITIONER

## 2021-01-01 PROCEDURE — 83520 IMMUNOASSAY QUANT NOS NONAB: CPT | Performed by: EMERGENCY MEDICINE

## 2021-01-01 PROCEDURE — 94664 DEMO&/EVAL PT USE INHALER: CPT

## 2021-01-01 PROCEDURE — 80053 COMPREHEN METABOLIC PANEL: CPT | Performed by: PHYSICIAN ASSISTANT

## 2021-01-01 PROCEDURE — 85025 COMPLETE CBC W/AUTO DIFF WBC: CPT | Performed by: EMERGENCY MEDICINE

## 2021-01-01 PROCEDURE — 94760 N-INVAS EAR/PLS OXIMETRY 1: CPT

## 2021-01-01 PROCEDURE — 82728 ASSAY OF FERRITIN: CPT | Performed by: EMERGENCY MEDICINE

## 2021-01-01 PROCEDURE — 85652 RBC SED RATE AUTOMATED: CPT | Performed by: EMERGENCY MEDICINE

## 2021-01-01 PROCEDURE — 83735 ASSAY OF MAGNESIUM: CPT | Performed by: PHYSICIAN ASSISTANT

## 2021-01-01 PROCEDURE — 84484 ASSAY OF TROPONIN QUANT: CPT | Performed by: INTERNAL MEDICINE

## 2021-01-01 PROCEDURE — 84484 ASSAY OF TROPONIN QUANT: CPT | Performed by: EMERGENCY MEDICINE

## 2021-01-01 PROCEDURE — 84145 PROCALCITONIN (PCT): CPT | Performed by: PHYSICIAN ASSISTANT

## 2021-01-01 PROCEDURE — 86140 C-REACTIVE PROTEIN: CPT | Performed by: NURSE PRACTITIONER

## 2021-01-01 PROCEDURE — U0005 INFEC AGEN DETEC AMPLI PROBE: HCPCS | Performed by: EMERGENCY MEDICINE

## 2021-01-01 PROCEDURE — 99232 SBSQ HOSP IP/OBS MODERATE 35: CPT | Performed by: PHYSICIAN ASSISTANT

## 2021-01-01 PROCEDURE — 83880 ASSAY OF NATRIURETIC PEPTIDE: CPT | Performed by: EMERGENCY MEDICINE

## 2021-01-01 PROCEDURE — 99239 HOSP IP/OBS DSCHRG MGMT >30: CPT | Performed by: NURSE PRACTITIONER

## 2021-01-01 PROCEDURE — 99233 SBSQ HOSP IP/OBS HIGH 50: CPT | Performed by: NURSE PRACTITIONER

## 2021-01-01 PROCEDURE — 99285 EMERGENCY DEPT VISIT HI MDM: CPT | Performed by: EMERGENCY MEDICINE

## 2021-01-01 PROCEDURE — 87040 BLOOD CULTURE FOR BACTERIA: CPT | Performed by: EMERGENCY MEDICINE

## 2021-01-01 PROCEDURE — 85025 COMPLETE CBC W/AUTO DIFF WBC: CPT | Performed by: NURSE PRACTITIONER

## 2021-01-01 PROCEDURE — 99221 1ST HOSP IP/OBS SF/LOW 40: CPT | Performed by: STUDENT IN AN ORGANIZED HEALTH CARE EDUCATION/TRAINING PROGRAM

## 2021-01-01 PROCEDURE — U0003 INFECTIOUS AGENT DETECTION BY NUCLEIC ACID (DNA OR RNA); SEVERE ACUTE RESPIRATORY SYNDROME CORONAVIRUS 2 (SARS-COV-2) (CORONAVIRUS DISEASE [COVID-19]), AMPLIFIED PROBE TECHNIQUE, MAKING USE OF HIGH THROUGHPUT TECHNOLOGIES AS DESCRIBED BY CMS-2020-01-R: HCPCS | Performed by: EMERGENCY MEDICINE

## 2021-01-01 PROCEDURE — 85027 COMPLETE CBC AUTOMATED: CPT | Performed by: NURSE PRACTITIONER

## 2021-01-01 PROCEDURE — 81001 URINALYSIS AUTO W/SCOPE: CPT | Performed by: EMERGENCY MEDICINE

## 2021-01-01 RX ORDER — MULTIVITAMIN/IRON/FOLIC ACID 18MG-0.4MG
1 TABLET ORAL DAILY
Status: DISCONTINUED | OUTPATIENT
Start: 2021-01-01 | End: 2021-01-01

## 2021-01-01 RX ORDER — MELATONIN
2000 DAILY
Status: DISCONTINUED | OUTPATIENT
Start: 2021-01-01 | End: 2021-01-01

## 2021-01-01 RX ORDER — SCOLOPAMINE TRANSDERMAL SYSTEM 1 MG/1
1 PATCH, EXTENDED RELEASE TRANSDERMAL
Status: DISCONTINUED | OUTPATIENT
Start: 2021-01-01 | End: 2021-01-01 | Stop reason: HOSPADM

## 2021-01-01 RX ORDER — DEXTROSE MONOHYDRATE 25 G/50ML
INJECTION, SOLUTION INTRAVENOUS
Status: COMPLETED
Start: 2021-01-01 | End: 2021-01-01

## 2021-01-01 RX ORDER — GLYCOPYRROLATE 0.2 MG/ML
0.1 INJECTION INTRAMUSCULAR; INTRAVENOUS EVERY 4 HOURS PRN
Status: DISCONTINUED | OUTPATIENT
Start: 2021-01-01 | End: 2021-01-01

## 2021-01-01 RX ORDER — ZINC SULFATE 50(220)MG
220 CAPSULE ORAL DAILY
Status: ACTIVE | OUTPATIENT
Start: 2021-01-01 | End: 2021-01-01

## 2021-01-01 RX ORDER — PANTOPRAZOLE SODIUM 40 MG/1
40 TABLET, DELAYED RELEASE ORAL
Status: DISCONTINUED | OUTPATIENT
Start: 2021-01-01 | End: 2021-01-01

## 2021-01-01 RX ORDER — MEMANTINE HYDROCHLORIDE 10 MG/1
10 TABLET ORAL 2 TIMES DAILY
Status: DISCONTINUED | OUTPATIENT
Start: 2021-01-01 | End: 2021-01-01

## 2021-01-01 RX ORDER — CEFAZOLIN SODIUM 1 G/50ML
1000 SOLUTION INTRAVENOUS ONCE
Status: COMPLETED | OUTPATIENT
Start: 2021-01-01 | End: 2021-01-01

## 2021-01-01 RX ORDER — EZETIMIBE 10 MG/1
10 TABLET ORAL
Status: DISCONTINUED | OUTPATIENT
Start: 2021-01-01 | End: 2021-01-01

## 2021-01-01 RX ORDER — LORAZEPAM 2 MG/ML
0.5 INJECTION INTRAMUSCULAR
Status: DISCONTINUED | OUTPATIENT
Start: 2021-01-01 | End: 2021-01-01 | Stop reason: HOSPADM

## 2021-01-01 RX ORDER — POTASSIUM CHLORIDE 14.9 MG/ML
20 INJECTION INTRAVENOUS EVERY 4 HOURS
Status: COMPLETED | OUTPATIENT
Start: 2021-01-01 | End: 2021-01-01

## 2021-01-01 RX ORDER — POLYETHYLENE GLYCOL 3350 17 G/17G
17 POWDER, FOR SOLUTION ORAL DAILY PRN
Status: DISCONTINUED | OUTPATIENT
Start: 2021-01-01 | End: 2021-01-01

## 2021-01-01 RX ORDER — BISACODYL 10 MG
10 SUPPOSITORY, RECTAL RECTAL DAILY PRN
Status: DISCONTINUED | OUTPATIENT
Start: 2021-01-01 | End: 2021-01-01 | Stop reason: HOSPADM

## 2021-01-01 RX ORDER — ASCORBIC ACID 500 MG
1000 TABLET ORAL EVERY 12 HOURS SCHEDULED
Status: ACTIVE | OUTPATIENT
Start: 2021-01-01 | End: 2021-01-01

## 2021-01-01 RX ORDER — HEPARIN SODIUM 5000 [USP'U]/ML
5000 INJECTION, SOLUTION INTRAVENOUS; SUBCUTANEOUS EVERY 8 HOURS SCHEDULED
Status: DISCONTINUED | OUTPATIENT
Start: 2021-01-01 | End: 2021-01-01

## 2021-01-01 RX ORDER — LEVOTHYROXINE SODIUM 0.05 MG/1
50 TABLET ORAL
Status: DISCONTINUED | OUTPATIENT
Start: 2021-01-01 | End: 2021-01-01

## 2021-01-01 RX ORDER — ASPIRIN 81 MG/1
81 TABLET ORAL DAILY
Status: DISCONTINUED | OUTPATIENT
Start: 2021-01-01 | End: 2021-01-01

## 2021-01-01 RX ORDER — POTASSIUM CHLORIDE 14.9 MG/ML
20 INJECTION INTRAVENOUS ONCE
Status: COMPLETED | OUTPATIENT
Start: 2021-01-01 | End: 2021-01-01

## 2021-01-01 RX ORDER — DEXTROSE MONOHYDRATE 50 MG/ML
75 INJECTION, SOLUTION INTRAVENOUS CONTINUOUS
Status: DISCONTINUED | OUTPATIENT
Start: 2021-01-01 | End: 2021-01-01

## 2021-01-01 RX ORDER — ONDANSETRON 2 MG/ML
4 INJECTION INTRAMUSCULAR; INTRAVENOUS EVERY 6 HOURS PRN
Status: DISCONTINUED | OUTPATIENT
Start: 2021-01-01 | End: 2021-01-01 | Stop reason: HOSPADM

## 2021-01-01 RX ORDER — ACETAMINOPHEN 325 MG/1
650 TABLET ORAL EVERY 6 HOURS PRN
Status: DISCONTINUED | OUTPATIENT
Start: 2021-01-01 | End: 2021-01-01

## 2021-01-01 RX ORDER — DEXTROSE MONOHYDRATE 25 G/50ML
INJECTION, SOLUTION INTRAVENOUS
Status: DISPENSED
Start: 2021-01-01 | End: 2021-01-01

## 2021-01-01 RX ORDER — HYDROMORPHONE HCL IN WATER/PF 6 MG/30 ML
0.2 PATIENT CONTROLLED ANALGESIA SYRINGE INTRAVENOUS
Status: DISCONTINUED | OUTPATIENT
Start: 2021-01-01 | End: 2021-01-01 | Stop reason: HOSPADM

## 2021-01-01 RX ORDER — MAGNESIUM HYDROXIDE/ALUMINUM HYDROXICE/SIMETHICONE 120; 1200; 1200 MG/30ML; MG/30ML; MG/30ML
30 SUSPENSION ORAL EVERY 6 HOURS PRN
Status: DISCONTINUED | OUTPATIENT
Start: 2021-01-01 | End: 2021-01-01

## 2021-01-01 RX ORDER — SODIUM CHLORIDE, SODIUM GLUCONATE, SODIUM ACETATE, POTASSIUM CHLORIDE, MAGNESIUM CHLORIDE, SODIUM PHOSPHATE, DIBASIC, AND POTASSIUM PHOSPHATE .53; .5; .37; .037; .03; .012; .00082 G/100ML; G/100ML; G/100ML; G/100ML; G/100ML; G/100ML; G/100ML
500 INJECTION, SOLUTION INTRAVENOUS ONCE
Status: COMPLETED | OUTPATIENT
Start: 2021-01-01 | End: 2021-01-01

## 2021-01-01 RX ADMIN — CEFAZOLIN SODIUM 500 MG: 1 INJECTION, POWDER, FOR SOLUTION INTRAMUSCULAR; INTRAVENOUS at 09:47

## 2021-01-01 RX ADMIN — CEFAZOLIN SODIUM 500 MG: 1 INJECTION, POWDER, FOR SOLUTION INTRAMUSCULAR; INTRAVENOUS at 22:11

## 2021-01-01 RX ADMIN — HEPARIN SODIUM 5000 UNITS: 5000 INJECTION INTRAVENOUS; SUBCUTANEOUS at 21:36

## 2021-01-01 RX ADMIN — POTASSIUM CHLORIDE 20 MEQ: 14.9 INJECTION, SOLUTION INTRAVENOUS at 12:07

## 2021-01-01 RX ADMIN — HEPARIN SODIUM 5000 UNITS: 5000 INJECTION INTRAVENOUS; SUBCUTANEOUS at 15:41

## 2021-01-01 RX ADMIN — DEXTROSE 75 ML/HR: 5 SOLUTION INTRAVENOUS at 06:09

## 2021-01-01 RX ADMIN — HEPARIN SODIUM 5000 UNITS: 5000 INJECTION INTRAVENOUS; SUBCUTANEOUS at 06:31

## 2021-01-01 RX ADMIN — DEXTROSE 75 ML/HR: 5 SOLUTION INTRAVENOUS at 17:37

## 2021-01-01 RX ADMIN — HEPARIN SODIUM 5000 UNITS: 5000 INJECTION INTRAVENOUS; SUBCUTANEOUS at 06:10

## 2021-01-01 RX ADMIN — CEFTRIAXONE 2000 MG: 2 INJECTION, POWDER, FOR SOLUTION INTRAMUSCULAR; INTRAVENOUS at 22:29

## 2021-01-01 RX ADMIN — LORAZEPAM 0.5 MG: 2 INJECTION INTRAMUSCULAR; INTRAVENOUS at 13:25

## 2021-01-01 RX ADMIN — HEPARIN SODIUM 5000 UNITS: 5000 INJECTION INTRAVENOUS; SUBCUTANEOUS at 21:38

## 2021-01-01 RX ADMIN — HEPARIN SODIUM 5000 UNITS: 5000 INJECTION INTRAVENOUS; SUBCUTANEOUS at 05:28

## 2021-01-01 RX ADMIN — CEFAZOLIN SODIUM 1000 MG: 1 SOLUTION INTRAVENOUS at 11:50

## 2021-01-01 RX ADMIN — CEFAZOLIN SODIUM 500 MG: 1 INJECTION, POWDER, FOR SOLUTION INTRAMUSCULAR; INTRAVENOUS at 23:12

## 2021-01-01 RX ADMIN — HYDROMORPHONE HYDROCHLORIDE 0.2 MG: 0.2 INJECTION, SOLUTION INTRAMUSCULAR; INTRAVENOUS; SUBCUTANEOUS at 12:20

## 2021-01-01 RX ADMIN — HEPARIN SODIUM 5000 UNITS: 5000 INJECTION INTRAVENOUS; SUBCUTANEOUS at 23:20

## 2021-01-01 RX ADMIN — DEXTROSE 75 ML/HR: 5 SOLUTION INTRAVENOUS at 15:40

## 2021-01-01 RX ADMIN — HEPARIN SODIUM 5000 UNITS: 5000 INJECTION INTRAVENOUS; SUBCUTANEOUS at 22:11

## 2021-01-01 RX ADMIN — DEXTROSE 75 ML/HR: 5 SOLUTION INTRAVENOUS at 05:29

## 2021-01-01 RX ADMIN — CEFAZOLIN SODIUM 500 MG: 1 INJECTION, POWDER, FOR SOLUTION INTRAMUSCULAR; INTRAVENOUS at 21:41

## 2021-01-01 RX ADMIN — HEPARIN SODIUM 5000 UNITS: 5000 INJECTION INTRAVENOUS; SUBCUTANEOUS at 15:16

## 2021-01-01 RX ADMIN — HEPARIN SODIUM 5000 UNITS: 5000 INJECTION INTRAVENOUS; SUBCUTANEOUS at 06:09

## 2021-01-01 RX ADMIN — SODIUM CHLORIDE, SODIUM GLUCONATE, SODIUM ACETATE, POTASSIUM CHLORIDE, MAGNESIUM CHLORIDE, SODIUM PHOSPHATE, DIBASIC, AND POTASSIUM PHOSPHATE 500 ML: .53; .5; .37; .037; .03; .012; .00082 INJECTION, SOLUTION INTRAVENOUS at 19:55

## 2021-01-01 RX ADMIN — DEXTROSE 75 ML/HR: 5 SOLUTION INTRAVENOUS at 21:18

## 2021-01-01 RX ADMIN — HEPARIN SODIUM 5000 UNITS: 5000 INJECTION INTRAVENOUS; SUBCUTANEOUS at 21:07

## 2021-01-01 RX ADMIN — HEPARIN SODIUM 5000 UNITS: 5000 INJECTION INTRAVENOUS; SUBCUTANEOUS at 22:28

## 2021-01-01 RX ADMIN — ACETAMINOPHEN 325 MG: 325 SUPPOSITORY RECTAL at 00:26

## 2021-01-01 RX ADMIN — HEPARIN SODIUM 5000 UNITS: 5000 INJECTION INTRAVENOUS; SUBCUTANEOUS at 14:35

## 2021-01-01 RX ADMIN — HEPARIN SODIUM 5000 UNITS: 5000 INJECTION INTRAVENOUS; SUBCUTANEOUS at 20:28

## 2021-01-01 RX ADMIN — HEPARIN SODIUM 5000 UNITS: 5000 INJECTION INTRAVENOUS; SUBCUTANEOUS at 15:56

## 2021-01-01 RX ADMIN — HEPARIN SODIUM 5000 UNITS: 5000 INJECTION INTRAVENOUS; SUBCUTANEOUS at 16:52

## 2021-01-01 RX ADMIN — CEFTRIAXONE 2000 MG: 2 INJECTION, POWDER, FOR SOLUTION INTRAMUSCULAR; INTRAVENOUS at 22:41

## 2021-01-01 RX ADMIN — POTASSIUM CHLORIDE 20 MEQ: 14.9 INJECTION, SOLUTION INTRAVENOUS at 18:09

## 2021-01-01 RX ADMIN — HEPARIN SODIUM 5000 UNITS: 5000 INJECTION INTRAVENOUS; SUBCUTANEOUS at 05:54

## 2021-01-01 RX ADMIN — POTASSIUM CHLORIDE 20 MEQ: 14.9 INJECTION, SOLUTION INTRAVENOUS at 16:12

## 2021-01-01 RX ADMIN — HEPARIN SODIUM 5000 UNITS: 5000 INJECTION INTRAVENOUS; SUBCUTANEOUS at 14:52

## 2021-01-01 RX ADMIN — HEPARIN SODIUM 5000 UNITS: 5000 INJECTION INTRAVENOUS; SUBCUTANEOUS at 10:17

## 2021-01-01 RX ADMIN — HEPARIN SODIUM 5000 UNITS: 5000 INJECTION INTRAVENOUS; SUBCUTANEOUS at 05:01

## 2021-01-01 RX ADMIN — DEXTROSE 75 ML/HR: 5 SOLUTION INTRAVENOUS at 18:24

## 2021-01-01 RX ADMIN — SCOPALAMINE 1 PATCH: 1 PATCH, EXTENDED RELEASE TRANSDERMAL at 10:31

## 2021-01-01 RX ADMIN — HEPARIN SODIUM 5000 UNITS: 5000 INJECTION INTRAVENOUS; SUBCUTANEOUS at 14:00

## 2021-01-01 RX ADMIN — CEFAZOLIN SODIUM 500 MG: 1 INJECTION, POWDER, FOR SOLUTION INTRAMUSCULAR; INTRAVENOUS at 21:38

## 2021-01-01 RX ADMIN — DEXTROSE 75 ML/HR: 5 SOLUTION INTRAVENOUS at 18:39

## 2021-01-01 RX ADMIN — HEPARIN SODIUM 5000 UNITS: 5000 INJECTION INTRAVENOUS; SUBCUTANEOUS at 22:29

## 2021-01-01 RX ADMIN — DEXTROSE MONOHYDRATE 50 ML: 500 INJECTION PARENTERAL at 18:08

## 2021-01-01 RX ADMIN — DEXTROSE 75 ML/HR: 5 SOLUTION INTRAVENOUS at 06:10

## 2021-01-01 RX ADMIN — CEFTRIAXONE 2000 MG: 2 INJECTION, POWDER, FOR SOLUTION INTRAMUSCULAR; INTRAVENOUS at 20:59

## 2021-01-01 RX ADMIN — LORAZEPAM 0.5 MG: 2 INJECTION INTRAMUSCULAR; INTRAVENOUS at 18:10

## 2021-01-01 RX ADMIN — HEPARIN SODIUM 5000 UNITS: 5000 INJECTION INTRAVENOUS; SUBCUTANEOUS at 05:18

## 2021-01-01 RX ADMIN — DEXTROSE 75 ML/HR: 5 SOLUTION INTRAVENOUS at 20:44

## 2021-01-01 RX ADMIN — ACETAMINOPHEN 325 MG: 325 SUPPOSITORY RECTAL at 22:39

## 2021-01-01 RX ADMIN — DEXTROSE 75 ML/HR: 5 SOLUTION INTRAVENOUS at 05:49

## 2021-01-01 RX ADMIN — DEXTROSE 75 ML/HR: 5 SOLUTION INTRAVENOUS at 15:11

## 2021-01-01 RX ADMIN — DEXTROSE 75 ML/HR: 5 SOLUTION INTRAVENOUS at 02:28

## 2021-01-01 RX ADMIN — CEFAZOLIN SODIUM 500 MG: 1 INJECTION, POWDER, FOR SOLUTION INTRAMUSCULAR; INTRAVENOUS at 11:29

## 2021-01-01 RX ADMIN — HEPARIN SODIUM 5000 UNITS: 5000 INJECTION INTRAVENOUS; SUBCUTANEOUS at 21:14

## 2021-01-01 RX ADMIN — HEPARIN SODIUM 5000 UNITS: 5000 INJECTION INTRAVENOUS; SUBCUTANEOUS at 14:49

## 2021-01-01 RX ADMIN — DEXTROSE 75 ML/HR: 5 SOLUTION INTRAVENOUS at 06:19

## 2021-01-01 RX ADMIN — CEFTRIAXONE 2000 MG: 2 INJECTION, POWDER, FOR SOLUTION INTRAMUSCULAR; INTRAVENOUS at 20:26

## 2021-01-01 RX ADMIN — HEPARIN SODIUM 5000 UNITS: 5000 INJECTION INTRAVENOUS; SUBCUTANEOUS at 05:45

## 2021-01-01 RX ADMIN — POTASSIUM CHLORIDE 20 MEQ: 14.9 INJECTION, SOLUTION INTRAVENOUS at 14:34

## 2021-01-01 RX ADMIN — HEPARIN SODIUM 5000 UNITS: 5000 INJECTION INTRAVENOUS; SUBCUTANEOUS at 22:42

## 2021-01-01 RX ADMIN — POTASSIUM CHLORIDE 20 MEQ: 14.9 INJECTION, SOLUTION INTRAVENOUS at 10:07

## 2021-01-01 RX ADMIN — DEXTROSE MONOHYDRATE 50 ML: 25 INJECTION, SOLUTION INTRAVENOUS at 18:38

## 2021-01-01 RX ADMIN — CEFAZOLIN SODIUM 500 MG: 1 INJECTION, POWDER, FOR SOLUTION INTRAMUSCULAR; INTRAVENOUS at 11:30

## 2021-08-10 PROBLEM — J96.01 ACUTE RESPIRATORY FAILURE WITH HYPOXIA (HCC): Status: ACTIVE | Noted: 2021-01-01

## 2021-08-10 PROBLEM — E87.0 HYPERNATREMIA: Status: ACTIVE | Noted: 2021-01-01

## 2021-08-10 PROBLEM — U07.1 COVID-19: Status: ACTIVE | Noted: 2021-01-01

## 2021-08-10 NOTE — ASSESSMENT & PLAN NOTE
· Per ER, slightly hypoxic, now maintaining mid 90s on room air  · In the setting of COVID-19 pneumonia, will monitor closely  · O2 supplement as needed to maintain > 90%  · Monitor closely in the setting of IV fluids for hypernatremia

## 2021-08-10 NOTE — ED PROVIDER NOTES
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 Taking?    Memantine HCl ER 28 MG CP24   Yes Yes   Sig: Take 14 mg by mouth daily   ZETIA 10 MG tablet Not Taking at Unknown time Self Yes No   Patient not taking: Reported on 8/10/2021   alendronate (FOSAMAX) 70 mg tablet Not Taking at Unknown time Self Yes No   Sig: Take 1 tablet by mouth once a week   Patient not taking: Reported on 8/10/2021   aspirin (ASPIR-LOW) 81 mg EC tablet Not Taking at Unknown time Self Yes No   Sig: Take 1 tablet by mouth daily   Patient not taking: Reported on 8/10/2021   candesartan (ATACAND) 8 MG tablet   Yes Yes   Sig: Take 4 mg by mouth daily    carbidopa-levodopa-entacapone (STALEVO) 18 75- MG per tablet   No Yes   Sig: Take 1 tablet by mouth 2 (two) times a day   hydrocortisone (ANUSOL-HC) 2 5 % rectal cream Not Taking at Unknown time  No No   Sig: Apply topically 2 (two) times a day   Patient not taking: Reported on 8/10/2021   levothyroxine 50 mcg tablet  Self Yes Yes   losartan (COZAAR) 50 mg tablet Not Taking at Unknown time Self Yes No   Sig: Take 50 mg by mouth daily   Patient not taking: Reported on 8/10/2021   pantoprazole (PROTONIX) 40 mg tablet Not Taking at Unknown time  No No Sig: Take 1 tablet (40 mg total) by mouth daily   Patient not taking: Reported on 8/10/2021   polyethylene glycol (MIRALAX) 17 g packet Not Taking at Unknown time  No No   Sig: Take 17 g by mouth daily   Patient not taking: Reported on 8/10/2021   rotigotine (NEUPRO) 1 MG/24HR transdermal patch Not Taking at Unknown time  Yes No   Sig: Place 1 patch on the skin daily   Patient not taking: Reported on 8/10/2021      Facility-Administered Medications: None       Past Medical History:   Diagnosis Date    Constipation     Hypertension     Osteoporosis     Parkinson disease ([facility] [address] )        Past Surgical History:   Procedure Laterality Date    BASAL CELL CARCINOMA EXCISION      above lip    BREAST CYST EXCISION      COLONOSCOPY      HYSTERECTOMY      ROTATOR CUFF REPAIR Right        Family History   Problem Relation Age of Onset    Heart attack Mother      I have reviewed and agree with the history as documented  E-Cigarette/Vaping     E-Cigarette/Vaping Substances     Social History     Tobacco Use    Smoking status: Never Smoker    Smokeless tobacco: Never Used   Substance Use Topics    Alcohol use: Not Currently    Drug use: No       Review of Systems   Unable to perform ROS: Patient nonverbal       Physical Exam  Physical Exam  Vitals and nursing note reviewed  Constitutional:       General: She is not in acute distress  HENT:      Head: Normocephalic and atraumatic  Eyes:      Conjunctiva/sclera: Conjunctivae normal    Cardiovascular:      Rate and Rhythm: Normal rate and regular rhythm  Heart sounds: Normal heart sounds  Pulmonary:      Effort: Pulmonary effort is normal  No respiratory distress  Breath sounds: Normal breath sounds  Abdominal:      General: Bowel sounds are normal       Palpations: Abdomen is soft  Tenderness: There is no abdominal tenderness  Musculoskeletal:         General: Normal range of motion  Cervical back: Normal range of motion        Comments: Chronic right foot wound   Skin:     General: Skin is warm and dry  Findings: No rash  Neurological:      Mental Status: She is alert and oriented to person, place, and time  Cranial Nerves: No cranial nerve deficit  Sensory: No sensory deficit  Motor: No abnormal muscle tone        Coordination: Coordination normal          Vital Signs  ED Triage Vitals [08/10/21 1651]   Temperature Pulse Respirations Blood Pressure SpO2   97 9 °F (36 6 °C) 85 22 (!) 89/57 (!) 89 %      Temp Source Heart Rate Source Patient Position - Orthostatic VS BP Location FiO2 (%)   Temporal Monitor Sitting Left arm --      Pain Score       --           Vitals:    08/11/21 0300 08/11/21 1330 08/11/21 1522 08/11/21 1526   BP: 122/65 135/75 126/72 126/72   Pulse: 80 82  86   Patient Position - Orthostatic VS:  Lying           Visual Acuity  Visual Acuity      Most Recent Value   L Pupil Size (mm)  2   R Pupil Size (mm)  2   L Pupil Shape  Round   R Pupil Shape  Round          ED Medications  Medications   aspirin (ECOTRIN LOW STRENGTH) EC tablet 81 mg (81 mg Oral Not Given 8/11/21 0953)   carbidopa-levodopa (SINEMET)  mg per tablet 1 tablet (1 tablet Oral Not Given 8/11/21 0954)   levothyroxine tablet 50 mcg (50 mcg Oral Not Given 8/11/21 0601)   memantine (NAMENDA) tablet 10 mg (10 mg Oral Not Given 8/11/21 0954)   pantoprazole (PROTONIX) EC tablet 40 mg (40 mg Oral Not Given 8/11/21 0601)   rotigotine (NEUPRO) 1 MG/24HR transdermal patch 1 patch (1 patch Transdermal Not Given 8/11/21 0954)   ezetimibe (ZETIA) tablet 10 mg (10 mg Oral Not Given 8/10/21 2208)   acetaminophen (TYLENOL) tablet 650 mg (has no administration in time range)   aluminum-magnesium hydroxide-simethicone (MYLANTA) oral suspension 30 mL (has no administration in time range)   polyethylene glycol (MIRALAX) packet 17 g (has no administration in time range)   ondansetron (ZOFRAN) injection 4 mg (has no administration in time range) cholecalciferol (VITAMIN D3) tablet 2,000 Units (2,000 Units Oral Not Given 8/11/21 0954)   ascorbic acid (VITAMIN C) tablet 1,000 mg (1,000 mg Oral Not Given 8/11/21 0950)   zinc sulfate (ZINCATE) capsule 220 mg (220 mg Oral Not Given 8/11/21 0955)     Followed by   multivitamin-minerals (CENTRUM ADULTS) tablet 1 tablet (has no administration in time range)   heparin (porcine) subcutaneous injection 5,000 Units (5,000 Units Subcutaneous Given 8/11/21 0528)   cefTRIAXone (ROCEPHIN) 2,000 mg in dextrose 5 % 50 mL IVPB (has no administration in time range)   dextrose 5 % infusion (75 mL/hr Intravenous New Bag 8/11/21 0529)   multi-electrolyte (ISOLYTE-S PH 7 4) bolus 500 mL (0 mL Intravenous Stopped 8/10/21 2055)   cefTRIAXone (ROCEPHIN) 2,000 mg in dextrose 5 % 50 mL IVPB (0 mg Intravenous Stopped 8/10/21 2129)       Diagnostic Studies  Results Reviewed     Procedure Component Value Units Date/Time    Blood culture #1 [[id number]]  (Abnormal) Collected: 08/10/21 2000    Lab Status: Preliminary result Specimen: Blood from Arm, Left Updated: 08/11/21 1420     Gram Stain Result Gram positive cocci in clusters    Troponin I [[id number]]  (Abnormal) Collected: 08/11/21 1201    Lab Status: Final result Specimen: Blood from Arm, Left Updated: 08/11/21 1250     Troponin I 0 19 ng/mL     Fingerstick Glucose (POCT) [[id number]]  (Normal) Collected: 08/11/21 1206    Lab Status: Final result Updated: 08/11/21 1209     POC Glucose 134 mg/dl     Procalcitonin Reflex [[id number]]  (Abnormal) Collected: 08/11/21 0530    Lab Status: Final result Specimen: Blood from Arm, Right Updated: 08/11/21 0909     Procalcitonin 1 20 ng/ml     Basic metabolic panel [[id number]]  (Abnormal) Collected: 08/11/21 0530    Lab Status: Final result Specimen: Blood from Arm, Right Updated: 08/11/21 0726     Sodium 161 mmol/L      Potassium 3 3 mmol/L      Chloride 122 mmol/L      CO2 26 mmol/L      ANION GAP 13 mmol/L      BUN 44 mg/dL      Creatinine 1 18 mg/dL      Glucose 79 mg/dL      Calcium 8 1 mg/dL      eGFR 42 ml/min/1 73sq m     Narrative:      [place] guidelines for Chronic Kidney Disease (CKD):     Stage 1 with normal or high GFR (GFR > 90 mL/min/1 73 square meters)    Stage 2 Mild CKD (GFR = 60-89 mL/min/1 73 square meters)    Stage 3A Moderate CKD (GFR = 45-59 mL/min/1 73 square meters)    Stage 3B Moderate CKD (GFR = 30-44 mL/min/1 73 square meters)    Stage 4 Severe CKD (GFR = 15-29 mL/min/1 73 square meters)    Stage 5 End Stage CKD (GFR <15 mL/min/1 73 square meters)  Note: GFR calculation is accurate only with a steady state creatinine    C-reactive protein [[id number]]  (Abnormal) Collected: 08/11/21 0530    Lab Status: Final result Specimen: Blood from Arm, Right Updated: 08/11/21 0715      8 mg/L     Troponin I [[id number]]  (Abnormal) Collected: 08/11/21 0650    Lab Status: Final result Specimen: Blood from Arm, Left Updated: 08/11/21 0714     Troponin I 0 20 ng/mL     D-dimer, quantitative [[id number]]  (Abnormal) Collected: 08/11/21 0530    Lab Status: Final result Specimen: Blood from Arm, Right Updated: 08/11/21 0623     D-Dimer, Quant 1 12 ug/ml FEU     CBC (With Platelets) [[id number]]  (Abnormal) Collected: 08/11/21 0530    Lab Status: Final result Specimen: Blood from Arm, Right Updated: 08/11/21 0556     WBC 6 04 Thousand/uL      RBC 3 51 Million/uL      Hemoglobin 10 5 g/dL      Hematocrit 34 5 %      MCV 98 fL      MCH 29 9 pg      MCHC 30 4 g/dL      RDW 14 7 %      Platelets 639 Thousands/uL      MPV 10 5 fL     Procalcitonin with AM Reflex [[id number]]  (Abnormal) Collected: 08/10/21 2000    Lab Status: Final result Specimen: Blood from Arm, Left Updated: 08/11/21 0127     Procalcitonin 1 33 ng/ml     Blood culture #2 [[id number]] Collected: 08/10/21 2000    Lab Status: Preliminary result Specimen: Blood from Arm, Left Updated: 08/11/21 0101     Blood Culture Received in Microbiology Lab   Culture in Progress      Ferritin [[id number]]  (Normal) Collected: 08/10/21 1748    Lab Status: Final result Specimen: Blood from Arm, Left Updated: 08/11/21 0052     Ferritin 166 ng/mL     Basic metabolic panel [[id number]]  (Abnormal) Collected: 08/10/21 2321    Lab Status: Final result Specimen: Blood from Arm, Right Updated: 08/10/21 2347     Sodium 160 mmol/L      Potassium 3 5 mmol/L      Chloride 121 mmol/L      CO2 27 mmol/L      ANION GAP 12 mmol/L      BUN 47 mg/dL      Creatinine 1 27 mg/dL      Glucose 100 mg/dL      Calcium 8 0 mg/dL      eGFR 38 ml/min/1 73sq m     Narrative:      [place] guidelines for Chronic Kidney Disease (CKD):     Stage 1 with normal or high GFR (GFR > 90 mL/min/1 73 square meters)    Stage 2 Mild CKD (GFR = 60-89 mL/min/1 73 square meters)    Stage 3A Moderate CKD (GFR = 45-59 mL/min/1 73 square meters)    Stage 3B Moderate CKD (GFR = 30-44 mL/min/1 73 square meters)    Stage 4 Severe CKD (GFR = 15-29 mL/min/1 73 square meters)    Stage 5 End Stage CKD (GFR <15 mL/min/1 73 square meters)  Note: GFR calculation is accurate only with a steady state creatinine    Troponin I [[id number]]  (Abnormal) Collected: 08/10/21 2321    Lab Status: Final result Specimen: Blood from Arm, Right Updated: 08/10/21 2343     Troponin I 0 16 ng/mL     Urine Microscopic [[id number]]  (Abnormal) Collected: 08/10/21 2001    Lab Status: Final result Specimen: Urine, Straight Cath Updated: 08/10/21 2036     RBC, UA None Seen /hpf      WBC, UA 0-1 /hpf      Epithelial Cells Occasional /hpf      Bacteria, UA Occasional /hpf      Hyaline Casts, UA 1-2 /lpf      Fine granular casts 0-1 /lpf     UA (URINE) with reflex to Scope [[id number]]  (Abnormal) Collected: 08/10/21 2001    Lab Status: Final result Specimen: Urine, Straight Cath Updated: 08/10/21 2009     Color, UA Yellow     Clarity, UA Slightly Cloudy     Specific Gravity, UA 1 025     pH, UA 5 0     Leukocytes, UA Negative Nitrite, UA Negative     Protein, UA Trace mg/dl      Glucose, UA Negative mg/dl      Ketones, UA Negative mg/dl      Urobilinogen, UA 0 2 E U /dl      Bilirubin, UA Negative     Blood, UA Negative    CKMB [[id number]]  (Normal) Collected: 08/10/21 1748    Lab Status: Final result Specimen: Blood from Arm, Left Updated: 08/10/21 1938     CK-MB Index 1 9 %      CK-MB 2 8 ng/mL     C-reactive protein [[id number]]  (Abnormal) Collected: 08/10/21 1748    Lab Status: Final result Specimen: Blood from Arm, Left Updated: 08/1934      1 mg/L     Comprehensive metabolic panel [[id number]]  (Abnormal) Collected: 08/10/21 1748    Lab Status: Final result Specimen: Blood from Arm, Left Updated: 08/10/21 1914     Sodium 159 mmol/L      Potassium 3 8 mmol/L      Chloride 118 mmol/L      CO2 28 mmol/L      ANION GAP 13 mmol/L      BUN 48 mg/dL      Creatinine 1 56 mg/dL      Glucose 113 mg/dL      Calcium 8 6 mg/dL      Corrected Calcium 9 6 mg/dL      AST 42 U/L      ALT 32 U/L      Alkaline Phosphatase 77 U/L      Total Protein 7 6 g/dL      Albumin 2 7 g/dL      Total Bilirubin 0 53 mg/dL      eGFR 30 ml/min/1 73sq m     Narrative:      [place] guidelines for Chronic Kidney Disease (CKD):     Stage 1 with normal or high GFR (GFR > 90 mL/min/1 73 square meters)    Stage 2 Mild CKD (GFR = 60-89 mL/min/1 73 square meters)    Stage 3A Moderate CKD (GFR = 45-59 mL/min/1 73 square meters)    Stage 3B Moderate CKD (GFR = 30-44 mL/min/1 73 square meters)    Stage 4 Severe CKD (GFR = 15-29 mL/min/1 73 square meters)    Stage 5 End Stage CKD (GFR <15 mL/min/1 73 square meters)  Note: GFR calculation is accurate only with a steady state creatinine    CK [[id number]]  (Normal) Collected: 08/10/21 1748    Lab Status: Final result Specimen: Blood from Arm, Left Updated: 08/10/21 1914     Total  U/L     LD,Blood [[id number]]  (Abnormal) Collected: 08/10/21 1748    Lab Status: Final result Specimen: Blood from Arm, Left Updated: 08/10/21 1914      U/L     NT-BNP PRO [[id number]]  (Abnormal) Collected: 08/10/21 1748    Lab Status: Final result Specimen: Blood from Arm, Left Updated: 08/10/21 1914     NT-proBNP 2,460 pg/mL     Novel Coronavirus (Covid-19),PCR [facility] - 2 Hour Stat [[id number]]  (Abnormal) Collected: 08/10/21 1748    Lab Status: Final result Specimen: Nares from Nose Updated: 08/10/21 1901     SARS-CoV-2 Positive    Narrative: The specimen collection materials, transport medium, and/or testing methodology utilized in the production of these test results have been proven to be reliable in a limited validation with an abbreviated program under the Emergency Utilization Authorization provided by the FDA  Testing reported as "Presumptive positive" will be confirmed with secondary testing to ensure result accuracy  Clinical caution and judgement should be used with the interpretation of these results with consideration of the clinical impression and other laboratory testing  Testing reported as "Positive" or "Negative" has been proven to be accurate according to standard laboratory validation requirements  All testing is performed with control materials showing appropriate reactivity at standard intervals        Sedimentation rate, automated [[id number]]  (Abnormal) Collected: 08/10/21 1748    Lab Status: Final result Specimen: Blood from Arm, Left Updated: 08/10/21 1847     Sed Rate 79 mm/hour     D-dimer, quantitative [[id number]]  (Abnormal) Collected: 08/10/21 1748    Lab Status: Final result Specimen: Blood from Arm, Left Updated: 08/10/21 1843     D-Dimer, Quant 0 94 ug/ml FEU     Troponin I [[id number]]  (Abnormal) Collected: 08/10/21 1748    Lab Status: Final result Specimen: Blood from Arm, Left Updated: 08/10/21 1839     Troponin I 0 13 ng/mL     CBC and differential [[id number]]  (Abnormal) Collected: 08/10/21 1748    Lab Status: Final result Specimen: Blood from Arm, Left Updated: 08/10/21 1826     WBC 6 85 Thousand/uL      RBC 4 17 Million/uL      Hemoglobin 12 4 g/dL      Hematocrit 41 1 %      MCV 99 fL      MCH 29 7 pg      MCHC 30 2 g/dL      RDW 14 7 %      MPV 10 2 fL      Platelets 814 Thousands/uL      nRBC 0 /100 WBCs      Neutrophils Relative 92 %      Immat GRANS % 0 %      Lymphocytes Relative 5 %      Monocytes Relative 3 %      Eosinophils Relative 0 %      Basophils Relative 0 %      Neutrophils Absolute 6 31 Thousands/µL      Immature Grans Absolute 0 02 Thousand/uL      Lymphocytes Absolute 0 33 Thousands/µL      Monocytes Absolute 0 18 Thousand/µL      Eosinophils Absolute 0 00 Thousand/µL      Basophils Absolute 0 01 Thousands/µL     Interleukin-6,Serum [[id number]] Collected: 08/10/21 1748    Lab Status: In process Specimen: Blood from Arm, Left Updated: 08/10/21 1753                 XR chest 1 view portable   Final Result by Michael Gaxiola MD (08/11 3309)      No acute cardiopulmonary disease  In the setting of clinically suspected/proven COVID-19, this plain film appearance does not contain findings that raise concern for viral pneumonia such as COVID-19, but does not rule out this diagnosis  Workstation performed: [id number]                    Procedures  Procedures         ED Course  ED Course as of Aug 11 1629   Tue Aug 10, 2021   1828 WBC: 6 85   1916 SARS-COV-2(!): Positive   1917 Chronically elevated   Troponin I(!): 0 13           [facility]      80year-old COVID positive female, presents for fatigue and failure to thrive, hypernatremia, given isolate, baseline elevation troponin, high-risk patient, initial hypoxia, patient require admission to medicine for further management       Disposition  Final diagnoses:   Pneumonia due to COVID-19 virus   SOB (shortness of breath)   Hypoxia   Hypernatremia   Positive D dimer     Time reflects when diagnosis was documented in both MDM as applicable and the Disposition within this note     Time User Action Codes Description Comment    8/10/2021  7:53 PM Jacqueline Seal Add [U07 1,  J12 82] Pneumonia due to COVID-19 virus     8/10/2021  7:53 PM Jacqueline Seal Add [R06 02] SOB (shortness of breath)     8/10/2021  7:53 PM Jacqueline Seal Add [R09 02] Hypoxia     8/10/2021  7:53 PM Jacqueline Seal Add [E87 0] Hypernatremia     8/10/2021  7:53 PM Jacqueline Seal Add [R79 89] Positive D dimer     8/10/2021  9:27 PM Surinder Segovia Add [E43] Severe protein-calorie malnutrition ([facility] [address] )     8/11/2021  3:13 PM Ede Cheeks Add [U07 1] COVID-19     8/11/2021  3:13 PM Mando Young Parkinson's disease ([facility] [address] )     8/11/2021  3:13 PM Ede Calvillos Add [R41 3] Memory deficit       ED Disposition     ED Disposition Condition Date/Time Comment    Admit Stable Tue Aug 10, 2021  7:53 PM Case was discussed with LILIANA and the patient's admission status was agreed to be Admission Status: inpatient status to the service of Dr Merlinda Ports          Follow-up Information    None         Current Discharge Medication List      CONTINUE these medications which have NOT CHANGED    Details   candesartan (ATACAND) 8 MG tablet Take 4 mg by mouth daily       carbidopa-levodopa-entacapone (STALEVO) 18 75- MG per tablet Take 1 tablet by mouth 2 (two) times a day  Qty: 60 tablet, Refills: 0    Associated Diagnoses: Parkinson's disease (HCC)      levothyroxine 50 mcg tablet Refills: 0      Memantine HCl ER 28 MG CP24 Take 14 mg by mouth daily      alendronate (FOSAMAX) 70 mg tablet Take 1 tablet by mouth once a week      aspirin (ASPIR-LOW) 81 mg EC tablet Take 1 tablet by mouth daily      hydrocortisone (ANUSOL-HC) 2 5 % rectal cream Apply topically 2 (two) times a day  Qty: 28 g, Refills: 0    Associated Diagnoses: Diarrhea      losartan (COZAAR) 50 mg tablet Take 50 mg by mouth daily  Refills: 0      pantoprazole (PROTONIX) 40 mg tablet Take 1 tablet (40 mg total) by mouth daily  Qty: 30 tablet, Refills: 0    Associated Diagnoses: Hematochezia polyethylene glycol (MIRALAX) 17 g packet Take 17 g by mouth daily  Qty: 30 each, Refills: 0    Associated Diagnoses: Slow transit constipation      rotigotine (NEUPRO) 1 MG/24HR transdermal patch Place 1 patch on the skin daily      ZETIA 10 MG tablet Refills: 0           No discharge procedures on file      PDMP Review     None          ED Provider  Electronically Signed by           Cassie Good MD  08/11/21 6909

## 2021-08-11 NOTE — PLAN OF CARE
Problem: Potential for Falls  Goal: Patient will remain free of falls  Description: INTERVENTIONS:  - Educate patient/family on patient safety including physical limitations  - Instruct patient to call for assistance with activity   - Consult OT/PT to assist with strengthening/mobility   - Keep Call bell within reach  - Keep bed low and locked with side rails adjusted as appropriate  - Keep care items and personal belongings within reach  - Initiate and maintain comfort rounds  - Make Fall Risk Sign visible to staff  - Offer Toileting every 4 Hours, in advance of need  - Initiate/Maintain bed alarm  - Obtain necessary fall risk management equipment:   - Apply yellow socks and bracelet for high fall risk patients  - Consider moving patient to room near nurses station  Outcome: Progressing     Problem: Prexisting or High Potential for Compromised Skin Integrity  Goal: Skin integrity is maintained or improved  Description: INTERVENTIONS:  - Identify patients at risk for skin breakdown  - Assess and monitor skin integrity  - Assess and monitor nutrition and hydration status  - Monitor labs   - Assess for incontinence   - Turn and reposition patient  - Assist with mobility/ambulation  - Relieve pressure over bony prominences  - Avoid friction and shearing  - Provide appropriate hygiene as needed including keeping skin clean and dry  - Evaluate need for skin moisturizer/barrier cream  - Collaborate with interdisciplinary team   - Patient/family teaching  - Consider wound care consult   Outcome: Progressing     Problem: Nutrition/Hydration-ADULT  Goal: Nutrient/Hydration intake appropriate for improving, restoring or maintaining nutritional needs  Description: Monitor and assess patient's nutrition/hydration status for malnutrition  Collaborate with interdisciplinary team and initiate plan and interventions as ordered  Monitor patient's weight and dietary intake as ordered or per policy   Utilize nutrition screening tool and intervene as necessary  Determine patient's food preferences and provide high-protein, high-caloric foods as appropriate       INTERVENTIONS:  - Monitor oral intake, urinary output, labs, and treatment plans  - Assess nutrition and hydration status and recommend course of action  - Evaluate amount of meals eaten  - Assist patient with eating if necessary   - Allow adequate time for meals  - Recommend/ encourage appropriate diets, oral nutritional supplements, and vitamin/mineral supplements  - Order, calculate, and assess calorie counts as needed  - Recommend, monitor, and adjust tube feedings and TPN/PPN based on assessed needs  - Assess need for intravenous fluids  - Provide specific nutrition/hydration education as appropriate  - Include patient/family/caregiver in decisions related to nutrition  Outcome: Progressing     Problem: MOBILITY - ADULT  Goal: Maintain or return to baseline ADL function  Description: INTERVENTIONS:  - Educate patient/family on patient safety including physical limitations  - Instruct patient to call for assistance with activity   - Consult OT/PT to assist with strengthening/mobility   - Keep Call bell within reach  - Keep bed low and locked with side rails adjusted as appropriate  - Keep care items and personal belongings within reach  - Initiate and maintain comfort rounds  - Make Fall Risk Sign visible to staff  - Offer Toileting every 4 Hours, in advance of need  - Initiate/Maintain bed alarm  - Obtain necessary fall risk management equipment:   - Apply yellow socks and bracelet for high fall risk patients  - Consider moving patient to room near nurses station  Outcome: Progressing  Goal: Maintains/Returns to pre admission functional level  Description: INTERVENTIONS:  -  Assess patient's ability to carry out ADLs; assess patient's baseline for ADL function and identify physical deficits which impact ability to perform ADLs (bathing, care of mouth/teeth, toileting, grooming, dressing, etc )  - Assess/evaluate cause of self-care deficits   - Assess range of motion  - Assess patient's mobility; develop plan if impaired  - Assess patient's need for assistive devices and provide as appropriate  - Encourage maximum independence but intervene and supervise when necessary  - Involve family in performance of ADLs  - Assess for home care needs following discharge   - Consider OT consult to assist with ADL evaluation and planning for discharge  - Provide patient education as appropriate  Outcome: Progressing     Problem: PAIN - ADULT  Goal: Verbalizes/displays adequate comfort level or baseline comfort level  Description: Interventions:  - Encourage patient to monitor pain and request assistance  - Assess pain using appropriate pain scale  - Administer analgesics based on type and severity of pain and evaluate response  - Implement non-pharmacological measures as appropriate and evaluate response  - Consider cultural and social influences on pain and pain management  - Notify physician/advanced practitioner if interventions unsuccessful or patient reports new pain  Outcome: Progressing     Problem: INFECTION - ADULT  Goal: Absence or prevention of progression during hospitalization  Description: INTERVENTIONS:  - Assess and monitor for signs and symptoms of infection  - Monitor lab/diagnostic results  - Monitor all insertion sites, i e  indwelling lines, tubes, and drains  - Monitor endotracheal if appropriate and nasal secretions for changes in amount and color  - [place] appropriate cooling/warming therapies per order  - Administer medications as ordered  - Instruct and encourage patient and family to use good hand hygiene technique  - Identify and instruct in appropriate isolation precautions for identified infection/condition  Outcome: Progressing  Goal: Absence of fever/infection during neutropenic period  Description: INTERVENTIONS:  - Monitor WBC    Outcome: Progressing     Problem: SAFETY ADULT  Goal: Patient will remain free of falls  Description: INTERVENTIONS:  - Educate patient/family on patient safety including physical limitations  - Instruct patient to call for assistance with activity   - Consult OT/PT to assist with strengthening/mobility   - Keep Call bell within reach  - Keep bed low and locked with side rails adjusted as appropriate  - Keep care items and personal belongings within reach  - Initiate and maintain comfort rounds  - Make Fall Risk Sign visible to staff  - Offer Toileting every 2 Hours, in advance of need  - Initiate/Maintain bed alarm  - Obtain necessary fall risk management equipment:   - Apply yellow socks and bracelet for high fall risk patients  - Consider moving patient to room near nurses station  Outcome: Progressing  Goal: Maintain or return to baseline ADL function  Description: INTERVENTIONS:  - Educate patient/family on patient safety including physical limitations  - Instruct patient to call for assistance with activity   - Consult OT/PT to assist with strengthening/mobility   - Keep Call bell within reach  - Keep bed low and locked with side rails adjusted as appropriate  - Keep care items and personal belongings within reach  - Initiate and maintain comfort rounds  - Make Fall Risk Sign visible to staff  - Offer Toileting every  Hours, in advance of need  - Initiate/Maintain alarm  - Obtain necessary fall risk management equipment:   - Apply yellow socks and bracelet for high fall risk patients  - Consider moving patient to room near nurses station  Outcome: Progressing  Goal: Maintains/Returns to pre admission functional level  Description: INTERVENTIONS:  -  Assess patient's ability to carry out ADLs; assess patient's baseline for ADL function and identify physical deficits which impact ability to perform ADLs (bathing, care of mouth/teeth, toileting, grooming, dressing, etc )  - Assess/evaluate cause of self-care deficits   - Assess range of motion  - Assess patient's mobility; develop plan if impaired  - Assess patient's need for assistive devices and provide as appropriate  - Encourage maximum independence but intervene and supervise when necessary  - Involve family in performance of ADLs  - Assess for home care needs following discharge   - Consider OT consult to assist with ADL evaluation and planning for discharge  - Provide patient education as appropriate  Outcome: Progressing     Problem: DISCHARGE PLANNING  Goal: Discharge to home or other facility with appropriate resources  Description: INTERVENTIONS:  - Identify barriers to discharge w/patient and caregiver  - Arrange for needed discharge resources and transportation as appropriate  - Identify discharge learning needs (meds, wound care, etc )  - Arrange for interpretive services to assist at discharge as needed  - Refer to Case Management Department for coordinating discharge planning if the patient needs post-hospital services based on physician/advanced practitioner order or complex needs related to functional status, cognitive ability, or social support system  Outcome: Progressing     Problem: Knowledge Deficit  Goal: Patient/family/caregiver demonstrates understanding of disease process, treatment plan, medications, and discharge instructions  Description: Complete learning assessment and assess knowledge base    Interventions:  - Provide teaching at level of understanding  - Provide teaching via preferred learning methods  Outcome: Progressing

## 2021-08-11 NOTE — RESPIRATORY THERAPY NOTE
RT Protocol Note  Paige Adamson 80 y o  female MRN: [id number]  Unit/Bed#: FT 02 Encounter: [id number]    Assessment    Principal Problem:    COVID-19  Active Problems:    MARY (acute kidney injury) ([facility] )    Hypothyroid    Parkinson's disease ([facility] )    Severe protein-calorie malnutrition ([facility] )    Hypernatremia    Acute respiratory failure with hypoxia ([place] [address] )      Home Pulmonary Medications:    Home Devices/Therapy: (P)  (no home resp therapy per chart )  Past Medical History:   Diagnosis Date    Constipation     Hypertension     Osteoporosis     Parkinson disease ([facility] )      Social History     Socioeconomic History    Marital status:      Spouse name: None    Number of children: None    Years of education: None    Highest education level: None   Occupational History    None   Tobacco Use    Smoking status: Never Smoker    Smokeless tobacco: Never Used   Substance and Sexual Activity    Alcohol use: Not Currently    Drug use: No    Sexual activity: None   Other Topics Concern    None   Social History Narrative    None     Social Determinants of Health     Financial Resource Strain:     Difficulty of Paying Living Expenses:    Food Insecurity:     Worried About Running Out of Food in the Last Year:     Ran Out of Food in the Last Year:    Transportation Needs:     Lack of Transportation (Medical):      Lack of Transportation (Non-Medical):    Physical Activity:     Days of Exercise per Week:     Minutes of Exercise per Session:    Stress:     Feeling of Stress :    Social Connections:     Frequency of Communication with Friends and Family:     Frequency of Social Gatherings with Friends and Family:     Attends Jew Services:     Active Member of Clubs or Organizations:     Attends Club or Organization Meetings:     Marital Status:    Intimate Partner Violence:     Fear of Current or Ex-Partner:     Emotionally Abused:     Physically Abused:     Sexually Abused: Subjective         Objective    Physical Exam:   Assessment Type: (P) Assess only  General Appearance: (P) Awake  Respiratory Pattern: (P) Normal  Chest Assessment: (P) Chest expansion symmetrical  Bilateral Breath Sounds: (P) Clear, Diminished  Cough: (P) None  O2 Device: (P) ra     Vitals:  Blood pressure 155/74, pulse 90, temperature 97 9 °F (36 6 °C), temperature source Temporal, resp  rate (P) 20, weight 33 4 kg (73 lb 10 1 oz), SpO2 98 %, not currently breastfeeding  Imaging and other studies: I have personally reviewed pertinent reports        O2 Device: (P) ra      Plan    Respiratory Plan: (P) Discontinue Protocol        Resp Comments: (P) pt assessed for resp protocol, no home resp therapy no pulm hx no indication for nebs due to covid, no apparent distress will cont to monitor pt

## 2021-08-11 NOTE — ASSESSMENT & PLAN NOTE
· Present on admission, known sick contacts within the family, unvaccinated  · Admit on mild pathway  · Vitamin-D, zinc, vitamin-C x7 days and multivitamin thereafter  · Heparin subQ DVT prophylaxis  · Hold on remdesivir, pending ability to speak to family  · No longer hypoxic, hold on initiating steroids  · Continue ceftriaxone daily, pending procalcitonin  · D-dimer 0 94, troponin 0 13,  1 2, ESR 79, BNP 2460  · No leukocytosis or leukopenia, repeat labs in morning  · MARY, treatment as below

## 2021-08-11 NOTE — SPEECH THERAPY NOTE
Orders received  Spoke with RN  Patient is not appropriate for po at this time  If patients mental status improves consider repeat RN dysphagia screen  Will f/u as indicated       DAMASO Mercer S , [address] [facility]  Speech Language Pathologist   Available via [address] [place] [address] #[id number]  [place] #[id number]

## 2021-08-11 NOTE — ASSESSMENT & PLAN NOTE
· Continue home medications: Neupro patch non-formulary (continue if family able to provide), Stalevo non-formulary (substitute sinemet  mg BID), Nameda 28 mg ER non-formulary (substitute Namenda 10 mg BID)  · Monitor for worsening symptoms/behaviors

## 2021-08-11 NOTE — PROGRESS NOTES
R top foot  0 7x0 5cm unstagable  R heel 2 3x1 9 unstagable  L top foot 1x1 3 unstagable  L bottom arch 1 9x2 unstangable  L heel 3x1 5 with drainage umstagable

## 2021-08-11 NOTE — QUICK NOTE
Had discussion with patient's daughter in regards to goals of care  Did discuss that patient is not taking medications by mouth at this time next steps would be a tube says that she can get these medications  Patient's daughter stated she would not want that  Also discussed code status with patient's daughter and she said that she has level 3 DNR/DNI and does not want any resuscitation or intubation    Code status updated

## 2021-08-11 NOTE — PROGRESS NOTES
[facility]  Progress Note - [address] 1934, 80 y o  female MRN: [id number]  Unit/Bed#: FT 02 Encounter: [id number]  Primary Care Provider: Jennifer Bella MD   Date and time admitted to hospital: 8/10/2021  5:02 PM    * COVID-19  Assessment & Plan  · Present on admission, known sick contacts within the family, unvaccinated  · Admit on mild pathway  · Vitamin-D, zinc, vitamin-C x7 days and multivitamin thereafter  · Heparin subQ DVT prophylaxis  · Continue to hold on remdesivir, daughter agreeable if indicated  · Still on room air  · Continue ceftriaxone  · D-dimer 0 94, troponin 0 13,  1 2, ESR 79, BNP 2460  · Continue to monitor  · Attempted to call family multiple times  Will again try and call later  Will discuss goals of care with family given patient's poor functional status at this time  Acute respiratory failure with hypoxia (HCC)  Assessment & Plan  · Per ER, slightly hypoxic, now maintaining mid 90s on room air  · In the setting of COVID-19 pneumonia, will monitor closely  · O2 supplement as needed to maintain > 90%  · Monitor closely in the setting of IV fluids for hypernatremia    Hypernatremia  Assessment & Plan  · Suspect due to poor oral intake, hypernatremia 159 on admit  · Continue D5W  · Recheck sodium tomorrow    Severe protein-calorie malnutrition (HCC)  Assessment & Plan  Malnutrition Findings:           BMI Findings: Body mass index is 14 87 kg/m²     · Chronic, BMI < 15  · Consult nutrition  · Consider calorie count    Parkinson's disease ([facility] )  Assessment & Plan  · Continue home medications: Neupro patch non-formulary (continue if family able to provide), Stalevo non-formulary (substitute sinemet  mg BID), Nameda 28 mg ER non-formulary (substitute Namenda 10 mg BID)  · Monitor for worsening symptoms/behaviors    Hypothyroid  Assessment & Plan  · Continue levothyroxine 50 mcg daily    MARY (acute kidney injury) [facility])  Assessment & Plan  · Present on admission, suspect prerenal in the setting of poor oral intake secondary to COVID  · Urinary retention protocol, monitor I/Os   · Improving at this time        VTE Pharmacologic Prophylaxis: VTE Score: 4 Moderate Risk (Score 3-4) - Pharmacological DVT Prophylaxis Ordered: heparin  Patient Centered Rounds: I performed bedside rounds with nursing staff today  Discussions with Specialists or Other Care Team Provider: none    Education and Discussions with Family / Patient: Attempted to call family multiple times with no answer  Will continue to reach out  Time Spent for Care: 30 minutes  More than 50% of total time spent on counseling and coordination of care as described above  Current Length of Stay: 1 day(s)  Current Patient Status: Inpatient   Certification Statement: The patient will continue to require additional inpatient hospital stay due to COVID pneumonia  Discharge Plan: Anticipate discharge in 24-48 hrs to discharge location to be determined pending rehab evaluations  Code Status: Level 1 - Full Code    Subjective:   Patient resting comfortably on examination  Patient is nonverbal at baseline when reviewing notes  Patient did not appear in any acute distress    Objective:     Vitals:   Temp (24hrs), Av 9 °F (36 6 °C), Min:97 9 °F (36 6 °C), Max:97 9 °F (36 6 °C)    Temp:  [97 9 °F (36 6 °C)] 97 9 °F (36 6 °C)  HR:  [80-90] 82  Resp:  [20-22] 21  BP: ()/(57-89) 135/75  SpO2:  [89 %-99 %] 95 %  Body mass index is 14 87 kg/m²  Input and Output Summary (last 24 hours): Intake/Output Summary (Last 24 hours) at 2021 1415  Last data filed at 2021 0529  Gross per 24 hour   Intake 1030 ml   Output --   Net 1030 ml       Physical Exam:   Physical Exam  Vitals and nursing note reviewed  Constitutional:       General: She is not in acute distress  Appearance: She is well-developed        Comments: Cachectic   HENT:      Head: Normocephalic and atraumatic  Eyes:      General: No scleral icterus  Conjunctiva/sclera: Conjunctivae normal    Cardiovascular:      Rate and Rhythm: Normal rate and regular rhythm  Heart sounds: Normal heart sounds  No murmur heard  No friction rub  No gallop  Pulmonary:      Effort: Pulmonary effort is normal  No respiratory distress  Breath sounds: Normal breath sounds  No wheezing or rales  Abdominal:      General: Bowel sounds are normal  There is no distension  Palpations: Abdomen is soft  Tenderness: There is no abdominal tenderness  Musculoskeletal:         General: Normal range of motion  Skin:     General: Skin is warm  Findings: No rash  Neurological:      Mental Status: She is alert  Comments: Nonverbal          Additional Data:     Labs:  Results from last 7 days   Lab Units 08/11/21  0530 08/10/21  1748   WBC Thousand/uL 6 04 6 85   HEMOGLOBIN g/dL 10 5* 12 4   HEMATOCRIT % 34 5* 41 1   PLATELETS Thousands/uL 141* 153   NEUTROS PCT %  --  92*   LYMPHS PCT %  --  5*   MONOS PCT %  --  3*   EOS PCT %  --  0     Results from last 7 days   Lab Units 08/11/21  0530 08/10/21  1748   SODIUM mmol/L 161* 159*   POTASSIUM mmol/L 3 3* 3 8   CHLORIDE mmol/L 122* 118*   CO2 mmol/L 26 28   BUN mg/dL 44* 48*   CREATININE mg/dL 1 18 1 56*   ANION GAP mmol/L 13 13   CALCIUM mg/dL 8 1* 8 6   ALBUMIN g/dL  --  2 7*   TOTAL BILIRUBIN mg/dL  --  0 53   ALK PHOS U/L  --  77   ALT U/L  --  32   AST U/L  --  42   GLUCOSE RANDOM mg/dL 79 113         Results from last 7 days   Lab Units 08/11/21  1206   POC GLUCOSE mg/dl 134         Results from last 7 days   Lab Units 08/11/21  0530 08/10/21  2000   PROCALCITONIN ng/ml 1 20* 1 33*       Lines/Drains:  Invasive Devices     Peripheral Intravenous Line            Peripheral IV 08/10/21 Left Forearm <1 day                      Imaging: No pertinent imaging reviewed      Recent Cultures (last 7 days):   Results from last 7 days   Lab Units 08/10/21  2000   BLOOD CULTURE  Received in Microbiology Lab  Culture in Progress  Received in Microbiology Lab  Culture in Progress  Last 24 Hours Medication List:   Current Facility-Administered Medications   Medication Dose Route Frequency Provider Last Rate    acetaminophen  650 mg Oral Q6H PRN Gaviota Gan PA-C      aluminum-magnesium hydroxide-simethicone  30 mL Oral Q6H PRN Gaviota Gan PA-C      ascorbic acid  1,000 mg Oral Q12H [facility] Hilary Segovia PA-C      aspirin  81 mg Oral Daily Hilary Segovia PA-C      carbidopa-levodopa  1 tablet Oral BID Hilary Segovia PA-C      cefTRIAXone  2,000 mg Intravenous Q24H Hilary Segovia PA-C      cholecalciferol  2,000 Units Oral Daily Gaviota Gan PA-C      dextrose  75 mL/hr Intravenous Continuous Gaviota Gan PA-C 75 mL/hr (08/11/21 0529)    ezetimibe  10 mg Oral HS Gaviota Gan PA-C      heparin (porcine)  5,000 Units Subcutaneous [facility] Gaviota Gan PA-C      levothyroxine  50 mcg Oral Early Morning Gaviota Gan PA-C      memantine  10 mg Oral BID Hilary Segovia PA-C      zinc sulfate  220 mg Oral Daily Hilary Segovia PA-C      Followed by   Carver Claude ON 8/18/2021] multivitamin-minerals  1 tablet Oral Daily Hilary Segovia PA-C      ondansetron  4 mg Intravenous Q6H PRN Hilary Segovia PA-C      pantoprazole  40 mg Oral Early Morning Hilary Segovia PA-C      polyethylene glycol  17 g Oral Daily PRN Gaviota Gan PA-C      rotigotine  1 patch Transdermal Daily Gaviota Gan PA-C          Today, Patient Was Seen By: Marjorie Em DO    **Please Note: This note may have been constructed using a voice recognition system  **

## 2021-08-11 NOTE — H&P
[facility] H&P- [address] 1934, 80 y o  female MRN: [id number]  Unit/Bed#: FT 02 Encounter: [id number]  Primary Care Provider: Marc Hawkins MD   Date and time admitted to hospital: 8/10/2021  5:02 PM    Failed dysphagia screen:  Will make NPO and consult SLP    * COVID-19  Assessment & Plan  · Present on admission, known sick contacts within the family, unvaccinated  · Admit on mild pathway  · Vitamin-D, zinc, vitamin-C x7 days and multivitamin thereafter  · Heparin subQ DVT prophylaxis  · Hold on remdesivir for now, daughter agreeable if indicated  · No longer hypoxic, hold on initiating steroids  · Continue ceftriaxone daily, pending procalcitonin  · D-dimer 0 94, troponin 0 13,  1 2, ESR 79, BNP 2460  · No leukocytosis or leukopenia, repeat labs in morning  · MARY, treatment as below    Acute respiratory failure with hypoxia (HCC)  Assessment & Plan  · Per ER, slightly hypoxic, now maintaining mid 90s on room air  · In the setting of COVID-19 pneumonia, will monitor closely  · O2 supplement as needed to maintain > 90%  · Monitor closely in the setting of IV fluids for hypernatremia    Hypernatremia  Assessment & Plan  · Suspect due to poor oral intake, hypernatremia 159 on admit  · Status post fluid bolus in the ER, recheck BMP later this evening  · If no significant improvement, will initiate D5 infusion    MARY (acute kidney injury) ([facility] [address] )  Assessment & Plan  · Present on admission, suspect prerenal in the setting of poor oral intake secondary to COVID  · Status post fluid bolus in ER, recheck BMP later on this evening  · Consider initiation of IV fluid as above  · Urinary retention protocol, monitor I/Os   · Consider nephrology consult if no significant improvement    Severe protein-calorie malnutrition ([facility] [address] )  Assessment & Plan  Malnutrition Findings:           BMI Findings: Body mass index is 14 87 kg/m²     · Chronic, BMI < 15  · Consult nutrition  · Consider calorie count    Parkinson's disease ([facility] [address] )  Assessment & Plan  · Continue home medications: Neupro patch non-formulary (continue if family able to provide), Stalevo non-formulary (substitute sinemet  mg BID), Nameda 28 mg ER non-formulary (substitute Namenda 10 mg BID)  · Monitor for worsening symptoms/behaviors    Hypothyroid  Assessment & Plan  · Continue levothyroxine 50 mcg daily    VTE Pharmacologic Prophylaxis: VTE Score: 4 Moderate Risk (Score 3-4) - Pharmacological DVT Prophylaxis Ordered: heparin  Code Status: Prior LEVEL 3, confirmed by daughter  Discussion with family: Updated  (daughter) via phone  Anticipated Length of Stay: Patient will be admitted on an inpatient basis with an anticipated length of stay of greater than 2 midnights secondary to COVID-19 viral illness, hypoxia, MARY, hypernatremia  Total Time for Visit, including Counseling / Coordination of Care: 30 minutes Greater than 50% of this total time spent on direct patient counseling and coordination of care  Chief Complaint: COVID exposure, now with decreased oral intake and fever at home for 3 days     History of Present Illness:  Jennie Joel is a 80 y o  female with a PMH of Parkinson disease, hypothyroidism, osteoporosis who presents with decreased oral intake and fever with confirmed COVID exposure at home through daughter  Patient is not able to answer any questions appropriately and history gathered from chart review  Per daughter, patient has pretty significant Parkinson's and dementia, not very verbal at baseline  For the past 3 days she has not been eating very well or hydrating  Has not appeared to be in respiratory distress at home      Review of Systems:  Review of Systems   Unable to perform ROS: Dementia       Past Medical and Surgical History:   Past Medical History:   Diagnosis Date    Constipation     Hypertension     Osteoporosis     Parkinson disease ([facility] [address] ) Past Surgical History:   Procedure Laterality Date    BASAL CELL CARCINOMA EXCISION      above lip    BREAST CYST EXCISION      COLONOSCOPY      HYSTERECTOMY      ROTATOR CUFF REPAIR Right        Meds/Allergies:  Prior to Admission medications    Medication Sig Start Date End Date Taking? Authorizing Provider   candesartan (ATACAND) 8 MG tablet Take 4 mg by mouth daily  10/8/20 10/8/21 Yes Historical Provider, MD   carbidopa-levodopa-entacapone (STALEVO) 18 75- MG per tablet Take 1 tablet by mouth 2 (two) times a day 10/24/20  Yes Carmencita Mae MD   levothyroxine 50 mcg tablet  1/11/18  Yes Historical Provider, MD   Memantine HCl ER 28 MG CP24 Take 14 mg by mouth daily 4/2/19  Yes Historical Provider, MD   alendronate (FOSAMAX) 70 mg tablet Take 1 tablet by mouth once a week  Patient not taking: Reported on 8/10/2021    Historical Provider, MD   aspirin (ASPIR-LOW) 81 mg EC tablet Take 1 tablet by mouth daily  Patient not taking: Reported on 8/10/2021 2/24/15   Historical Provider, MD   hydrocortisone (ANUSOL-HC) 2 5 % rectal cream Apply topically 2 (two) times a day  Patient not taking: Reported on 8/10/2021 10/24/20   Carmencita Mae MD   losartan (COZAAR) 50 mg tablet Take 50 mg by mouth daily  Patient not taking: Reported on 8/10/2021 1/11/18   Historical Provider, MD   pantoprazole (PROTONIX) 40 mg tablet Take 1 tablet (40 mg total) by mouth daily  Patient not taking: Reported on 8/10/2021 10/24/20   Carmencita Mae MD   polyethylene glycol (MIRALAX) 17 g packet Take 17 g by mouth daily  Patient not taking: Reported on 8/10/2021 10/25/20   Carmencita Mae MD   rotigotine (NEUPRO) 1 MG/24HR transdermal patch Place 1 patch on the skin daily  Patient not taking: Reported on 8/10/2021 4/2/19   Historical Provider, MD   ZETIA 10 MG tablet  2/13/18   Historical ProviderMD GRAY have reviewed home medications using recent Epic encounter      Allergies: No Known Allergies    Social History:  Marital Status:    Occupation: na  Patient Pre-hospital Living Situation: Home, With other family member: daughter  Patient Pre-hospital Level of Mobility: unable to be assessed at time of evaluation  Patient Pre-hospital Diet Restrictions: unknown   Substance Use History:   Social History     Substance and Sexual Activity   Alcohol Use Not Currently     Social History     Tobacco Use   Smoking Status Never Smoker   Smokeless Tobacco Never Used     Social History     Substance and Sexual Activity   Drug Use No       Family History:  Family History   Problem Relation Age of Onset    Heart attack Mother        Physical Exam:     Vitals:   Blood Pressure: 155/74 (08/10/21 2130)  Pulse: 90 (08/10/21 2130)  Temperature: 97 9 °F (36 6 °C) (08/10/21 1651)  Temp Source: Temporal (08/10/21 1651)  Respirations: 20 (08/10/21 2130)  Weight - Scale: 33 4 kg (73 lb 10 1 oz) (08/10/21 1706)  SpO2: 98 % (08/10/21 2130)    Physical Exam  Vitals and nursing note reviewed  Constitutional:       General: She is not in acute distress  Appearance: She is ill-appearing  She is not toxic-appearing  HENT:      Head: Normocephalic and atraumatic  Cardiovascular:      Rate and Rhythm: Normal rate and regular rhythm  Pulmonary:      Effort: Pulmonary effort is normal  No respiratory distress  Breath sounds: Rales (bilat bases) present  Musculoskeletal:      Right lower leg: No edema  Left lower leg: No edema  Skin:     General: Skin is warm and dry  Coloration: Skin is not pale  Neurological:      Mental Status: She is disoriented             Additional Data:     Lab Results:  Results from last 7 days   Lab Units 08/10/21  1748   WBC Thousand/uL 6 85   HEMOGLOBIN g/dL 12 4   HEMATOCRIT % 41 1   PLATELETS Thousands/uL 153   NEUTROS PCT % 92*   LYMPHS PCT % 5*   MONOS PCT % 3*   EOS PCT % 0     Results from last 7 days   Lab Units 08/10/21  1748   SODIUM mmol/L 159*   POTASSIUM mmol/L 3 8   CHLORIDE mmol/L 118*   CO2 mmol/L 28   BUN mg/dL 48*   CREATININE mg/dL 1 56*   ANION GAP mmol/L 13   CALCIUM mg/dL 8 6   ALBUMIN g/dL 2 7*   TOTAL BILIRUBIN mg/dL 0 53   ALK PHOS U/L 77   ALT U/L 32   AST U/L 42   GLUCOSE RANDOM mg/dL 113                       Imaging: Personally reviewed the following imaging: chest xray no infiltrates  XR chest 1 view portable    (Results Pending)       EKG and Other Studies Reviewed on Admission:   · Telemetry: NSR  HR 90s  ** Please Note: This note has been constructed using a voice recognition system   **

## 2021-08-11 NOTE — ASSESSMENT & PLAN NOTE
· Suspect due to poor oral intake, hypernatremia 159 on admit  · Status post fluid bolus in the ER, recheck BMP later this evening  · If no significant improvement, will initiate D5 infusion

## 2021-08-11 NOTE — QUICK NOTE
QUICK NOTE - Deterioration Index  Caryle Purser 80 y o  female MRN: [id number]  Unit/Bed#: FT 02 Encounter: [id number]      Time Paged:   Room #: ED FT 02  Primary RN: Marquita Trejo RN   Arrival Time:   Deterioration index score at time of page: 77 4    PROBLEMS:    DI alert triggered for GCS of 6     Discussed with nursing staff  The patient has a history of Parkinson's and dementia and is not verbal at baseline   Patient is currently in no acute distress, she is on RA and is hemodynamically stable    PLAN:     Continue current management     HPI Statement (Background):   Caryle Purser is a 80y o  year old female who presents with COVID 19 infection        Historical Information   Past Medical History:   Diagnosis Date    Constipation     Hypertension     Osteoporosis     Parkinson disease ([address] )      Past Surgical History:   Procedure Laterality Date    BASAL CELL CARCINOMA EXCISION      above lip    BREAST CYST EXCISION      COLONOSCOPY      HYSTERECTOMY      ROTATOR CUFF REPAIR Right        Vitals:   Vitals:    08/10/21 1716 08/10/21 1800 08/10/21 2000 08/10/21 2130   BP:  134/89 154/72 155/74   BP Location:   Right arm Right arm   Pulse:  85 85 90   Resp:  20 20 20   Temp:       TempSrc:       SpO2: 95% 95% 99% 98%   Weight:           Temperature: Temp (24hrs), Av 9 °F (36 6 °C), Min:97 9 °F (36 6 °C), Max:97 9 °F (36 6 °C)  Current: Temperature: 97 9 °F (36 6 °C)    DIAGNOSTIC DATA:    Labs:   Results from last 7 days   Lab Units 08/10/21  1748   WBC Thousand/uL 6 85   HEMOGLOBIN g/dL 12 4   HEMATOCRIT % 41 1   PLATELETS Thousands/uL 153   NEUTROS PCT % 92*   MONOS PCT % 3*     Results from last 7 days   Lab Units 08/10/21  1748   SODIUM mmol/L 159*   POTASSIUM mmol/L 3 8   CHLORIDE mmol/L 118*   CO2 mmol/L 28   BUN mg/dL 48*   CREATININE mg/dL 1 56*   CALCIUM mg/dL 8 6   ALK PHOS U/L 77   ALT U/L 32   AST U/L 42                      Results from last 7 days   Lab Units 08/10/21  1748   TROPONIN I ng/mL 0 13*             No results found for: [facility]             Applicable Imaging Studies:   XR chest 1 view portable    (Results Pending)       Code Status: Level 1 - Full Code

## 2021-08-11 NOTE — ASSESSMENT & PLAN NOTE
· Present on admission, suspect prerenal in the setting of poor oral intake secondary to COVID  · Status post fluid bolus in ER, recheck BMP later on this evening  · Consider initiation of IV fluid infusion  · Urinary retention protocol, monitor I/Os   · Consider nephrology consult if no significant improvement

## 2021-08-11 NOTE — ASSESSMENT & PLAN NOTE
· Present on admission, suspect prerenal in the setting of poor oral intake secondary to COVID  · Urinary retention protocol, monitor I/Os   · Improving at this time

## 2021-08-11 NOTE — ASSESSMENT & PLAN NOTE
· Suspect due to poor oral intake, hypernatremia 159 on admit  · Continue D5W  · Recheck sodium tomorrow

## 2021-08-11 NOTE — ASSESSMENT & PLAN NOTE
· Present on admission, known sick contacts within the family, unvaccinated  · Admit on mild pathway  · Vitamin-D, zinc, vitamin-C x7 days and multivitamin thereafter  · Heparin subQ DVT prophylaxis  · Continue to hold on remdesivir, daughter agreeable if indicated  · Still on room air  · Continue ceftriaxone  · D-dimer 0 94, troponin 0 13,  1 2, ESR 79, BNP 2460  · Continue to monitor  · Attempted to call family multiple times  Will again try and call later  Will discuss goals of care with family given patient's poor functional status at this time

## 2021-08-11 NOTE — ASSESSMENT & PLAN NOTE
Malnutrition Findings:           BMI Findings: Body mass index is 14 87 kg/m²     · Chronic, BMI < 15  · Consult nutrition  · Consider calorie count

## 2021-08-11 NOTE — PHYSICAL THERAPY NOTE
Physical Therapy Cancellation Note    PT order received  Chart review performed  At this time, PT evaluation cancelled secondary to patient with elevated sodium  PT will follow and evaluate as appropriate       08/11/21 0742   PT Last Visit   PT Visit Date 08/11/21   Note Type   Note type Evaluation   Cancel Reasons Medical status  (elevated sodium)     Ciera Miguel, PT, DPT

## 2021-08-12 NOTE — PROGRESS NOTES
[facility]  Progress Note - [address] 1934, 80 y o  female MRN: [id number]  Unit/Bed#: -Reynaldo Encounter: [id number]  Primary Care Provider: Lor Gates MD   Date and time admitted to hospital: 8/10/2021  5:02 PM    * COVID-19  Assessment & Plan  · Present on admission, known sick contacts within the family, unvaccinated  · Admit on mild pathway  · Vitamin-D, zinc, vitamin-C x7 days and multivitamin thereafter  · Heparin subQ DVT prophylaxis  · Continue to hold on remdesivir, daughter agreeable if indicated  · Patient on 2 L nasal cannula  · Continue ceftriaxone procalcitonin 1 3/1 2  · D-dimer increased to 1 12, troponin 0 19, , ESR 79, BNP 2460  · Continue to monitor  · Goals of care discussion was held on 08/11 it was discussed that patient is unable to tolerate p o  Safely and family is not interested in PEG tube placement  Patient's code status was updated to DNR/DNI  Palliative care consult was placed  Will continue ongoing goals of care discussion with family    Acute respiratory failure with hypoxia ([facility] [address] )  Assessment & Plan  · Per ER, hypoxia with pulse oximetry less than 92% and tachypenia, now maintaining mid 90s on room air  · In the setting of COVID-19 pneumonia, will monitor closely  · O2 supplement as needed to maintain > 90%  · Monitor closely in the setting of IV fluids for hypernatremia    Hypernatremia  Assessment & Plan  · Suspect due to poor oral intake, hypernatremia 159 on admit  · Continue D5W  · Sodium 153    Severe protein-calorie malnutrition (HCC)  Assessment & Plan  Malnutrition Findings:   Adult Malnutrition type: Chronic illness  Adult Degree of Malnutrition: Other severe protein calorie malnutrition    BMI Findings:  Adult BMI Classifications: Underweight < 18 5     Body mass index is 14 87 kg/m²     · Chronic, BMI < 15  · Consult nutrition  · Consider calorie count    Parkinson's disease ([facility] [address] )  Assessment & Plan  · Continue home medications: Neupro patch non-formulary (continue if family able to provide), Stalevo non-formulary (substitute sinemet  mg BID), Nameda 28 mg ER non-formulary (substitute Namenda 10 mg BID)  · Monitor for worsening symptoms/behaviors    Hypothyroid  Assessment & Plan  · Continue levothyroxine 50 mcg daily    MARY (acute kidney injury) ([facility] [address] )  Assessment & Plan  · Present on admission, suspect prerenal in the setting of poor oral intake secondary to COVID  · Urinary retention protocol, monitor I/Os   · Improving at this time  · Resolved with IV fluids          VTE Pharmacologic Prophylaxis: VTE Score: 4 Moderate Risk (Score 3-4) - Pharmacological DVT Prophylaxis Ordered: heparin  Patient Centered Rounds: I performed bedside rounds with nursing staff today  Discussions with Specialists or Other Care Team Provider:  Discussed with case management primary RN red previous provider's notes    Education and Discussions with Family / Patient: Updated  (daughter) via phone  Time Spent for Care: 20 minutes  More than 50% of total time spent on counseling and coordination of care as described above  Current Length of Stay: 2 day(s)  Current Patient Status: Inpatient   Certification Statement: The patient will continue to require additional inpatient hospital stay due to Management of COVID  Discharge Plan: Anticipate discharge in 48-72 hrs to discharge location to be determined pending rehab evaluations  Code Status: Level 3 - DNAR and DNI    Subjective:   Resting comfortably in bed, fatigued denies shortness of breath emotional support provided    Objective:     Vitals:   Temp (24hrs), Av 2 °F (37 9 °C), Min:98 4 °F (36 9 °C), Max:101 °F (38 3 °C)    Temp:  [98 4 °F (36 9 °C)-101 °F (38 3 °C)] 98 4 °F (36 9 °C)  HR:  [66-86] 66  Resp:  [20-21] 20  BP: (118-135)/(59-75) 118/59  SpO2:  [95 %-100 %] 100 %  Body mass index is 14 87 kg/m²       Input and Output Summary (last 24 hours): Intake/Output Summary (Last 24 hours) at 8/12/2021 1041  Last data filed at 8/12/2021 0549  Gross per 24 hour   Intake 1000 ml   Output --   Net 1000 ml       Physical Exam:   Physical Exam  Vitals and nursing note reviewed  Cardiovascular:      Rate and Rhythm: Normal rate  Pulses: Normal pulses  Pulmonary:      Breath sounds: Normal breath sounds  Abdominal:      Palpations: Abdomen is soft  Musculoskeletal:         General: Normal range of motion  Cervical back: Normal range of motion  Skin:     General: Skin is warm  Neurological:      General: No focal deficit present  Mental Status: She is alert and oriented to person, place, and time  Mental status is at baseline  Psychiatric:         Mood and Affect: Mood normal          Thought Content:  Thought content normal          Judgment: Judgment normal         Additional Data:     Labs:  Results from last 7 days   Lab Units 08/12/21  0553 08/10/21  1748   WBC Thousand/uL 5 58 6 85   HEMOGLOBIN g/dL 11 0* 12 4   HEMATOCRIT % 37 2 41 1   PLATELETS Thousands/uL 132* 153   NEUTROS PCT %  --  92*   LYMPHS PCT %  --  5*   MONOS PCT %  --  3*   EOS PCT %  --  0     Results from last 7 days   Lab Units 08/12/21  0553 08/10/21  1748   SODIUM mmol/L 153* 159*   POTASSIUM mmol/L 3 3* 3 8   CHLORIDE mmol/L 117* 118*   CO2 mmol/L 27 28   BUN mg/dL 37* 48*   CREATININE mg/dL 1 23 1 56*   ANION GAP mmol/L 9 13   CALCIUM mg/dL 7 8* 8 6   ALBUMIN g/dL  --  2 7*   TOTAL BILIRUBIN mg/dL  --  0 53   ALK PHOS U/L  --  77   ALT U/L  --  32   AST U/L  --  42   GLUCOSE RANDOM mg/dL 132 113         Results from last 7 days   Lab Units 08/12/21  0745 08/12/21  0555 08/12/21  0025 08/11/21  1805 08/11/21  1206   POC GLUCOSE mg/dl 136 141* 151* 113 134         Results from last 7 days   Lab Units 08/11/21  0530 08/10/21  2000   PROCALCITONIN ng/ml 1 20* 1 33*       Lines/Drains:  Invasive Devices     Peripheral Intravenous Line Peripheral IV 08/10/21 Left Forearm 1 day                      Imaging: No pertinent imaging reviewed      Recent Cultures (last 7 days):   Results from last 7 days   Lab Units 08/10/21  2000   BLOOD CULTURE  Staphylococcus aureus*   GRAM STAIN RESULT  Gram positive cocci in clusters*  Gram positive cocci in clusters*  Gram positive cocci in clusters*       Last 24 Hours Medication List:   Current Facility-Administered Medications   Medication Dose Route Frequency Provider Last Rate    acetaminophen  325 mg Rectal Q4H PRN Diane Barahona PA-C      acetaminophen  650 mg Oral Q6H PRN DAREK Kim-LÓPEZ      aluminum-magnesium hydroxide-simethicone  30 mL Oral Q6H PRN Anshul Ramos PA-C      ascorbic acid  1,000 mg Oral Q12H [facility] Hilary Segovia PA-C      aspirin  81 mg Oral Daily Hilary Segovia PA-C      carbidopa-levodopa  1 tablet Oral BID Hilary Segovia PA-C      cefTRIAXone  2,000 mg Intravenous Q24H NELI FungC 2,000 mg (08/11/21 2241)    cholecalciferol  2,000 Units Oral Daily Hilary Segovia PA-C      dextrose  75 mL/hr Intravenous Continuous DAREK Kim-C 75 mL/hr (08/12/21 0549)    ezetimibe  10 mg Oral HS Anshul Ramos PA-C      heparin (porcine)  5,000 Units Subcutaneous [facility] Anshul Ramos PA-C      levothyroxine  50 mcg Oral Early Morning Anshul Ramos PA-C      memantine  10 mg Oral BID Hilary Segovia PA-C      zinc sulfate  220 mg Oral Daily Hilary Segovia PA-C      Followed by   Millicent Razo ON 8/18/2021] multivitamin-minerals  1 tablet Oral Daily Hilary Segovia PA-C      ondansetron  4 mg Intravenous Q6H PRN DAREK Fung-LÓPEZ      pantoprazole  40 mg Oral Early Morning Hilary Segovia PA-C      polyethylene glycol  17 g Oral Daily PRN DAREK Kim-LÓPEZ      rotigotine  1 patch Transdermal Daily Anshul Ramos PA-C          Today, Patient Was Seen By: KASHMIR Bella    **Please Note: This note may have been constructed using a voice recognition system  **

## 2021-08-12 NOTE — SOCIAL WORK
Palliative LSW saw patient at the bedside today  LSW appreciates the opportunity to provide patient/family with inpatient emotional support and guidance while patient continues to receive medical attention from the medical team     Topics discussed: Patient seen outside of room due to Rosa Isela as she was having dressings changed on feet  Patient did not have any facial grimaces during care       Areas that need follow-up: support to family  Resources given: none  Others present:  [facility] Provider, Dr Griselda Calamity will continue to follow as requested by the medical team, patient, or family

## 2021-08-12 NOTE — CONSULTS
Consultation - Syed [age] y o  female MRN: [id number]  Unit/Bed#: -01 Encounter: [id number]      Assessment/Plan   Patient Active Problem List   Diagnosis    Slow transit constipation    [facility] fracture [facility]    Maxillary fracture ([facility]    Zygomatic fracture ([facility]    Fall    Facial laceration    MARY (acute kidney injury) ([facility]    Diarrhea    Memory deficit    Hypothyroid    HLD (hyperlipidemia)    Osteoporosis    Parkinson's disease ([facility]    Ambulatory dysfunction    Hematochezia    Severe protein-calorie malnutrition ([facility] )    COVID-19    Hypernatremia    Acute respiratory failure with hypoxia ([facility] )     Active issues specifically addressed today include:   - COVID PNA [mild pathway]   - Parkinson's disease   - dementia, non-verbal at baseline   - protein calorie malnutrition   - goals of care support    Assessment:  Keisha Rivera is a 80 y o  female with a PMH of Parkinson's disease, non-verbal at baseline who initially p/w fever and found with COVID PNA, admitted on mild pathway  [facility] consulted for goals of care support  Plan:  #symptom management   - per primary team    #goals of care   - spoke with patient's daughter Adenike Lewis via telephone [12:05-12:15]   - limited discussion, patient states exhausted, currently quarantined x 12 days with COVID PNA   - introduced Palliative and Supportive Care   - confirmed change in code status to Level 3, wish to continue current medical therapy   - ultimate goal for patient to return home   - no prior AD/LW completed    #psychosocial support   - emotional support provided   - resides with daughter [Eveline] + MAYELA + grandsons x 2      We appreciate the opportunity to participate in this patient's care  We will continue to follow  Please do not hesitate to contact our on-call provider through our clinic answering service at [phone] should you have acute symptom control concerns        History of Present Illness   Physician Requesting Consult: Bella Collazo MD  Reason for Consult / Principal Problem: Goals of care support  Hx and PE limited by: dementia, non-verbal  HPI: Darlene Chavarria is a 80 y o  female with a PMH of Parkinson's disease, non-verbal at baseline who initially p/w fever and found with COVID PNA, admitted on mild pathway  [facility] consulted for goals of care support  Patient unable to provide history given non-verbal baseline, dementia  History provided by review of medical records and discussion with patient's daughter  Initially presented on 08/10/2021 with fever in the setting of known confirmed COVID contact at home [daughter]  COVID PCR positive  Initial labs significant for Na 159, K 3 8, BUN/SCr 48/1 56 [eGFR 30], albumin 2 7; WBC 6 9, Hgb 12 4, Plt 153  Trop 0 13 -> 0 16 -> 0 20 -> 0 19; BNP 2460  CXR with no findings concerning for COVID PNA  BCx positive #1 S  aureus, #2 coag negative S  Aureus  UA unremarkable  Procalcitonin 1 33 -> 1 20  Inpatient consult to Palliative Care  Consult performed by: Javi Teague MD  Consult ordered by: Marjorie Em DO          Review of Systems   Unable to perform ROS: Patient nonverbal       Historical Information   Past Medical History:   Diagnosis Date    Constipation     Hypertension     Osteoporosis     Parkinson disease ([address] )      Past Surgical History:   Procedure Laterality Date    BASAL CELL CARCINOMA EXCISION      above lip    BREAST CYST EXCISION      COLONOSCOPY      HYSTERECTOMY      ROTATOR CUFF REPAIR Right      E-Cigarette/Vaping     E-Cigarette/Vaping Substances     Social History     Socioeconomic History    Marital status:       Spouse name: None    Number of children: None    Years of education: None    Highest education level: None   Occupational History    None   Tobacco Use    Smoking status: Never Smoker    Smokeless tobacco: Never Used   Substance and Sexual Activity    Alcohol use: Not Currently    Drug use: No    Sexual activity: None   Other Topics Concern    None   Social History Narrative    None     Social Determinants of Health     Financial Resource Strain:     Difficulty of Paying Living Expenses:    Food Insecurity:     Worried About Running Out of Food in the Last Year:     [address] in the Last Year:    Transportation Needs:     Lack of Transportation (Medical):      Lack of Transportation (Non-Medical):    Physical Activity:     Days of Exercise per Week:     Minutes of Exercise per Session:    Stress:     Feeling of Stress :    Social Connections:     Frequency of Communication with Friends and Family:     Frequency of Social Gatherings with Friends and Family:     Attends [address] Services:     Active Member of Clubs or Organizations:     Attends Club or Organization Meetings:     Marital Status:    Intimate Partner Violence:     Fear of Current or Ex-Partner:     Emotionally Abused:     Physically Abused:     Sexually Abused:      Family History   Problem Relation Age of Onset    Heart attack Mother        Meds/Allergies   current meds:   Current Facility-Administered Medications   Medication Dose Route Frequency    acetaminophen (TYLENOL) rectal suppository 325 mg  325 mg Rectal Q4H PRN    acetaminophen (TYLENOL) tablet 650 mg  650 mg Oral Q6H PRN    aluminum-magnesium hydroxide-simethicone (MYLANTA) oral suspension 30 mL  30 mL Oral Q6H PRN    ascorbic acid (VITAMIN C) tablet 1,000 mg  1,000 mg Oral Q12H [address]    aspirin (ECOTRIN LOW STRENGTH) EC tablet 81 mg  81 mg Oral Daily    carbidopa-levodopa (SINEMET)  mg per tablet 1 tablet  1 tablet Oral BID    cefTRIAXone (ROCEPHIN) 2,000 mg in dextrose 5 % 50 mL IVPB  2,000 mg Intravenous Q24H    cholecalciferol (VITAMIN D3) tablet 2,000 Units  2,000 Units Oral Daily    dextrose 5 % infusion  75 mL/hr Intravenous Continuous    ezetimibe (ZETIA) tablet 10 mg  10 mg Oral HS    heparin (porcine) subcutaneous injection 5,000 Units  5,000 Units Subcutaneous Q8H [address]    levothyroxine tablet 50 mcg  50 mcg Oral Early Morning    memantine (NAMENDA) tablet 10 mg  10 mg Oral BID    zinc sulfate (ZINCATE) capsule 220 mg  220 mg Oral Daily    Followed by   Sweta Shaikh ON 8/18/2021] multivitamin-minerals (CENTRUM ADULTS) tablet 1 tablet  1 tablet Oral Daily    ondansetron (ZOFRAN) injection 4 mg  4 mg Intravenous Q6H PRN    pantoprazole (PROTONIX) EC tablet 40 mg  40 mg Oral Early Morning    polyethylene glycol (MIRALAX) packet 17 g  17 g Oral Daily PRN    rotigotine (NEUPRO) 1 MG/24HR transdermal patch 1 patch  1 patch Transdermal Daily       No Known Allergies    Objective     Physical Exam  Vitals and nursing note reviewed  Constitutional:       General: She is awake  Appearance: She is cachectic  She is ill-appearing  Comments: Elderly, frail   HENT:      Head: Atraumatic  Comments: Temporal wasting     Right Ear: External ear normal       Left Ear: External ear normal    Eyes:      Comments: No gaze preference   Cardiovascular:      Rate and Rhythm: Normal rate  Pulmonary:      Effort: No tachypnea, accessory muscle usage or respiratory distress  Comments: NC in place  Neurological:      General: No focal deficit present  Mental Status: She is alert  Psychiatric:      Comments: Unable to assess         Lab Results:   I have personally reviewed pertinent labs  , CBC:   Lab Results   Component Value Date    WBC 5 58 08/12/2021    HGB 11 0 (L) 08/12/2021    HCT 37 2 08/12/2021     (H) 08/12/2021     (L) 08/12/2021    MCH 29 9 08/12/2021    MCHC 29 6 (L) 08/12/2021    RDW 14 6 08/12/2021    MPV 10 7 08/12/2021   , CMP:   Lab Results   Component Value Date    SODIUM 153 (H) 08/12/2021    K 3 3 (L) 08/12/2021     (H) 08/12/2021    CO2 27 08/12/2021    BUN 37 (H) 08/12/2021    CREATININE 1 23 08/12/2021    CALCIUM 7 8 (L) 08/12/2021    EGFR 40 08/12/2021   , PT/PTT:No results found for: PT, PTT  Imaging Studies: I have personally reviewed pertinent reports  EKG, Pathology, and Other Studies: I have personally reviewed pertinent reports  Code Status: Level 3 - DNAR and DNI  Advance Directive and Living Will:  Not on File, not previously completed  Power of :  Not Identified  POLST:  n/a    Counseling / Coordination of Care  Total floor / unit time spent today 30 minutes  Greater than 50% of total time was spent with the patient and / or family counseling and / or coordination of care  A description of the counseling / coordination of care: chart review, symptoms assessment, emotional support  Telephone discussion with patient's daughter; introduction to palliative and supportive care  Confirmed recent change in code status   Coordinated plan of care with primary care team

## 2021-08-12 NOTE — ASSESSMENT & PLAN NOTE
· Present on admission, suspect prerenal in the setting of poor oral intake secondary to COVID  · Urinary retention protocol, monitor I/Os   · Improving at this time  · Resolved with IV fluids

## 2021-08-12 NOTE — MALNUTRITION/BMI
This medical record reflects one or more clinical indicators suggestive of malnutrition  Malnutrition Findings:   Adult Malnutrition type: Chronic illness  Adult Degree of Malnutrition: Other severe protein calorie malnutrition  Malnutrition Characteristics: Muscle loss, Inadequate energy, Weight loss, Fat loss (related to inadequate energy intake as evidenced by intake <50% estimated energy needs for 5+ days, hollow supraclavicular space, hollow orbital, wasted buccal pads, low body wt  Int:Honor goals of care, initiate oral intake as medically indicated)    BMI Findings:  Adult BMI Classifications: Underweight < 18 5     Body mass index is 14 87 kg/m²  See Nutrition note dated 8/12/2021 for additional details  Completed nutrition assessment is viewable in the nutrition documentation

## 2021-08-12 NOTE — SPEECH THERAPY NOTE
Patient continues to be not appropriate for po at this time  Now DNR/DNI with palliative care  Discussed with RN and AP  Will f/u as appropriate      DAMASO Alexis S , [address] [facility], [place], operated [facility]  Speech Language Pathologist   Available via [address] [place] [address] #[id number]  [place] #[id number]

## 2021-08-12 NOTE — ASSESSMENT & PLAN NOTE
· Present on admission, known sick contacts within the family, unvaccinated  · Admit on mild pathway  · Vitamin-D, zinc, vitamin-C x7 days and multivitamin thereafter  · Heparin subQ DVT prophylaxis  · Continue to hold on remdesivir, daughter agreeable if indicated  · Patient on 2 L nasal cannula  · Continue ceftriaxone procalcitonin 1 3/1 2  · D-dimer increased to 1 12, troponin 0 19, , ESR 79, BNP 2460  · Continue to monitor  · Goals of care discussion was held on 08/11 it was discussed that patient is unable to tolerate p o  Safely and family is not interested in PEG tube placement  Patient's code status was updated to DNR/DNI  Palliative care consult was placed    Will continue ongoing goals of care discussion with family

## 2021-08-12 NOTE — ASSESSMENT & PLAN NOTE
Malnutrition Findings:   Adult Malnutrition type: Chronic illness  Adult Degree of Malnutrition: Other severe protein calorie malnutrition    BMI Findings:  Adult BMI Classifications: Underweight < 18 5     Body mass index is 14 87 kg/m²     · Chronic, BMI < 15  · Consult nutrition  · Consider calorie count

## 2021-08-13 PROBLEM — N17.9 AKI (ACUTE KIDNEY INJURY) (HCC): Status: RESOLVED | Noted: 2020-01-01 | Resolved: 2021-01-01

## 2021-08-13 PROBLEM — S81.802A MULTIPLE OPEN WOUNDS OF LEFT LOWER LEG: Status: ACTIVE | Noted: 2021-01-01

## 2021-08-13 NOTE — CASE MANAGEMENT
Case Management Assessment & Discharge Planning Note    Patient name Edmund Wilde  Location /-07 MRN [id number]  : 1934 Date 2021       Current Admission Date: 8/10/2021  Current Admission Diagnosis:  COVID-19   Patient Active Problem List   Diagnosis    Slow transit constipation    Orbital fracture ([address] )    [facility]illary fracture [facility]    Zygomatic fracture ([facility]    Fall    Facial laceration    Diarrhea    Memory deficit    Hypothyroid    HLD (hyperlipidemia)    Osteoporosis    Parkinson's disease ([facility]    Ambulatory dysfunction    Hematochezia    Severe protein-calorie malnutrition (HCC)    COVID-19    Hypernatremia    Acute respiratory failure with hypoxia (HCC)    Multiple open wounds of left lower leg    Previous Admission - Discharge Date:10/24/20   LOS (days): 3  Geometric Mean LOS (GMLOS) (days): 5 40  Days to GMLOS:2 5 Previous Discharge Diagnosis:  There are no discharge diagnoses documented for the most recent discharge  Risk of Unplanned Readmission Score  Predictive Model Details          16 (Low)  Factor Value    Calculated 2021 16:03 27% Number of active Rx orders 33    Risk of Unplanned Readmission Model 11% Latest calcium low (7 6 mg/dL)     9% Latest BUN high (27 mg/dL)     9% Diagnosis of electrolyte disorder present     8% Age 80     8% Imaging order present in last 6 months     7% Latest hemoglobin low (10 4 g/dL)     7% Number of ED visits in last six months 1     6% Number of hospitalizations in last year 1     5% Active anticoagulant Rx order present     3% Current length of stay 2 84 days     1% Active ulcer medication Rx order present         BUNDLE:      Reason for Referral:    OBJECTIVE:  Pt is a 80y o  year old , white or  [1], female with Confucianist preference of Sabianist admitted on 6/10/0011  5:02 PM  Pt is admitted to [id number] at [address] [place] [address] with complaints of COVID-19     Current admission status: Inpatient       Preferred Pharmacy:   [address] [place] [address], [address] [place] [address] Debbi [place] [address]  Phone: [phone] Fax: [phone]    Primary Care Provider: Jaime Fogn MD    Primary Insurance: MEDICARE  Secondary Insurance: [facility] SUSIE    ASSESSMENT:  Active Health Care Agents    There are no active Health Care Agents on file           Patient Information  Mental Status: Other (Comment) (obtunded)  Living Arrangements: Lives w/ Daughter (dtr-leila)           Means of Transportation  Means of Transport to [facility][de-identified] Family transport    DISCHARGE DETAILS:     Care team mtg is scheduled for 12PM Monday

## 2021-08-13 NOTE — PLAN OF CARE
Problem: Potential for Falls  Goal: Patient will remain free of falls  Description: INTERVENTIONS:  - Educate patient/family on patient safety including physical limitations  - Instruct patient to call for assistance with activity   - Consult OT/PT to assist with strengthening/mobility   - Keep Call bell within reach  - Keep bed low and locked with side rails adjusted as appropriate  - Keep care items and personal belongings within reach  - Initiate and maintain comfort rounds  - Make Fall Risk Sign visible to staff  - Offer Toileting every 2 Hours, in advance of need  - Initiate/Maintain bed alarm  - Obtain necessary fall risk management equipment:   - Apply yellow socks and bracelet for high fall risk patients  - Consider moving patient to room near nurses station  Outcome: Progressing     Problem: Prexisting or High Potential for Compromised Skin Integrity  Goal: Skin integrity is maintained or improved  Description: INTERVENTIONS:  - Identify patients at risk for skin breakdown  - Assess and monitor skin integrity  - Assess and monitor nutrition and hydration status  - Monitor labs   - Assess for incontinence   - Turn and reposition patient  - Assist with mobility/ambulation  - Relieve pressure over bony prominences  - Avoid friction and shearing  - Provide appropriate hygiene as needed including keeping skin clean and dry  - Evaluate need for skin moisturizer/barrier cream  - Collaborate with interdisciplinary team   - Patient/family teaching  - Consider wound care consult   Outcome: Progressing     Problem: Nutrition/Hydration-ADULT  Goal: Nutrient/Hydration intake appropriate for improving, restoring or maintaining nutritional needs  Description: Monitor and assess patient's nutrition/hydration status for malnutrition  Collaborate with interdisciplinary team and initiate plan and interventions as ordered  Monitor patient's weight and dietary intake as ordered or per policy   Utilize nutrition screening tool and intervene as necessary  Determine patient's food preferences and provide high-protein, high-caloric foods as appropriate  INTERVENTIONS:  - Monitor oral intake, urinary output, labs, and treatment plans  - Assess nutrition and hydration status and recommend course of action  - Evaluate amount of meals eaten  - Assist patient with eating if necessary   - Allow adequate time for meals  - Recommend/ encourage appropriate diets, oral nutritional supplements, and vitamin/mineral supplements  - Assess need for intravenous fluids  - Provide nutrition/hydration education as appropriate  - Include patient/family/caregiver in decisions related to nutrition  Outcome: Progressing     Problem: MOBILITY - ADULT  Goal: Maintain or return to baseline ADL function  Description: INTERVENTIONS:  -  Assess patient's ability to carry out ADLs; assess patient's baseline for ADL function and identify physical deficits which impact ability to perform ADLs (bathing, care of mouth/teeth, toileting, grooming, dressing, etc )  - Assess/evaluate cause of self-care deficits   - Assess range of motion  - Assess patient's mobility; develop plan if impaired  - Assess patient's need for assistive devices and provide as appropriate  - Encourage maximum independence but intervene and supervise when necessary  - Involve family in performance of ADLs  - Assess for home care needs following discharge   - Consider OT consult to assist with ADL evaluation and planning for discharge  - Provide patient education as appropriate  Outcome: Progressing  Goal: Maintains/Returns to pre admission functional level  Description: INTERVENTIONS:  - Perform BMAT or MOVE assessment daily    - Set and communicate daily mobility goal to care team and patient/family/caregiver  - Collaborate with rehabilitation services on mobility goals if consulted  - Perform Range of Motion 3 times a day  - Reposition patient every 2 hours    - Dangle patient 3 times a day  - Stand patient 2 times a day  - Ambulate patient 3 times a day  - Out of bed to chair 2 times a day   - Out of bed for meals 3 times a day  - Out of bed for toileting  - Record patient progress and toleration of activity level   Outcome: Progressing     Problem: PAIN - ADULT  Goal: Verbalizes/displays adequate comfort level or baseline comfort level  Description: Interventions:  - Encourage patient to monitor pain and request assistance  - Assess pain using appropriate pain scale  - Administer analgesics based on type and severity of pain and evaluate response  - Implement non-pharmacological measures as appropriate and evaluate response  - Consider cultural and social influences on pain and pain management  - Notify physician/advanced practitioner if interventions unsuccessful or patient reports new pain  Outcome: Progressing     Problem: INFECTION - ADULT  Goal: Absence or prevention of progression during hospitalization  Description: INTERVENTIONS:  - Assess and monitor for signs and symptoms of infection  - Monitor lab/diagnostic results  - Monitor all insertion sites, i e  indwelling lines, tubes, and drains  - Monitor endotracheal if appropriate and nasal secretions for changes in amount and color  - [place] appropriate cooling/warming therapies per order  - Administer medications as ordered  - Instruct and encourage patient and family to use good hand hygiene technique  - Identify and instruct in appropriate isolation precautions for identified infection/condition  Outcome: Progressing  Goal: Absence of fever/infection during neutropenic period  Description: INTERVENTIONS:  - Monitor WBC    Outcome: Progressing     Problem: SAFETY ADULT  Goal: Patient will remain free of falls  Description: INTERVENTIONS:  - Educate patient/family on patient safety including physical limitations  - Instruct patient to call for assistance with activity   - Consult OT/PT to assist with strengthening/mobility   - Keep Call bell barriers to discharge w/patient and caregiver  - Arrange for needed discharge resources and transportation as appropriate  - Identify discharge learning needs (meds, wound care, etc )  - Arrange for interpretive services to assist at discharge as needed  - Refer to Case Management Department for coordinating discharge planning if the patient needs post-hospital services based on physician/advanced practitioner order or complex needs related to functional status, cognitive ability, or social support system  Outcome: Progressing     Problem: Knowledge Deficit  Goal: Patient/family/caregiver demonstrates understanding of disease process, treatment plan, medications, and discharge instructions  Description: Complete learning assessment and assess knowledge base    Interventions:  - Provide teaching at level of understanding  - Provide teaching via preferred learning methods  Outcome: Progressing

## 2021-08-13 NOTE — ASSESSMENT & PLAN NOTE
Patient has several wounds on bilateral feet and calves that are chronic in nature wound care evaluated could consider podiatry consult however given patient's advanced age, dementia, poor functional status will hold off on any additional consult at this time  Continue local wound care

## 2021-08-13 NOTE — ASSESSMENT & PLAN NOTE
Malnutrition Findings:   Adult Malnutrition type: Chronic illness  Adult Degree of Malnutrition: Other severe protein calorie malnutrition    BMI Findings:  Adult BMI Classifications: Underweight < 18 5     Body mass index is 14 87 kg/m²  · Chronic, BMI < 15  · Nutrition following however patient has been so lethargic an official speech evaluation has been unable to be made given that the patient cannot even your safely complete a speech eval she certainly cannot tolerate p o  Safely given patient's lack of p o   Intake and family not being interested in pursuing a PEG tube hospice should be discussed with the family

## 2021-08-13 NOTE — WOUND OSTOMY CARE
Progress Note - Wound   Genita Grief 80 y o  female MRN: [id number]  Unit/Bed#: [facility] Encounter: [id number]      Assessment:  Wound care consulted for assessment of b/l heel and sacral wounds  Patient admitted with COVID-19 infection  History of - severe protein malnutrition, parkinsons disease, HTN, and osteoporosis  Per H&P decreased oral intake at home prior to admission  Patient seen in bed on p-500 mattress, open eyes during assessment, and is lethargic  Seen with the primary RN  Patient is frail, and dependent for care  Max assist of one with turning for the assessment  Foam wedges in use for repositioning  Incontinent of bowel and bladder - purewick in use for management  Nutrition is following along  Mid back spine- noted bony prominence -- intact blanchable pink colored skin  No redness/open wounds  1  Sacrum - evolving DTI - POA  This wound has the potential to evolve to a full thickness injury: stage 3, stage 4 or unstageable pressure injury  Wound presents as scattered, mixed irregular/well-defined shaped area - all aspects measured together  Presents as approx: 20% partial thickness moist pink/red non-blanchable tissue and 80% non-evolving intact non-blanchable maroon/purple non-blanchable skin  Edges fragile and attached, no maceration  Yanira-wound is intact with blanchable pink/hyperpigmented skin  -will recommend foam dressing to protect, directions provided if patient soils too often  2  L heel - evolving DTI - POA  This wound has the potential to evolve to a full thickness injury: stage 3, stage 4 or unstageable pressure injury  Wound also may have vascular/arterial component  Irregular shaped oval, full thickness wound  Wound bed is approx: 20% moist stringy adhered yellow slough, 40% moist red/pink non-blanchable tissue, and 40% intact non-blanchable purple skin  Edges fragile and attached, slightly rolled noted the chronicity of the wound  Yanira-wound is dry and intact     -will recommend silver alginate to manage the drainage, and offloading  3  R heel - evolving DTI - POA  This wound has the potential to evolve to a full thickness injury: stage 3, stage 4 or unstageable pressure injury  Wound also may have vascular/arterial component  Irregular shaped oval, full thickness wound  Wound bed is approx: 10% non-blanchable intact redness, 20% moist fluctuant-boggy black/brown eschar/devitalized tissue, 30% intact brown/black dry eschar, and 40% intact slightly blistered appearing non-blanchable purple colored skin  Edges fragile and attached, no maceration  Yanira-wound is dry and intact  Strong pungent foul odor noted with moderate thick brown/yellow/serosanguineous drainage  -will recommend silver alginate to manage the drainage, and offloading  4  L foot anterior/medial foot - wounds of unclear etiology  Scattered distribution/irregular shape of some of the aspects may be suggestive of vascular/aterial component  Wounds are not directly located over a bony prominence, pressure cannot be ruled out  Mixed irregular/well-defined shapes  Wounds not documented on admission  May be related to COVID-19 infection  All wounds measured together  Presents as approx: 50% dry well adhered black/brown colored eschar, and 50% non-blanchable intact purple colored skin  Edges fragile and attached, no maceration  Yanira-wounds are intact  No lifting of the edges of the eschar   -recommend to keep dry with betadine and offload  5  R foot anterior/medial foot - wound of unclear etiology  Irregular shape, may be suggestive of vascular/aterial component  Wounds are not directly located over a bony prominence, pressure cannot be ruled out  Wounds not documented on admission  May be related to COVID-19 infection  Presents as approx: 30% dry well adhered black/brown colored eschar with small amount of dry red colored well adhered scabbing, and 70% non-blanchable intact purple/dark red colored skin   Edges fragile and attached, no maceration  Yanira-wounds are intact  No lifting of the edges of the eschar or scabbing   -recommend to keep dry with betadine and offload  Due to suspected arterial/vascular involvement would recommend podiatry/vascular consults  Will recommend to keep the eschars dry and intact, and manage the drainage of the b/l heel wounds  Any further debridement or softening of the eschars/wounds needs to be done under the care of a podiatrist/vascular surgeon  No induration, fluctuance, odor, warmth, redness, or purulence noted to the above noted wounds (exceptions noted above ,+R heel (odor/fluctuance)  New dressings applied  Patient tolerated well- no s/s of non-verbal pain or discomfort observed during the encounter  Primary nurse aware of plan of care  See flow sheets for more detailed assessment findings  Will follow along  Discussed assessment findings, and plan of care/recommendations with CHERRI FORD  Plan:   1  Apply hydraguard to b/l buttocks BID and PRN for prevention and protection  2  Apply skin nourishing cream the entire skin daily for moisture  3  Turn and reposition patient every  2 hours   4  Elevate heels off of bed with pillows to offload pressure   5  Apply EHOB waffle cushion to chair when OOB, if able  6  Continue with p-500 mattress  7  Paint eschars to L medial/anterior foot and R anterior foot daily  Allow to dry  Top with 4x4 gauze  Secure dressings loosely with mikhail (can be combined in the wrapping the b/l heel wounds)  Change daily and PRN for soilage/dislodgement  8  Cleanse sacral wounds with soap and water, pat dry and apply alleyvn foam dressing  Mic dressing with T  Peel back and assess the skin every shift and as needed  Change every other day and as needed for soilage/dislodgement  If patient soils dressing too often (greater than once per shift) please d/c the dressingand use hydraguard cream TID and PRN  9   Cleanse b/l heel wounds with NSS, pat dry, and apply maxorb silver to the wound beds  Cover with ABD pads and secure the dressings loosely with mikhail wrap  Change every day and as needed for soilage/dislodgement  10  Podiatry/vascular consult   11  Wound care will follow along with patient weekly, please call or tiger text with questions and concerns  12  Apply Allevyn foam to mid back bony prominence, soren w/P, peel foam check skin integrity q-shift  Change q3d and PRN for soilage/dislodgement     Objective:    Vitals: Blood pressure (!) 85/44, pulse 63, temperature (!) 96 1 °F (35 6 °C), resp  rate 17, height 4' 11" (1 499 m), weight 33 4 kg (73 lb 10 1 oz), SpO2 98 %, not currently breastfeeding  ,Body mass index is 14 87 kg/m²  Wound 08/10/21 Pressure Injury Sacrum (Active)   Wound Image   08/13/21 0822   Wound Description Beefy red;Light purple 08/13/21 0822   Pressure Injury Stage DTPI 08/13/21 0822   Yanira-wound Assessment Dry; Intact 08/13/21 0822   Wound Length (cm) 6 cm 08/13/21 0822   Wound Width (cm) 4 cm 08/13/21 0822   Wound Depth (cm) 0 1 cm 08/13/21 0822   Wound Surface Area (cm^2) 24 cm^2 08/13/21 0822   Wound Volume (cm^3) 2 4 cm^3 08/13/21 0822   Calculated Wound Volume (cm^3) 2 4 cm^3 08/13/21 0822   Tunneling 0 cm 08/13/21 0822   Tunneling in depth located at 0 08/13/21 0822   Undermining 0 08/13/21 0822   Undermining is depth extending from 0 08/13/21 0822   Drainage Amount None 08/13/21 0822   Non-staged Wound Description Partial thickness 08/13/21 0822   Treatments Cleansed;Irrigation with NSS;Elevated 08/13/21 0310   Dressing Foam, Silicon (eg  Allevyn, etc) 08/13/21 0413   Wound packed? No 08/13/21 0822   Packing- # removed 0 08/13/21 0822   Packing- # inserted 0 08/13/21 0822   Dressing Changed New 08/13/21 0822   Patient Tolerance Tolerated well 08/13/21 0822       Wound 08/11/21 Pressure Injury Heel Right (Active)   Wound Image      08/13/21 0824   Wound Description Black;Fragile; Necrotic;Eschar;Light purple;Non-blanchable erythema 08/13/21 0817   Pressure Injury Stage DTPI 08/13/21 0817   Yanira-wound Assessment Dry; Intact 08/13/21 0817   Wound Length (cm) 7 cm 08/13/21 0817   Wound Width (cm) 7 cm 08/13/21 0817   Wound Depth (cm) 0 7 cm 08/13/21 0817   Wound Surface Area (cm^2) 49 cm^2 08/13/21 0817   Wound Volume (cm^3) 34 3 cm^3 08/13/21 0817   Calculated Wound Volume (cm^3) 34 3 cm^3 08/13/21 0817   Tunneling 0 cm 08/13/21 0817   Tunneling in depth located at 0 08/13/21 0817   Undermining 0 08/13/21 0817   Undermining is depth extending from 0 08/13/21 0817   Drainage Amount Moderate 08/13/21 0817   Drainage Description Foul smelling;Yellow;Serosanguineous; Ester Glassing; Thick 08/13/21 0817   Non-staged Wound Description Full thickness 08/13/21 0817   Treatments Cleansed;Irrigation with NSS;Elevated 08/13/21 0817   Dressing Calcium Alginate with Silver;Dry dressing 08/13/21 0817   Wound packed? No 08/13/21 0817   Packing- # removed 0 08/13/21 0817   Packing- # inserted 0 08/13/21 [id number]   Dressing Changed New 08/13/21 0817   Patient Tolerance Tolerated well 08/13/21 0817       Wound 08/13/21 Foot Anterior;Right (Active)   Wound Image   08/13/21 0818   Wound Description Dry;Beefy red;Black; Brown;Light purple;Non-blanchable erythema 08/13/21 0818   Yanira-wound Assessment Dry; Intact 08/13/21 0818   Wound Length (cm) 4 cm 08/13/21 0818   Wound Width (cm) 3 cm 08/13/21 0818   Wound Depth (cm) 0 2 cm 08/13/21 0818   Wound Surface Area (cm^2) 12 cm^2 08/13/21 0818   Wound Volume (cm^3) 2 4 cm^3 08/13/21 0818   Calculated Wound Volume (cm^3) 2 4 cm^3 08/13/21 0818   Tunneling 0 cm 08/13/21 0818   Tunneling in depth located at 0 08/13/21 0818   Undermining 0 08/13/21 0818   Undermining is depth extending from 0 08/13/21 0818   Drainage Amount None 08/13/21 0818   Non-staged Wound Description Partial thickness 08/13/21 0818   Treatments Cleansed;site Care; Elevated 08/13/21 0818   Dressing Dry dressing 08/13/21 0818   Wound packed? No 08/13/21 0818   Packing- # removed 0 08/13/21 0818   Packing- # inserted 0 08/13/21 0818   Dressing Changed New 08/13/21 0818   Patient Tolerance Tolerated well 08/13/21 0818       Wound 08/13/21 Heel Left (Active)   Wound Image        08/13/21 0820   Wound Description Beefy red;Yellow;Slough;Pink;Light purple 08/13/21 0818   Pressure Injury Stage DTPI 08/13/21 0818   Yanira-wound Assessment Dry; Intact 08/13/21 0818   Wound Length (cm) 4 cm 08/13/21 0818   Wound Width (cm) 3 cm 08/13/21 0818   Wound Depth (cm) 0 2 cm 08/13/21 0818   Wound Surface Area (cm^2) 12 cm^2 08/13/21 0818   Wound Volume (cm^3) 2 4 cm^3 08/13/21 0818   Calculated Wound Volume (cm^3) 2 4 cm^3 08/13/21 0818   Tunneling 0 cm 08/13/21 0818   Tunneling in depth located at 0 08/13/21 0818   Undermining 0 08/13/21 0818   Undermining is depth extending from 0 08/13/21 0818   Drainage Amount Small 08/13/21 0818   Drainage Description Serosanguineous; Thin 08/13/21 0818   Non-staged Wound Description Full thickness 08/13/21 0818   Treatments Cleansed;Irrigation with NSS;Elevated 08/13/21 0818   Dressing Calcium Alginate with Silver;Dry dressing 08/13/21 0818   Wound packed? No 08/13/21 0818   Packing- # removed 0 08/13/21 0818   Packing- # inserted 0 08/13/21 0818   Dressing Changed New 08/13/21 0818   Patient Tolerance Tolerated well 08/13/21 0818       Wound 08/13/21 Foot Left (Active)   Wound Image    08/13/21 0821   Wound Description Dry;Black; Brown;Eschar;Necrotic;Light purple 08/13/21 0821   Yanira-wound Assessment Dry; Intact 08/13/21 0821   Wound Length (cm) 10 cm 08/13/21 0821   Wound Width (cm) 6 cm 08/13/21 0821   Wound Depth (cm) 0 2 cm 08/13/21 0821   Wound Surface Area (cm^2) 60 cm^2 08/13/21 0821   Wound Volume (cm^3) 12 cm^3 08/13/21 0821   Calculated Wound Volume (cm^3) 12 cm^3 08/13/21 0821   Tunneling 0 cm 08/13/21 0821   Tunneling in depth located at 0 08/13/21 0821   Undermining 0 08/13/21 0821   Undermining is depth extending from 0 08/13/21 0821   Drainage Amount None 08/13/21 0821   Non-staged Wound Description Full thickness 08/13/21 0821   Treatments Cleansed;Site care; Elevated 08/13/21 0821   Dressing Dry dressing 08/13/21 0821   Wound packed? No 08/13/21 0821   Packing- # removed 0 08/13/21 0821   Packing- # inserted 0 08/13/21 0821   Dressing Changed New 08/13/21 0821   Patient Tolerance Tolerated well 08/13/21 0821         Recommendations written as orders  AVS updated    Daniela Cox RN BSN CWON

## 2021-08-13 NOTE — ASSESSMENT & PLAN NOTE
· Present on admission, known sick contacts within the family, unvaccinated  · Admit on mild pathway  · Vitamin-D, zinc, vitamin-C x7 days and multivitamin thereafter  · Heparin subQ DVT prophylaxis  · Continue to hold on remdesivir, daughter agreeable if indicated  · Patient on 1 L nasal cannula  · Continue ceftriaxone procalcitonin 1 3/1 2  · D-dimer increased to 1 12, troponin 0 19, , ESR 79, BNP 2460  · Continue to monitor  · Goals of care discussion was held on 08/11 it was discussed that patient is unable to tolerate p o  Safely and family is not interested in PEG tube placement  Patient's code status was updated to DNR/DNI  Palliative care consult was placed  Will continue ongoing goals of care discussion with family  · Patient has had little to no improvement over the last 48 hours regarding her mentation she does open her eyes to her name but she is not answered any questions and has essentially been nonverbal for last 48 hours her oxygen is stable on 1 L  Her heart rate did start to dip into the 40s this afternoon  I attempted to call the daughter I left a voicemail and called twice and have not heard back at this point I think a discussion should be had regarding hospice    · Patient is essentially bed-bound and wheelchair-bound at baseline

## 2021-08-13 NOTE — ASSESSMENT & PLAN NOTE
· Per ER, slightly hypoxic, now maintaining mid 90s 1L  · In the setting of COVID-19 pneumonia, will monitor closely  · O2 supplement as needed to maintain > 90%

## 2021-08-13 NOTE — DISCHARGE INSTR - OTHER ORDERS
Plan:   1  Apply hydraguard to b/l buttocks BID and PRN for prevention and protection  2  Apply skin nourishing cream the entire skin daily for moisture  3  Turn and reposition patient every 2 hours or as patient tolerates for comfort  4  Elevate heels off of bed with pillows to offload pressure   5  Apply EHOB waffle cushion to chair when OOB, if able  6  Continue with p-500 mattress  7  Paint eschars to L medial/anterior foot and R anterior foot daily  Allow to dry  Top with 4x4 gauze  Secure dressings loosely with mikhail (can be combined in the wrapping the b/l heel wounds)  Change every other day and PRN for soilage/dislodgement  8  Cleanse sacral wounds with soap and water, pat dry and apply alleyvn foam dressing  Soren dressing with T  Peel back and assess the skin every shift and as needed  Change every other day and as needed for soilage/dislodgement  If patient soils dressing too often (greater than once per shift) please d/c the dressing and use hydraguard cream TID and PRN  9  Cleanse b/l heel wounds with NSS, pat dry, and apply maxorb silver to the wound beds  Cover with ABD pads and secure the dressings loosely with mikhail wrap  Change every other day and as needed for soilage/dislodgement  10  Apply Allevyn foam to mid back bony prominence, soren w/P, peel foam check skin integrity q-shift   Change q3d and PRN for soilage/dislodgement

## 2021-08-13 NOTE — SPEECH THERAPY NOTE
Spoke with RN  Patient continues to be n/a for po at this time- limited eye opening, unable to follow commands or open mouth  Spoke with NP- will cancel SLP orders at this time  NP to reconsult if patients status changes      DAMASO Dunbar , [id number] [facility]  Speech Language Pathologist   Available via [address] [place] [address] #[id number]  [place] #[id number]

## 2021-08-13 NOTE — ASSESSMENT & PLAN NOTE
· Continue home medications: Neupro patch non-formulary (continue if family able to provide), Stalevo non-formulary (substitute sinemet  mg BID), Nameda 28 mg ER non-formulary (substitute Namenda 10 mg BID)

## 2021-08-13 NOTE — PLAN OF CARE
Problem: Nutrition/Hydration-ADULT  Goal: Nutrient/Hydration intake appropriate for improving, restoring or maintaining nutritional needs  Description: Monitor and assess patient's nutrition/hydration status for malnutrition  Collaborate with interdisciplinary team and initiate plan and interventions as ordered  Monitor patient's weight and dietary intake as ordered or per policy  Utilize nutrition screening tool and intervene as necessary  Determine patient's food preferences and provide high-protein, high-caloric foods as appropriate       INTERVENTIONS:  - Monitor oral intake, urinary output, labs, and treatment plans  - Assess nutrition and hydration status and recommend course of action  - Evaluate amount of meals eaten  - Assist patient with eating if necessary   - Allow adequate time for meals  - Recommend/ encourage appropriate diets, oral nutritional supplements, and vitamin/mineral supplements  - Assess need for intravenous fluids  - Provide nutrition/hydration education as appropriate  - Include patient/family/caregiver in decisions related to nutrition  Outcome: Not Progressing   No improvement in ability to take nutrition support  Honoring goals of care for no alternate nutrition

## 2021-08-13 NOTE — QUICK NOTE
Discussed goals of care and hospital course and plan moving forward with patient's other daughter Edelmira Dunlap as well as with patient's daughter Garland Green is very sick with COVID herself and is at home  Patient's daughter Edelmira Dunlap is well however lives in [place] is open to the discussion of possible hospice pending patient's clinical course over the weekend  Gary Green is very resistant to hospice and thinks that it is only done in the nursing home and that its not good   Education provided, plan for a care team meeting on Monday AT 12PM

## 2021-08-13 NOTE — PROGRESS NOTES
[facility]  Progress Note - [address] 1934, 80 y o  female MRN: [id number]  Unit/Bed#: -01 Encounter: [id number]  Primary Care Provider: Marciano Kendrick MD   Date and time admitted to hospital: 8/10/2021  5:02 PM  * COVID-19  Assessment & Plan  · Present on admission, known sick contacts within the family, unvaccinated  · Admit on mild pathway  · Vitamin-D, zinc, vitamin-C x7 days and multivitamin thereafter  · Heparin subQ DVT prophylaxis  · Continue to hold on remdesivir, daughter agreeable if indicated  · Patient on 1 L nasal cannula  · Continue ceftriaxone procalcitonin 1 3/1 2  · D-dimer increased to 1 12, troponin 0 19, , ESR 79, BNP 2460  · Continue to monitor  · Goals of care discussion was held on 08/11 it was discussed that patient is unable to tolerate p o  Safely and family is not interested in PEG tube placement  Patient's code status was updated to DNR/DNI  Palliative care consult was placed  Will continue ongoing goals of care discussion with family  · Patient has had little to no improvement over the last 48 hours regarding her mentation she does open her eyes to her name but she is not answered any questions and has essentially been nonverbal for last 48 hours her oxygen is stable on 1 L  Her heart rate did start to dip into the 40s this afternoon  I attempted to call the daughter I left a voicemail and called twice and have not heard back at this point I think a discussion should be had regarding hospice    · Patient is essentially bed-bound and wheelchair-bound at baseline    Multiple open wounds of left lower leg  Assessment & Plan  Patient has several wounds on bilateral feet and calves that are chronic in nature wound care evaluated could consider podiatry consult however given patient's advanced age, dementia, poor functional status will hold off on any additional consult at this time  Continue local wound care    Acute respiratory failure with hypoxia (HCC)  Assessment & Plan  · Per ER, slightly hypoxic, now maintaining mid 90s 1L  · In the setting of COVID-19 pneumonia, will monitor closely  · O2 supplement as needed to maintain > 90%    Hypernatremia  Assessment & Plan  · Suspect due to poor oral intake, hypernatremia 159 on admit  · Continue D5W  · Sodium 144    Severe protein-calorie malnutrition (HCC)  Assessment & Plan  Malnutrition Findings:   Adult Malnutrition type: Chronic illness  Adult Degree of Malnutrition: Other severe protein calorie malnutrition    BMI Findings:  Adult BMI Classifications: Underweight < 18 5     Body mass index is 14 87 kg/m²  · Chronic, BMI < 15  · Nutrition following however patient has been so lethargic an official speech evaluation has been unable to be made given that the patient cannot even your safely complete a speech eval she certainly cannot tolerate p o  Safely given patient's lack of p o  Intake and family not being interested in pursuing a PEG tube hospice should be discussed with the family    Parkinson's disease [facility])  Assessment & Plan  · Continue home medications: Neupro patch non-formulary (continue if family able to provide), Stalevo non-formulary (substitute sinemet  mg BID), Nameda 28 mg ER non-formulary (substitute Namenda 10 mg BID)    Hypothyroid  Assessment & Plan  · Continue levothyroxine 50 mcg daily    MARY (acute kidney injury) (HCC)-resolved as of 8/13/2021  Assessment & Plan  · Present on admission, suspect prerenal in the setting of poor oral intake secondary to COVID  · Urinary retention protocol, monitor I/Os   · Improving at this time  · Resolved with IV fluids    VTE Pharmacologic Prophylaxis: VTE Score: 4 Moderate Risk (Score 3-4) - Pharmacological DVT Prophylaxis Ordered: heparin  Patient Centered Rounds: I performed bedside rounds with nursing staff today    Discussions with Specialists or Other Care Team Provider:  Discussed with Nutrition case management and primary RN    Education and Discussions with Family / Patient: Updated  (daughter) via phone  Time Spent for Care: 45 minutes  More than 50% of total time spent on counseling and coordination of care as described above  Current Length of Stay: 3 day(s)  Current Patient Status: Inpatient   Certification Statement: The patient will continue to require additional inpatient hospital stay due to Continued oxygen support  Discharge Plan: Anticipate discharge in 48-72 hrs to home with home services  Code Status: Level 3 - DNAR and DNI    Subjective:   Patient is nonverbal, per family patient talks minimally at baseline but patient has been completely nonverbal here patient is also essentially wheelchair and bed bound at home with daughter as primary care provider attempted to have hospice conversation with patient's daughter today however she is not interested in hospice at this time    Objective:     Vitals:   Temp (24hrs), Av 5 °F (36 9 °C), Min:96 1 °F (35 6 °C), Max:101 5 °F (38 6 °C)    Temp:  [96 1 °F (35 6 °C)-101 5 °F (38 6 °C)] 96 1 °F (35 6 °C)  HR:  [53-90] 53  Resp:  [17] 17  BP: ()/(44-80) 123/55  SpO2:  [91 %-99 %] 99 %  Body mass index is 14 87 kg/m²  Input and Output Summary (last 24 hours): Intake/Output Summary (Last 24 hours) at 2021 1509  Last data filed at 2021 0610  Gross per 24 hour   Intake 1826 25 ml   Output --   Net 1826 25 ml       Physical Exam:   Physical Exam  Vitals reviewed  Constitutional:       General: She is in acute distress  Appearance: She is ill-appearing  Cardiovascular:      Rate and Rhythm: Bradycardia present  Musculoskeletal:         General: Normal range of motion  Skin:     General: Skin is warm  Neurological:      Mental Status: Mental status is at baseline  She is lethargic        Comments: Nonverbal      Additional Data:     Labs:  Results from last 7 days   Lab Units 21  0613   WBC Thousand/uL 6 14   HEMOGLOBIN g/dL 10 4*   HEMATOCRIT % 34 8   PLATELETS Thousands/uL 125*   NEUTROS PCT % 84*   LYMPHS PCT % 13*   MONOS PCT % 2*   EOS PCT % 0     Results from last 7 days   Lab Units 08/13/21  0613 08/10/21  1748   SODIUM mmol/L 144 159*   POTASSIUM mmol/L 3 0* 3 8   CHLORIDE mmol/L 109* 118*   CO2 mmol/L 26 28   BUN mg/dL 27* 48*   CREATININE mg/dL 1 15 1 56*   ANION GAP mmol/L 9 13   CALCIUM mg/dL 7 6* 8 6   ALBUMIN g/dL  --  2 7*   TOTAL BILIRUBIN mg/dL  --  0 53   ALK PHOS U/L  --  77   ALT U/L  --  32   AST U/L  --  42   GLUCOSE RANDOM mg/dL 157* 113         Results from last 7 days   Lab Units 08/13/21  1159 08/13/21  0721 08/13/21  0616 08/13/21  0225 08/12/21  2231 08/12/21  1958 08/12/21  1831 08/12/21  1202 08/12/21  0745 08/12/21  0555 08/12/21  0025 08/11/21  1805   POC GLUCOSE mg/dl 138 143* 156* 136 96 163* 53* 121 136 141* 151* 113         Results from last 7 days   Lab Units 08/11/21  0530 08/10/21  2000   PROCALCITONIN ng/ml 1 20* 1 33*       Lines/Drains:  Invasive Devices     Peripheral Intravenous Line            Peripheral IV 08/10/21 Left Forearm 2 days          Drain            External Urinary Catheter <1 day                Imaging: No pertinent imaging reviewed      Recent Cultures (last 7 days):   Results from last 7 days   Lab Units 08/10/21  2000   BLOOD CULTURE  Staphylococcus coagulase negative*  Staphylococcus aureus*   GRAM STAIN RESULT  Gram positive cocci in clusters*  Gram positive cocci in clusters*       Last 24 Hours Medication List:   Current Facility-Administered Medications   Medication Dose Route Frequency Provider Last Rate    acetaminophen  325 mg Rectal Q4H PRN Nallely Carr PA-C      acetaminophen  650 mg Oral Q6H PRN Rita Pearson PA-C      aluminum-magnesium hydroxide-simethicone  30 mL Oral Q6H PRN Rita Pearson PA-C      ascorbic acid  1,000 mg Oral Q12H [facility] Hilary Segovia PA-C      aspirin  81 mg Oral Daily Rita Pearson PA-C  carbidopa-levodopa  1 tablet Oral BID Hilary Segovia PA-C      cefTRIAXone  2,000 mg Intravenous Q24H NELI FungC 2,000 mg (08/12/21 2229)    cholecalciferol  2,000 Units Oral Daily HilaryDAREK Henry-LÓPEZ      dextrose  75 mL/hr Intravenous Continuous Valeta Héctor PA-C 75 mL/hr (08/13/21 0610)    ezetimibe  10 mg Oral HS ValDAREK Contreras-C      heparin (porcine)  5,000 Units Subcutaneous [facility] Valeta Foreign Anaya      levothyroxine  50 mcg Oral Early Morning ValForeign Contreras      memantine  10 mg Oral BID Foreign Fung      zinc sulfate  220 mg Oral Daily Hilary Segovia PA-C      Followed by   Dennie Ewings ON 8/18/2021] multivitamin-minerals  1 tablet Oral Daily Hilary Segovia PA-C      ondansetron  4 mg Intravenous Q6H PRN Hilary Segovia PA-C      pantoprazole  40 mg Oral Early Morning Hilary Segovia PA-C      polyethylene glycol  17 g Oral Daily PRN ValDAREK Contreras-C      rotigotine  1 patch Transdermal Daily Ashish Anaya PA-C          Today, Patient Was Seen By: KASHMIR [place]    **Please Note: This note may have been constructed using a voice recognition system  **

## 2021-08-14 PROBLEM — R78.81 BACTEREMIA DUE TO STAPHYLOCOCCUS: Status: ACTIVE | Noted: 2021-01-01

## 2021-08-14 PROBLEM — B95.8 BACTEREMIA DUE TO STAPHYLOCOCCUS: Status: ACTIVE | Noted: 2021-01-01

## 2021-08-14 NOTE — ASSESSMENT & PLAN NOTE
· Present on admission, known sick contacts within the family, unvaccinated  · Admit on mild pathway  · Vitamin-D, zinc, vitamin-C x7 days and multivitamin thereafter (on day 6 however pt not tolerating PO intake and not taking these meds)  · Heparin subQ DVT prophylaxis  · Continue to hold on remdesivir, daughter agreeable if indicated  · Patient on 2 L nasal cannula, tolerating well and remaining >90%  · Ceftriaxone was started and continued for 4 days due to elevated procalcitonin, blood cultures returned 8/14 and pt was switched to Ancef due to susceptibilities, repeat blood cultures pending  · D-dimer increased to 1 12, troponin 0 19, , ESR 79, BNP 2460  · Continue to monitor  · Goals of care discussion was held on 08/11 it was discussed that patient is unable to tolerate p o  Safely and family is not interested in PEG tube placement (discussed with daughter 8/14 this decision was made as it was felt this is a life prolonging measure and would not comply with the tp DNR/DNI)  Patient's code status was updated to DNR/DNI  Palliative care consult was placed  Will continue ongoing goals of care discussion with family  · 8/13: Patient has had little to no improvement over the last 48 hours regarding her mentation she does open her eyes to her name but she is not answered any questions and has essentially been nonverbal for last 48 hours her oxygen is stable on 1 L  Her heart rate did start to dip into the 40s this afternoon  · From 8/14-8/15 pt opened eyes to name and was able to answer that she was not having pain  She does respond much more alertly when spoken to in [address]  Still unable to be oriented however  Had a discussion with both pt's daughters and they are considering hospice at [facility] at this time   A final decision will be made during discussion on Monday  · Patient is essentially bed-bound and wheelchair-bound at baseline

## 2021-08-14 NOTE — ASSESSMENT & PLAN NOTE
· Per ER, slightly hypoxic, now maintaining mid 90s 2L  · In the setting of COVID-19 pneumonia, will monitor closely  · O2 supplement as needed to maintain > 90%

## 2021-08-14 NOTE — ASSESSMENT & PLAN NOTE
· Patient has several wounds on bilateral feet and calves that are chronic in nature wound care evaluated could consider podiatry consult however given patient's advanced age, dementia, poor functional status will hold off on any additional consult at this time  · Continue local wound care

## 2021-08-14 NOTE — CASE MANAGEMENT
Case Management ED Discharge Planning Note    Patient name Gi Jordan  Location /-13 MRN [id number]  : 1934 Date 2021       Previous Admission - Discharge Date:10/24/20   Previous Discharge Diagnosis:  There are no discharge diagnoses documented for the most recent discharge  BUNDLE:      OBJECTIVE:  Pt is a 80y o  year old , white or  [1], female with [employer] preference of [address] who presented to the ER at [address] [place] [address] on 8/10/2021  5:02 PM with complaints of COVID-19   Patient Class: Inpatient  Preferred Pharmacy:   [address] [place] [address], [address] [place] [id number]  Phone: [phone] Fax: [phone]    Primary Care Provider: Duane Sorenson MD    Primary Insurance: MEDICARE  Secondary Insurance: [address] [place] [address]    ED Discharge Details:                            Other Referral/Resources/Interventions Provided:  Referrals Provided[de-identified] Hospice (Jaclynn Goodell Cindi Crawford requested [facility] referral [facility] hospice is family choice )

## 2021-08-14 NOTE — ASSESSMENT & PLAN NOTE
· Suspect due to poor oral intake, hypernatremia 159 on admit  · Continue D5W  · Sodium today 136 and stable  · Continue to monitor

## 2021-08-14 NOTE — ASSESSMENT & PLAN NOTE
Malnutrition Findings:   Adult Malnutrition type: Chronic illness  Adult Degree of Malnutrition: Other severe protein calorie malnutrition    BMI Findings:  Adult BMI Classifications: Underweight < 18 5     Body mass index is 14 87 kg/m²  · Chronic, BMI < 15  · Nutrition following however patient has been so lethargic an official speech evaluation has been unable to be made given that the patient cannot even your safely complete a speech eval she certainly cannot tolerate p o  Safely given patient's lack of p o   Intake and family not being interested in pursuing a PEG tube hospice should be discussed with the family, plan to have discussion again on Monday 8/14  · Patient has been becoming intermittently hypoglycemic due to being NPO, continue to monitor this closely and utilize glucose kits as necessary, maintain on D5 in fluids

## 2021-08-14 NOTE — ASSESSMENT & PLAN NOTE
· Blood cultures returned positive from 8/10 --> One positive for S   Aureus susceptible to Ancef the second gram positive cocci, possibly skin kandi contaminate  · Reordered blood cultures x 2 but while awaiting those will switch pt from cetriaxone to AIV ancef  · Given CrCl is 22, will give one time dose of 1000 and then continue on 500 BID

## 2021-08-14 NOTE — ASSESSMENT & PLAN NOTE
· Present on admission, known sick contacts within the family, unvaccinated  · Admit on mild pathway  · Vitamin-D, zinc, vitamin-C x7 days and multivitamin thereafter (on day 5 however pt not tolerating PO intake)  · Heparin subQ DVT prophylaxis  · Continue to hold on remdesivir, daughter agreeable if indicated  · Patient on 2 L nasal cannula  · Ceftriaxone was started and continued for 4 days due to elevated procalcitonin, blood cultures returned 8/14 and pt was switched to Ancef due to susceptibilities  · D-dimer increased to 1 12, troponin 0 19, , ESR 79, BNP 2460  · Continue to monitor  · Goals of care discussion was held on 08/11 it was discussed that patient is unable to tolerate p o  Safely and family is not interested in PEG tube placement (discussed with daughter 8/14 today and she will get back to me after it is discussed further with other family)  Patient's code status was updated to DNR/DNI  Palliative care consult was placed  Will continue ongoing goals of care discussion with family  · 8/13: Patient has had little to no improvement over the last 48 hours regarding her mentation she does open her eyes to her name but she is not answered any questions and has essentially been nonverbal for last 48 hours her oxygen is stable on 1 L  Her heart rate did start to dip into the 40s this afternoon  I attempted to call the daughter I left a voicemail and called twice and have not heard back at this point I think a discussion should be had regarding hospice     · Today 8/14 pt opened eyes to name and was able to answer that she was not having pain however did not answer when asked where she was, will continue to assess her mental status  · Patient is essentially bed-bound and wheelchair-bound at baseline

## 2021-08-14 NOTE — ASSESSMENT & PLAN NOTE
Malnutrition Findings:   Adult Malnutrition type: Chronic illness  Adult Degree of Malnutrition: Other severe protein calorie malnutrition    BMI Findings:  Adult BMI Classifications: Underweight < 18 5     Body mass index is 14 87 kg/m²  · Chronic, BMI < 15  · Nutrition following however patient has been so lethargic an official speech evaluation has been unable to be made given that the patient cannot even your safely complete a speech eval she certainly cannot tolerate p o  Safely given patient's lack of p o   Intake and family not being interested in pursuing a PEG tube hospice should be discussed with the family, plan to have discussion again on Monday 8/14

## 2021-08-14 NOTE — PROGRESS NOTES
[facility]  Progress Note - [address] 1934, 80 y o  female MRN: [id number]  Unit/Bed#: -01 Encounter: [id number]  Primary Care Provider: Jimbo Person MD   Date and time admitted to hospital: 8/10/2021  5:02 PM    * COVID-19  Assessment & Plan  · Present on admission, known sick contacts within the family, unvaccinated  · Admit on mild pathway  · Vitamin-D, zinc, vitamin-C x7 days and multivitamin thereafter (on day 5 however pt not tolerating PO intake)  · Heparin subQ DVT prophylaxis  · Continue to hold on remdesivir, daughter agreeable if indicated  · Patient on 2 L nasal cannula  · Ceftriaxone was started and continued for 4 days due to elevated procalcitonin, blood cultures returned 8/14 and pt was switched to Ancef due to susceptibilities  · D-dimer increased to 1 12, troponin 0 19, , ESR 79, BNP 2460  · Continue to monitor  · Goals of care discussion was held on 08/11 it was discussed that patient is unable to tolerate p o  Safely and family is not interested in PEG tube placement (discussed with daughter 8/14 this decision was made as it was felt this is a life prolonging measure and would not comply with the tp DNR/DNI)  Patient's code status was updated to DNR/DNI  Palliative care consult was placed  Will continue ongoing goals of care discussion with family  · 8/13: Patient has had little to no improvement over the last 48 hours regarding her mentation she does open her eyes to her name but she is not answered any questions and has essentially been nonverbal for last 48 hours her oxygen is stable on 1 L  Her heart rate did start to dip into the 40s this afternoon  · Today 8/14 pt opened eyes to name and was able to answer that she was not having pain however did not answer when asked where she was, will continue to assess her mental status    Had a discussion with both pt's daughters and they are considering hospice at [facility] at this time  A final decision will be made by tomorrow  · Patient is essentially bed-bound and wheelchair-bound at baseline    Acute respiratory failure with hypoxia ([address] )  Assessment & Plan  · Per ER, slightly hypoxic, now maintaining mid 90s 2L  · In the setting of COVID-19 pneumonia, will monitor closely  · O2 supplement as needed to maintain > 90%    Bacteremia due to Staphylococcus  Assessment & Plan  · Blood cultures returned positive from 8/10 --> One positive for S  Aureus susceptible to Ancef the second gram positive cocci, possibly skin kandi contaminate  · Reordered blood cultures x 2 but while awaiting those will switch pt from cetriaxone to AIV ancef  · Given CrCl is 22, will give one time dose of 1000 and then continue on 500 BID    Severe protein-calorie malnutrition (HCC)  Assessment & Plan  Malnutrition Findings:   Adult Malnutrition type: Chronic illness  Adult Degree of Malnutrition: Other severe protein calorie malnutrition    BMI Findings:  Adult BMI Classifications: Underweight < 18 5     Body mass index is 14 87 kg/m²  · Chronic, BMI < 15  · Nutrition following however patient has been so lethargic an official speech evaluation has been unable to be made given that the patient cannot even your safely complete a speech eval she certainly cannot tolerate p o  Safely given patient's lack of p o   Intake and family not being interested in pursuing a PEG tube hospice should be discussed with the family, plan to have discussion again on Monday 8/14    Parkinson's disease [facility])  Assessment & Plan  · Continue home medications: Neupro patch non-formulary (continue if family able to provide), Stalevo non-formulary (substitute sinemet  mg BID), Nameda 28 mg ER non-formulary (substitute Namenda 10 mg BID)    Hypernatremia  Assessment & Plan  · Suspect due to poor oral intake, hypernatremia 159 on admit  · Continue D5W  · Sodium today 136 and stable  · Continue to monitor    Hypothyroid  Assessment & Plan  · Continue levothyroxine 50 mcg daily    Multiple open wounds of left lower leg  Assessment & Plan  · Patient has several wounds on bilateral feet and calves that are chronic in nature wound care evaluated could consider podiatry consult however given patient's advanced age, dementia, poor functional status will hold off on any additional consult at this time  · Continue local wound care        VTE Pharmacologic Prophylaxis: VTE Score: 4 Moderate Risk (Score 3-4) - Pharmacological DVT Prophylaxis Ordered: heparin  Patient Centered Rounds: I performed bedside rounds with nursing staff today  Discussions with Specialists or Other Care Team Provider: case management    Education and Discussions with Family / Patient: Updated  (daughter) via phone  Time Spent for Care: 30 minutes  More than 50% of total time spent on counseling and coordination of care as described above  Current Length of Stay: 4 day(s)  Current Patient Status: Inpatient   Certification Statement: The patient will continue to require additional inpatient hospital stay due to IV abx, mental status poor  Discharge Plan: Anticipate discharge in >72 hrs to discharge location to be determined pending rehab evaluations  Code Status: Level 3 - DNAR and DNI    Subjective:   Pt has remained nonverbal throughout admission however did open her eyes to name today for me and was able to answer "no" when I asked her if she was having any pain but did not answer when I asked her where she was  Objective:     Vitals:   Temp (24hrs), Av 7 °F (36 5 °C), Min:97 5 °F (36 4 °C), Max:97 8 °F (36 6 °C)    Temp:  [97 5 °F (36 4 °C)-97 8 °F (36 6 °C)] 97 5 °F (36 4 °C)  HR:  [53-71] 64  Resp:  [16-20] 16  BP: (105-126)/(55-60) 126/60  SpO2:  [88 %-99 %] 96 %  Body mass index is 14 87 kg/m²  Input and Output Summary (last 24 hours):      Intake/Output Summary (Last 24 hours) at 2021 1340  Last data filed at 8/13/2021 1903  Gross per 24 hour   Intake 0 ml   Output --   Net 0 ml       Physical Exam:   Physical Exam  Vitals reviewed  Constitutional:       Comments: Lethargic, non conversational, opens eyes to name, severely underweight   HENT:      Head: Normocephalic and atraumatic  Cardiovascular:      Rate and Rhythm: Normal rate and regular rhythm  Pulses: Normal pulses  Heart sounds: Normal heart sounds  Pulmonary:      Effort: Pulmonary effort is normal       Breath sounds: Normal breath sounds  Comments: On 2L O2 via nasal cannula, not in any distress at this time with breathing  Abdominal:      General: Bowel sounds are normal       Palpations: Abdomen is soft  Musculoskeletal:         General: No swelling  Cervical back: Neck supple  Comments: ROM unable to assess   Skin:     Comments: Multiple wounds to bilateral legs, covered in bandages changed today  Skin sloughing present on right arm, not bleeding or infectious appearing   Neurological:      Comments:  Will not answer when asked where she is or what year it is          Additional Data:     Labs:  Results from last 7 days   Lab Units 08/14/21  0518 08/13/21  0613   WBC Thousand/uL 4 31 6 14   HEMOGLOBIN g/dL 10 9* 10 4*   HEMATOCRIT % 34 3* 34 8   PLATELETS Thousands/uL 145* 125*   NEUTROS PCT %  --  84*   LYMPHS PCT %  --  13*   MONOS PCT %  --  2*   EOS PCT %  --  0     Results from last 7 days   Lab Units 08/14/21  0518 08/10/21  1748   SODIUM mmol/L 136 159*   POTASSIUM mmol/L 3 6 3 8   CHLORIDE mmol/L 102 118*   CO2 mmol/L 26 28   BUN mg/dL 22 48*   CREATININE mg/dL 0 94 1 56*   ANION GAP mmol/L 8 13   CALCIUM mg/dL 7 6* 8 6   ALBUMIN g/dL  --  2 7*   TOTAL BILIRUBIN mg/dL  --  0 53   ALK PHOS U/L  --  77   ALT U/L  --  32   AST U/L  --  42   GLUCOSE RANDOM mg/dL 158* 113         Results from last 7 days   Lab Units 08/14/21  1153 08/14/21  1149 08/14/21  0232 08/14/21  0230 08/14/21  0110 08/13/21  2030 08/13/21  1830 08/13/21  1753 08/13/21  1159 08/13/21  0721 08/13/21  0616 08/13/21  0225   POC GLUCOSE mg/dl 107 61* 100 58* 89 135 114 66 138 143* 156* 136         Results from last 7 days   Lab Units 08/11/21  0530 08/10/21  2000   PROCALCITONIN ng/ml 1 20* 1 33*       Lines/Drains:  Invasive Devices     Peripheral Intravenous Line            Peripheral IV 08/14/21 Left Forearm <1 day                      Imaging: Reviewed radiology reports from this admission including: chest xray    Recent Cultures (last 7 days):   Results from last 7 days   Lab Units 08/10/21  2000   BLOOD CULTURE  Staphylococcus coagulase negative*  Staphylococcus aureus*   GRAM STAIN RESULT  Gram positive cocci in clusters*  Gram positive cocci in clusters*       Last 24 Hours Medication List:   Current Facility-Administered Medications   Medication Dose Route Frequency Provider Last Rate    acetaminophen  325 mg Rectal Q4H PRN Diane Barahona PA-C      acetaminophen  650 mg Oral Q6H PRN Hilary Segovia PA-C      aluminum-magnesium hydroxide-simethicone  30 mL Oral Q6H PRN Harriet DAREK Whalen-LÓPEZ      ascorbic acid  1,000 mg Oral Q12H [facility] Hilary Segovia PA-C      aspirin  81 mg Oral Daily Hilary Segovia PA-C      carbidopa-levodopa  1 tablet Oral BID Hilary Segovia PA-C      cefazolin  500 mg Intravenous Q12H Willam Ramsay PA-C      cholecalciferol  2,000 Units Oral Daily Harriet DAREK Whalen-LÓPEZ      dextrose  75 mL/hr Intravenous Continuous Harriet DAREK Whalen-C 75 mL/hr (08/13/21 2044)    ezetimibe  10 mg Oral HS Hilary Segovia PA-C      heparin (porcine)  5,000 Units Subcutaneous Q8H [facility] Hilary Segovia PA-C      levothyroxine  50 mcg Oral Early Morning Harriet DAREK Whalen-LÓPEZ      memantine  10 mg Oral BID Hilary Segovia PA-C      zinc sulfate  220 mg Oral Daily Hilary Segovia PA-C      Followed by   Pasquale Pacheco ON 8/18/2021] multivitamin-minerals  1 tablet Oral Daily Hilary Segovia PA-C      ondansetron  4 mg Intravenous Q6H PRN Kayla Tolentino PA-C      pantoprazole  40 mg Oral Early Morning Kayla Tolentino PA-C      polyethylene glycol  17 g Oral Daily PRN Kayla Tolentino PA-C      rotigotine  1 patch Transdermal Daily Kayla Tolentino PA-C          Today, Patient Was Seen By: Johnathon Leon PA-C    **Please Note: This note may have been constructed using a voice recognition system  **

## 2021-08-15 PROBLEM — E87.6 HYPOKALEMIA: Status: ACTIVE | Noted: 2021-01-01

## 2021-08-15 NOTE — ASSESSMENT & PLAN NOTE
· Potassium 2 9 this AM  · repleted with IV potasium 20 meq x 3  · Repeat potassium this afternoon 3 7  · Will continue to monitor and replete as necessary

## 2021-08-15 NOTE — PROGRESS NOTES
[facility]  Progress Note - [address] 1934, 80 y o  female MRN: [id number]  Unit/Bed#: -Reynaldo Encounter: [id number]  Primary Care Provider: Yadiel Lam MD   Date and time admitted to hospital: 8/10/2021  5:02 PM    * COVID-19  Assessment & Plan  · Present on admission, known sick contacts within the family, unvaccinated  · Admit on mild pathway  · Vitamin-D, zinc, vitamin-C x7 days and multivitamin thereafter (on day 6 however pt not tolerating PO intake and not taking these meds)  · Heparin subQ DVT prophylaxis  · Continue to hold on remdesivir, daughter agreeable if indicated  · Patient on 2 L nasal cannula, tolerating well and remaining >90%  · Ceftriaxone was started and continued for 4 days due to elevated procalcitonin, blood cultures returned 8/14 and pt was switched to Ancef due to susceptibilities, repeat blood cultures pending  · D-dimer increased to 1 12, troponin 0 19, , ESR 79, BNP 2460  · Continue to monitor  · Goals of care discussion was held on 08/11 it was discussed that patient is unable to tolerate p o  Safely and family is not interested in PEG tube placement (discussed with daughter 8/14 this decision was made as it was felt this is a life prolonging measure and would not comply with the tp DNR/DNI)  Patient's code status was updated to DNR/DNI  Palliative care consult was placed  Will continue ongoing goals of care discussion with family  · 8/13: Patient has had little to no improvement over the last 48 hours regarding her mentation she does open her eyes to her name but she is not answered any questions and has essentially been nonverbal for last 48 hours her oxygen is stable on 1 L  Her heart rate did start to dip into the 40s this afternoon  · From 8/14-8/15 pt opened eyes to name and was able to answer that she was not having pain  She does respond much more alertly when spoken to in [address]   Still unable to be oriented however  Had a discussion with both pt's daughters and they are considering hospice at [facility] at this time  A final decision will be made during discussion on Monday  · Patient is essentially bed-bound and wheelchair-bound at baseline    Acute respiratory failure with hypoxia ([address] )  Assessment & Plan  · Per ER, slightly hypoxic, now maintaining mid 90s 2L  · In the setting of COVID-19 pneumonia, will monitor closely  · O2 supplement as needed to maintain > 90%    Bacteremia due to Staphylococcus  Assessment & Plan  · Blood cultures returned positive from 8/10 --> One positive for S  Aureus susceptible to Ancef the second gram positive cocci, possibly skin kandi contaminate  · Reordered blood cultures x 2 but while awaiting those will switch pt from cetriaxone to AIV ancef  · Given CrCl is 22, will give one time dose of 1000 and then continue on 500 BID    Severe protein-calorie malnutrition (HCC)  Assessment & Plan  Malnutrition Findings:   Adult Malnutrition type: Chronic illness  Adult Degree of Malnutrition: Other severe protein calorie malnutrition    BMI Findings:  Adult BMI Classifications: Underweight < 18 5     Body mass index is 14 87 kg/m²  · Chronic, BMI < 15  · Nutrition following however patient has been so lethargic an official speech evaluation has been unable to be made given that the patient cannot even your safely complete a speech eval she certainly cannot tolerate p o  Safely given patient's lack of p o   Intake and family not being interested in pursuing a PEG tube hospice should be discussed with the family, plan to have discussion again on Monday 8/14  · Patient has been becoming intermittently hypoglycemic due to being NPO, continue to monitor this closely and utilize glucose kits as necessary, maintain on D5 in fluids    Hypokalemia  Assessment & Plan  · Potassium 2 9 this AM  · repleted with IV potasium 20 meq x 3  · Repeat potassium this afternoon 3 7  · Will continue to monitor and replete as necessary    [address]inson's [address] [address] )  Assessment & Plan  · Continue home medications: Neupro patch non-formulary (continue if family able to provide), Stalevo non-formulary (substitute sinemet  mg BID), Nameda 28 mg ER non-formulary (substitute Namenda 10 mg BID)    Hypernatremia  Assessment & Plan  · Suspect due to poor oral intake, hypernatremia 159 on admit  · Continue D5W  · Sodium today 136 and stable  · Continue to monitor    Hypothyroid  Assessment & Plan  · Continue levothyroxine 50 mcg daily    Multiple open wounds of left lower leg  Assessment & Plan  · Patient has several wounds on bilateral feet and calves that are chronic in nature wound care evaluated could consider podiatry consult however given patient's advanced age, dementia, poor functional status will hold off on any additional consult at this time  · Continue local wound care          VTE Pharmacologic Prophylaxis: VTE Score: 4 Moderate Risk (Score 3-4) - Pharmacological DVT Prophylaxis Ordered: heparin  Patient Centered Rounds: I performed bedside rounds with nursing staff today  Discussions with Specialists or Other Care Team Provider: none    Education and Discussions with Family / Patient: not updated today, had a lengthy discussion yesterday and will have a lengthy meeting tomorrow about pt's condition  Time Spent for Care: 30 minutes  More than 50% of total time spent on counseling and coordination of care as described above  Current Length of Stay: 5 day(s)  Current Patient Status: Inpatient   Certification Statement: The patient will continue to require additional inpatient hospital stay due to IV abx  Discharge Plan: Anticipate discharge in >72 hrs to hospice house, pending request    Code Status: Level 3 - DNAR and DNI    Subjective:   Patient evaluated at bedside, more alert today such that she opens her eyes easily to name   She is much more responsive with her answers when spoken to in [place], but still slow to answer and only answers with one word  Denied any pain today when asked  Nursing staff reports that she has begun coughing which started last night but she is not coughing anything up  Objective:     Vitals:   Temp (24hrs), Av 8 °F (36 6 °C), Min:97 °F (36 1 °C), Max:98 3 °F (36 8 °C)    Temp:  [97 °F (36 1 °C)-98 3 °F (36 8 °C)] 97 °F (36 1 °C)  HR:  [64-73] 64  Resp:  [14-22] 22  BP: (128-133)/(69-73) 133/73  SpO2:  [92 %-95 %] 92 %  Body mass index is 14 87 kg/m²  Input and Output Summary (last 24 hours): Intake/Output Summary (Last 24 hours) at 8/15/2021 1343  Last data filed at 8/15/2021 1019  Gross per 24 hour   Intake 1140 ml   Output --   Net 1140 ml       Physical Exam:   Physical Exam  Vitals reviewed  Constitutional:       General: She is not in acute distress  Appearance: She is ill-appearing  Comments: Severely underweight and cachectic, opens eyes to name, more responsive when spoken to in [place]   HENT:      Head: Normocephalic and atraumatic  Cardiovascular:      Rate and Rhythm: Normal rate and regular rhythm  Pulses: Normal pulses  Heart sounds: Normal heart sounds  Pulmonary:      Effort: Pulmonary effort is normal       Comments: Increase in wheezing appreciated today on ausculation of the lungs bilaterally  2 L O2 via nasal cannula  Abdominal:      General: Bowel sounds are normal       Palpations: Abdomen is soft     Skin:     Comments: Bilateral ecchymosis and skin sloughing without infection or bleeding present on bilateral upper extremities          Additional Data:     Labs:  Results from last 7 days   Lab Units 08/15/21  0500 21  0613   WBC Thousand/uL 3 59* 6 14   HEMOGLOBIN g/dL 10 1* 10 4*   HEMATOCRIT % 31 6* 34 8   PLATELETS Thousands/uL 144* 125*   BANDS PCT % 3  --    NEUTROS PCT %  --  84*   LYMPHS PCT %  --  13*   LYMPHO PCT % 10*  --    MONOS PCT %  --  2*   MONO PCT % 1*  --    EOS PCT % 0 0     Results from last 7 days   Lab Units 08/15/21  1218 08/15/21  0500   SODIUM mmol/L  --  136   POTASSIUM mmol/L 3 7 2 9*   CHLORIDE mmol/L  --  104   CO2 mmol/L  --  25   BUN mg/dL  --  13   CREATININE mg/dL  --  0 76   ANION GAP mmol/L  --  7   CALCIUM mg/dL  --  7 4*   ALBUMIN g/dL  --  1 5*   TOTAL BILIRUBIN mg/dL  --  0 23   ALK PHOS U/L  --  72   ALT U/L  --  30   AST U/L  --  64*   GLUCOSE RANDOM mg/dL  --  99         Results from last 7 days   Lab Units 08/15/21  1259 08/15/21  1208 08/15/21  0818 08/15/21  0019 08/14/21  1602 08/14/21  1153 08/14/21  1149 08/14/21  0232 08/14/21  0230 08/14/21  0110 08/13/21  2030 08/13/21  1830   POC GLUCOSE mg/dl 91 79 100 93 100 107 61* 100 58* 89 135 114         Results from last 7 days   Lab Units 08/11/21  0530 08/10/21  2000   PROCALCITONIN ng/ml 1 20* 1 33*       Lines/Drains:  Invasive Devices     Peripheral Intravenous Line            Peripheral IV 08/14/21 Left Forearm 1 day                      Imaging: No pertinent imaging reviewed  Recent Cultures (last 7 days):   Results from last 7 days   Lab Units 08/14/21  1134 08/10/21  2000   BLOOD CULTURE  Received in Microbiology Lab  Culture in Progress  Received in Microbiology Lab  Culture in Progress   Staphylococcus coagulase negative*  Staphylococcus aureus*   GRAM STAIN RESULT   --  Gram positive cocci in clusters*  Gram positive cocci in clusters*       Last 24 Hours Medication List:   Current Facility-Administered Medications   Medication Dose Route Frequency Provider Last Rate    acetaminophen  325 mg Rectal Q4H PRN Violet Sandra PA-C      acetaminophen  650 mg Oral Q6H PRN Geneva Astorga PA-C      aluminum-magnesium hydroxide-simethicone  30 mL Oral Q6H PRN Geneva Astorga PA-C      ascorbic acid  1,000 mg Oral Q12H [address] Hilary Segovia PA-C      aspirin  81 mg Oral Daily Hilary Segovia PA-C      carbidopa-levodopa  1 tablet Oral BID Hilary Segovia PA-C      cefazolin  500 mg Intravenous Q12H Lulú Rodriguez PA-C 500 mg (08/15/21 0947)    cholecalciferol  2,000 Units Oral Daily Rashmi Roman, PA-C      dextrose  75 mL/hr Intravenous Continuous Hermitage Roman, PA-C 75 mL/hr (08/15/21 0228)    dextrose           ezetimibe  10 mg Oral HS Hermitage Roman, PA-C      heparin (porcine)  5,000 Units Subcutaneous [address] [place], [place]      levothyroxine  50 mcg Oral Early Morning Rashmiolvin Owens, [place]      memantine  10 mg Oral BID Hilary Segovia PA-C      zinc sulfate  220 mg Oral Daily Hilary Segovia PA-C      Followed by   Ramirez Sarabia ON 8/18/2021] multivitamin-minerals  1 tablet Oral Daily Hilary Segovia PA-C      ondansetron  4 mg Intravenous Q6H PRN Hilary Segovia PA-C      pantoprazole  40 mg Oral Early Morning Hilaryallie Segovia PA-C      polyethylene glycol  17 g Oral Daily PRN HermitageDAREK Swanson-C      potassium chloride  20 mEq Intravenous Q4H KASHMIR Truong 20 mEq (08/15/21 1207)    rotigotine  1 patch Transdermal Daily Rashmi Owens PA-C          Today, Patient Was Seen By: Lulú Rodriguez PA-C    **Please Note: This note may have been constructed using a voice recognition system  **

## 2021-08-16 NOTE — PLAN OF CARE
Problem: Nutrition/Hydration-ADULT  Goal: Nutrient/Hydration intake appropriate for improving, restoring or maintaining nutritional needs  Description: Monitor and assess patient's nutrition/hydration status for malnutrition  Collaborate with interdisciplinary team and initiate plan and interventions as ordered  Monitor patient's weight and dietary intake as ordered or per policy  Utilize nutrition screening tool and intervene as necessary  Determine patient's food preferences and provide high-protein, high-caloric foods as appropriate  INTERVENTIONS:  - Monitor oral intake, urinary output, labs, and treatment plans  - Assess nutrition and hydration status and recommend course of action  - Evaluate amount of meals eaten  - Assist patient with eating if necessary   - Allow adequate time for meals  - Recommend/ encourage appropriate diets, oral nutritional supplements, and vitamin/mineral supplements  - Assess need for intravenous fluids  - Provide nutrition/hydration education as appropriate  - Include patient/family/caregiver in decisions related to nutrition  Outcome: Not Progressing   Pt remain inappropriate for oral diet due to medical/cognitive status  Goals of care indicate no alternate nutrition via tube feeding

## 2021-08-16 NOTE — ASSESSMENT & PLAN NOTE
· Per ER, slightly hypoxic, now maintaining mid 90s 2L intermittent hypoxia  · In the setting of COVID-19 pneumonia, will monitor closely  · O2 supplement as needed to maintain > 90%

## 2021-08-16 NOTE — ASSESSMENT & PLAN NOTE
· Suspect due to poor oral intake, hypernatremia 159 on admit  · Continue D5W  · Sodium today 137 and stable  · Continue to monitor

## 2021-08-16 NOTE — ASSESSMENT & PLAN NOTE
Malnutrition Findings:   Adult Malnutrition type: Chronic illness  Adult Degree of Malnutrition: Other severe protein calorie malnutrition    BMI Findings:  Adult BMI Classifications: Underweight < 18 5     Body mass index is 14 87 kg/m²  · Chronic, BMI < 15  · Nutrition following however patient has been so lethargic an official speech evaluation has been unable to be made given that the patient cannot even your safely complete a speech eval she certainly cannot tolerate p o  Safely given patient's lack of p o   Intake and family not being interested in pursuing a PEG tube hospice should be discussed with the family, plan to have discussion again on Monday 8/16  · Patient has been becoming intermittently hypoglycemic due to being NPO, continue to monitor this closely and utilize glucose kits as necessary, maintain on D5 in fluids

## 2021-08-16 NOTE — PROGRESS NOTES
[facility]  Progress Note - [address] 1934, 80 y o  female MRN: [id number]  Unit/Bed#: MS [facility]amontes-Reynaldo Encounter: [id number]  Primary Care Provider: Doreen Chaudhary MD   Date and time admitted to hospital: 8/10/2021  5:02 PM    * COVID-19  Assessment & Plan  · Present on admission, known sick contacts within the family, unvaccinated  · Admit on mild pathway  · Vitamin-D, zinc, vitamin-C x7 days and multivitamin thereafter (on day 6 however pt not tolerating PO intake and not taking these meds)  · Heparin subQ DVT prophylaxis  · Continue to hold on remdesivir as patient is currently out of the window to receive  · Patient on 2 L nasal cannula patient with intermittent hypoxia 88% otherwise greater than 90%  · Ceftriaxone was started and continued for 4 days due to elevated procalcitonin, blood cultures returned 8/14 1 showing positive for Staph aureus 2nd showing positive for Staph collagenase repeat blood culture showing no growth for 24 hours would continue Ancef until further plan of care can be obtained  · D-dimer increased to 1 12, troponin 0 19, , ESR 79, BNP 2460  · Continue to monitor  · Goals of care discussion was held on 08/11 it was discussed that patient is unable to tolerate p o  Safely and family is not interested in PEG tube placement (discussed with daughter 8/14 this decision was made as it was felt this is a life prolonging measure and would not comply with the tp DNR/DNI)  Patient's code status was updated to DNR/DNI  Palliative care consult was placed  Will continue ongoing goals of care discussion with family  · 8/13: Patient has had little to no improvement over the last 48 hours regarding her mentation she does open her eyes to her name but she is not answered any questions and has essentially been nonverbal for last 48 hours her oxygen is stable on 1 L  Her heart rate did start to dip into the 40s this afternoon     · From 8/14-8/15 pt opened eyes to name and was able to answer that she was not having pain  She does respond much more alertly when spoken to in [address]  Still unable to be oriented however  Had a discussion with both pt's daughters and they are considering hospice at [facility] at this time  · A final decision will be made during discussion today regarding hospice; palliative care following recommendations appreciated  · Patient is essentially bed-bound and wheelchair-bound at baseline    Acute respiratory failure with hypoxia ([address] )  Assessment & Plan  · Per ER, slightly hypoxic, now maintaining mid 90s 2L intermittent hypoxia  · In the setting of COVID-19 pneumonia, will monitor closely  · O2 supplement as needed to maintain > 90%    Bacteremia due to Staphylococcus  Assessment & Plan  · Blood cultures returned positive from 8/10 --> One positive for S   Aureus susceptible to Ancef the second gram positive cocci, possibly skin kandi contaminate  · Reordered blood cultures x 2 negative for growth at 24:00 hours  · Given CrCl is 22, will give one time dose of 1000 and then continue on 500 BID    Hypokalemia  Assessment & Plan  · Noted this admission  · Patient status post replete  · Potassium currently 3 8    Multiple open wounds of left lower leg  Assessment & Plan  · Patient has several wounds on bilateral feet and calves that are chronic in nature wound care evaluated could consider podiatry consult however given patient's advanced age, dementia, poor functional status will hold off on any additional consult at this time  · Continue local wound care    Hypernatremia  Assessment & Plan  · Suspect due to poor oral intake, hypernatremia 159 on admit  · Continue D5W  · Sodium today 137 and stable  · Continue to monitor    Severe protein-calorie malnutrition ([address] )  Assessment & Plan  Malnutrition Findings:   Adult Malnutrition type: Chronic illness  Adult Degree of Malnutrition: Other severe protein calorie malnutrition    BMI Findings:  Adult BMI Classifications: Underweight < 18 5     Body mass index is 14 87 kg/m²  · Chronic, BMI < 15  · Nutrition following however patient has been so lethargic an official speech evaluation has been unable to be made given that the patient cannot even your safely complete a speech eval she certainly cannot tolerate p o  Safely given patient's lack of p o  Intake and family not being interested in pursuing a PEG tube hospice should be discussed with the family, plan to have discussion again on Monday 8/16  · Patient has been becoming intermittently hypoglycemic due to being NPO, continue to monitor this closely and utilize glucose kits as necessary, maintain on D5 in fluids    Parkinson's disease ([facility] [address] )  Assessment & Plan  · Continue home medications: Neupro patch non-formulary (continue if family able to provide), Stalevo non-formulary (substitute sinemet  mg BID), Nameda 28 mg ER non-formulary (substitute Namenda 10 mg BID)    Hypothyroid  Assessment & Plan  · Continue levothyroxine 50 mcg daily          VTE Pharmacologic Prophylaxis: VTE Score: 4 Moderate Risk (Score 3-4) - Pharmacological DVT Prophylaxis Ordered: heparin  Patient Centered Rounds: I performed bedside rounds with nursing staff today  Discussions with Specialists or Other Care Team Provider: palliative care     Education and Discussions with Family / Patient: Palliative care to have discussion with the daughter today regrading goals of care   Time Spent for Care: 30 minutes  More than 50% of total time spent on counseling and coordination of care as described above      Current Length of Stay: 6 day(s)  Current Patient Status: Inpatient   Certification Statement: The patient will continue to require additional inpatient hospital stay due to Ongoing treatment in setting of acute hypoxic respiratory failure  Discharge Plan: Pending palliative care discussion with daughter possible transition to hospice    Code Status: Level 3 - DNAR and DNI    Subjective:   Patient eyes open makes no verbal complaints  Cough remains present patient remains nonverbal at baseline and bed-bound    Objective:     Vitals:   Temp (24hrs), Av 4 °F (35 8 °C), Min:95 7 °F (35 4 °C), Max:97 °F (36 1 °C)    Temp:  [95 7 °F (35 4 °C)-97 °F (36 1 °C)] 95 7 °F (35 4 °C)  HR:  [65-74] 74  Resp:  [17-20] 20  BP: (134-156)/(71-75) 156/71  SpO2:  [88 %-94 %] 88 %  Body mass index is 14 87 kg/m²  Input and Output Summary (last 24 hours):   No intake or output data in the 24 hours ending 21 1030    Physical Exam:   Physical Exam  Constitutional:       Appearance: She is cachectic  HENT:      Head: Normocephalic and atraumatic  Nose: Nose normal       Mouth/Throat:      Mouth: Mucous membranes are moist    Eyes:      Pupils: Pupils are equal, round, and reactive to light  Cardiovascular:      Rate and Rhythm: Normal rate and regular rhythm  Pulmonary:      Effort: No tachypnea, prolonged expiration or respiratory distress  Breath sounds: Examination of the right-upper field reveals rales  Examination of the left-upper field reveals rales  Examination of the right-middle field reveals rales  Examination of the right-lower field reveals rales  Examination of the left-lower field reveals rales  Rales present  Abdominal:      General: Bowel sounds are normal    Neurological:      Mental Status: She is alert  Mental status is at baseline            Additional Data:     Labs:  Results from last 7 days   Lab Units 21  0657 08/15/21  0500 21  0613   WBC Thousand/uL 3 38* 3 59* 6 14   HEMOGLOBIN g/dL 10 7* 10 1* 10 4*   HEMATOCRIT % 34 0* 31 6* 34 8   PLATELETS Thousands/uL 160 144* 125*   BANDS PCT %  --  3  --    NEUTROS PCT %  --   --  84*   LYMPHS PCT %  --   --  13*   LYMPHO PCT %  --  10*  --    MONOS PCT %  --   --  2*   MONO PCT %  --  1*  --    EOS PCT %  --  0 0     Results from last 7 days   Lab Units 21  0657   SODIUM mmol/L 137   POTASSIUM mmol/L 3 8   CHLORIDE mmol/L 107   CO2 mmol/L 24   BUN mg/dL 10   CREATININE mg/dL 0 62   ANION GAP mmol/L 6   CALCIUM mg/dL 7 8*   ALBUMIN g/dL 1 5*   TOTAL BILIRUBIN mg/dL 0 32   ALK PHOS U/L 84   ALT U/L 25   AST U/L 71*   GLUCOSE RANDOM mg/dL 90         Results from last 7 days   Lab Units 08/16/21  0655 08/16/21  0012 08/15/21  1739 08/15/21  1259 08/15/21  1208 08/15/21  0818 08/15/21  0019 08/14/21  1602 08/14/21  1153 08/14/21  1149 08/14/21  0232 08/14/21  0230   POC GLUCOSE mg/dl 93 92 81 91 79 100 93 100 107 61* 100 58*         Results from last 7 days   Lab Units 08/15/21  0500 08/11/21  0530 08/10/21  2000   PROCALCITONIN ng/ml 0 38* 1 20* 1 33*       Lines/Drains:  Invasive Devices     Peripheral Intravenous Line            Peripheral IV 08/15/21 Left;Proximal;Upper;Ventral (anterior) Arm <1 day                      Imaging: Reviewed radiology reports from this admission including: chest xray    Recent Cultures (last 7 days):   Results from last 7 days   Lab Units 08/14/21  1134 08/10/21  2000   BLOOD CULTURE  No Growth at 24 hrs  No Growth at 24 hrs   Staphylococcus coagulase negative*  Staphylococcus aureus*   GRAM STAIN RESULT   --  Gram positive cocci in clusters*  Gram positive cocci in clusters*       Last 24 Hours Medication List:   Current Facility-Administered Medications   Medication Dose Route Frequency Provider Last Rate    acetaminophen  325 mg Rectal Q4H PRN Diane Barahona PA-C      acetaminophen  650 mg Oral Q6H PRN Harriet Whalen PA-C      aluminum-magnesium hydroxide-simethicone  30 mL Oral Q6H PRN Harriet Whalen PA-C      ascorbic acid  1,000 mg Oral Q12H [address] Hilary Segovia PA-C      aspirin  81 mg Oral Daily Hilary Segovia PA-C      carbidopa-levodopa  1 tablet Oral BID Hilary Segovia PA-C      cefazolin  500 mg Intravenous Q12H Willam Ramsay PA-C 500 mg (08/15/21 1466)    cholecalciferol  2,000 Units Oral Daily Harriet Whalen PA-C      dextrose  75 mL/hr Intravenous Continuous Evern Merdaniel, PA-C 75 mL/hr (08/16/21 9261)    ezetimibe  10 mg Oral HS Evern Merl, PA-C      heparin (porcine)  5,000 Units Subcutaneous [facility] [place]      levothyroxine  50 mcg Oral Early Morning Evern Rinku Raya      memantine  10 mg Oral BID Rinku Fung      zinc sulfate  220 mg Oral Daily Hilary Segovia PA-C      Followed by   Sweta Shaikh ON 8/18/2021] multivitamin-minerals  1 tablet Oral Daily Hilary Segovia PA-C      ondansetron  4 mg Intravenous Q6H PRN Hilary Segovia PA-C      pantoprazole  40 mg Oral Early Morning Hilaryallie Segovia PA-C      polyethylene glycol  17 g Oral Daily PRN Evern Dorota, PA-C      rotigotine  1 patch Transdermal Daily Lito Raya PA-C          Today, Patient Was Seen By: KASHMIR Quigley    **Please Note: This note may have been constructed using a voice recognition system  **

## 2021-08-16 NOTE — ASSESSMENT & PLAN NOTE
· Present on admission, known sick contacts within the family, unvaccinated  · Admit on mild pathway  · Vitamin-D, zinc, vitamin-C x7 days and multivitamin thereafter (on day 6 however pt not tolerating PO intake and not taking these meds)  · Heparin subQ DVT prophylaxis  · Continue to hold on remdesivir as patient is currently out of the window to receive  · Patient on 2 L nasal cannula patient with intermittent hypoxia 88% otherwise greater than 90%  · Ceftriaxone was started and continued for 4 days due to elevated procalcitonin, blood cultures returned 8/14 1 showing positive for Staph aureus 2nd showing positive for Staph collagenase repeat blood culture showing no growth for 24 hours would continue Ancef until further plan of care can be obtained  · D-dimer increased to 1 12, troponin 0 19, , ESR 79, BNP 2460  · Continue to monitor  · Goals of care discussion was held on 08/11 it was discussed that patient is unable to tolerate p o  Safely and family is not interested in PEG tube placement (discussed with daughter 8/14 this decision was made as it was felt this is a life prolonging measure and would not comply with the tp DNR/DNI)  Patient's code status was updated to DNR/DNI  Palliative care consult was placed  Will continue ongoing goals of care discussion with family  · 8/13: Patient has had little to no improvement over the last 48 hours regarding her mentation she does open her eyes to her name but she is not answered any questions and has essentially been nonverbal for last 48 hours her oxygen is stable on 1 L  Her heart rate did start to dip into the 40s this afternoon  · From 8/14-8/15 pt opened eyes to name and was able to answer that she was not having pain  She does respond much more alertly when spoken to in [address]  Still unable to be oriented however  Had a discussion with both pt's daughters and they are considering hospice at [facility] at this time     · A final decision will be made during discussion today regarding hospice; palliative care following recommendations appreciated  · Patient is essentially bed-bound and wheelchair-bound at baseline

## 2021-08-16 NOTE — ASSESSMENT & PLAN NOTE
· Blood cultures returned positive from 8/10 --> One positive for S   Aureus susceptible to Ancef the second gram positive cocci, possibly skin kandi contaminate  · Reordered blood cultures x 2 negative for growth at 24:00 hours  · Given CrCl is 22, will give one time dose of 1000 and then continue on 500 BID

## 2021-08-16 NOTE — PLAN OF CARE
Problem: Potential for Falls  Goal: Patient will remain free of falls  Description: INTERVENTIONS:  - Educate patient/family on patient safety including physical limitations  - Instruct patient to call for assistance with activity   - Consult OT/PT to assist with strengthening/mobility   - Keep Call bell within reach  - Keep bed low and locked with side rails adjusted as appropriate  - Keep care items and personal belongings within reach  - Initiate and maintain comfort rounds  - Make Fall Risk Sign visible to staff  - Offer Toileting every  Hours, in advance of need  - Initiate/Maintain alarm  - Obtain necessary fall risk management equipment:   - Apply yellow socks and bracelet for high fall risk patients  - Consider moving patient to room near nurses station  Outcome: Progressing     Problem: Prexisting or High Potential for Compromised Skin Integrity  Goal: Skin integrity is maintained or improved  Description: INTERVENTIONS:  - Identify patients at risk for skin breakdown  - Assess and monitor skin integrity  - Assess and monitor nutrition and hydration status  - Monitor labs   - Assess for incontinence   - Turn and reposition patient  - Assist with mobility/ambulation  - Relieve pressure over bony prominences  - Avoid friction and shearing  - Provide appropriate hygiene as needed including keeping skin clean and dry  - Evaluate need for skin moisturizer/barrier cream  - Collaborate with interdisciplinary team   - Patient/family teaching  - Consider wound care consult   Outcome: Progressing     Problem: Nutrition/Hydration-ADULT  Goal: Nutrient/Hydration intake appropriate for improving, restoring or maintaining nutritional needs  Description: Monitor and assess patient's nutrition/hydration status for malnutrition  Collaborate with interdisciplinary team and initiate plan and interventions as ordered  Monitor patient's weight and dietary intake as ordered or per policy   Utilize nutrition screening tool and intervene as necessary  Determine patient's food preferences and provide high-protein, high-caloric foods as appropriate  INTERVENTIONS:  - Monitor oral intake, urinary output, labs, and treatment plans  - Assess nutrition and hydration status and recommend course of action  - Evaluate amount of meals eaten  - Assist patient with eating if necessary   - Allow adequate time for meals  - Recommend/ encourage appropriate diets, oral nutritional supplements, and vitamin/mineral supplements  - Assess need for intravenous fluids  - Provide nutrition/hydration education as appropriate  - Include patient/family/caregiver in decisions related to nutrition  Outcome: Progressing     Problem: MOBILITY - ADULT  Goal: Maintain or return to baseline ADL function  Description: INTERVENTIONS:  -  Assess patient's ability to carry out ADLs; assess patient's baseline for ADL function and identify physical deficits which impact ability to perform ADLs (bathing, care of mouth/teeth, toileting, grooming, dressing, etc )  - Assess/evaluate cause of self-care deficits   - Assess range of motion  - Assess patient's mobility; develop plan if impaired  - Assess patient's need for assistive devices and provide as appropriate  - Encourage maximum independence but intervene and supervise when necessary  - Involve family in performance of ADLs  - Assess for home care needs following discharge   - Consider OT consult to assist with ADL evaluation and planning for discharge  - Provide patient education as appropriate  Outcome: Progressing  Goal: Maintains/Returns to pre admission functional level  Description: INTERVENTIONS:  - Perform BMAT or MOVE assessment daily    - Set and communicate daily mobility goal to care team and patient/family/caregiver  - Collaborate with rehabilitation services on mobility goals if consulted  - Perform Range of Motion  times a day  - Reposition patient every  hours    - Dangle patient  times a day  - Stand patient  times a day  - Ambulate patient  times a day  - Out of bed to chair times a day   - Out of bed for meals  times a day  - Out of bed for toileting  - Record patient progress and toleration of activity level   Outcome: Progressing     Problem: PAIN - ADULT  Goal: Verbalizes/displays adequate comfort level or baseline comfort level  Description: Interventions:  - Encourage patient to monitor pain and request assistance  - Assess pain using appropriate pain scale  - Administer analgesics based on type and severity of pain and evaluate response  - Implement non-pharmacological measures as appropriate and evaluate response  - Consider cultural and social influences on pain and pain management  - Notify physician/advanced practitioner if interventions unsuccessful or patient reports new pain  Outcome: Progressing     Problem: INFECTION - ADULT  Goal: Absence or prevention of progression during hospitalization  Description: INTERVENTIONS:  - Assess and monitor for signs and symptoms of infection  - Monitor lab/diagnostic results  - Monitor all insertion sites, i e  indwelling lines, tubes, and drains  - Monitor endotracheal if appropriate and nasal secretions for changes in amount and color  - [place] appropriate cooling/warming therapies per order  - Administer medications as ordered  - Instruct and encourage patient and family to use good hand hygiene technique  - Identify and instruct in appropriate isolation precautions for identified infection/condition  Outcome: Progressing  Goal: Absence of fever/infection during neutropenic period  Description: INTERVENTIONS:  - Monitor WBC    Outcome: Progressing     Problem: SAFETY ADULT  Goal: Patient will remain free of falls  Description: INTERVENTIONS:  - Educate patient/family on patient safety including physical limitations  - Instruct patient to call for assistance with activity   - Consult OT/PT to assist with strengthening/mobility   - Keep Call bell within reach  - Keep bed low and locked with side rails adjusted as appropriate  - Keep care items and personal belongings within reach  - Initiate and maintain comfort rounds  - Make Fall Risk Sign visible to staff  - Offer Toileting every  Hours, in advance of need  - Initiate/Maintain alarm  - Obtain necessary fall risk management equipment:   - Apply yellow socks and bracelet for high fall risk patients  - Consider moving patient to room near nurses station  Outcome: Progressing  Goal: Maintain or return to baseline ADL function  Description: INTERVENTIONS:  -  Assess patient's ability to carry out ADLs; assess patient's baseline for ADL function and identify physical deficits which impact ability to perform ADLs (bathing, care of mouth/teeth, toileting, grooming, dressing, etc )  - Assess/evaluate cause of self-care deficits   - Assess range of motion  - Assess patient's mobility; develop plan if impaired  - Assess patient's need for assistive devices and provide as appropriate  - Encourage maximum independence but intervene and supervise when necessary  - Involve family in performance of ADLs  - Assess for home care needs following discharge   - Consider OT consult to assist with ADL evaluation and planning for discharge  - Provide patient education as appropriate  Outcome: Progressing  Goal: Maintains/Returns to pre admission functional level  Description: INTERVENTIONS:  - Perform BMAT or MOVE assessment daily    - Set and communicate daily mobility goal to care team and patient/family/caregiver  - Collaborate with rehabilitation services on mobility goals if consulted  - Perform Range of Motion  times a day  - Reposition patient every  hours    - Dangle patient  times a day  - Stand patient  times a day  - Ambulate patient  times a day  - Out of bed to chair  times a day   - Out of bed for meals  times a day  - Out of bed for toileting  - Record patient progress and toleration of activity level   Outcome: Progressing     Problem: DISCHARGE PLANNING  Goal: Discharge to home or other facility with appropriate resources  Description: INTERVENTIONS:  - Identify barriers to discharge w/patient and caregiver  - Arrange for needed discharge resources and transportation as appropriate  - Identify discharge learning needs (meds, wound care, etc )  - Arrange for interpretive services to assist at discharge as needed  - Refer to Case Management Department for coordinating discharge planning if the patient needs post-hospital services based on physician/advanced practitioner order or complex needs related to functional status, cognitive ability, or social support system  Outcome: Progressing     Problem: Knowledge Deficit  Goal: Patient/family/caregiver demonstrates understanding of disease process, treatment plan, medications, and discharge instructions  Description: Complete learning assessment and assess knowledge base    Interventions:  - Provide teaching at level of understanding  - Provide teaching via preferred learning methods  Outcome: Progressing

## 2021-08-17 NOTE — PROGRESS NOTES
[facility]  Progress Note - [address] 1934, 80 y o  female MRN: [id number]  Unit/Bed#: -[facility]yn[facility] Encounter: [id number]  Primary Care Provider: Mary Ramírez MD   Date and time admitted to hospital: 8/10/2021  5:02 PM    * COVID-19  Assessment & Plan  · Present on admission, known sick contacts within the family, unvaccinated  · Admit on mild pathway  · Vitamin-D, zinc, vitamin-C x7 days and multivitamin thereafter (on day 6 however pt not tolerating PO intake and not taking these meds)  · Heparin subQ DVT prophylaxis  · Continue to hold on remdesivir as patient is currently out of the window to receive  · Patient on 2 L nasal cannula patient with intermittent hypoxia 88% otherwise greater than 90% wean oxygen as appropriate  · Ceftriaxone was started and continued for 4 days due to elevated procalcitonin, blood cultures returned 8/14 1 showing positive for Staph aureus 2nd showing positive for Staph collagenase repeat blood culture showing no growth for 48 hours would continue Ancef until further plan of care can be obtained  · D-dimer increased to 1 12, troponin 0 19, , ESR 79, BNP 2460  · Continue to monitor  · Goals of care discussion was held on 08/11 it was discussed that patient is unable to tolerate p o  Safely and family is not interested in PEG tube placement (discussed with daughter 8/14 this decision was made as it was felt this is a life prolonging measure and would not comply with the tp DNR/DNI)  Patient's code status was updated to DNR/DNI  Palliative care consult was placed  Will continue ongoing goals of care discussion with family  · 8/13: Patient has had little to no improvement over the last 48 hours regarding her mentation she does open her eyes to her name but she is not answered any questions and has essentially been nonverbal for last 48 hours her oxygen is stable on 1 L  Her heart rate did start to dip into the 40s this afternoon  · From 8/14-8/15 pt opened eyes to name and was able to answer that she was not having pain  She does respond much more alertly when spoken to in [address]  Still unable to be oriented however  Had a discussion with both pt's daughters and they are considering hospice at [facility] at this time  · A final decision will be made during discussion today regarding hospice; palliative care following recommendations appreciated  · Patient is essentially bed-bound and wheelchair-bound at baseline    Acute respiratory failure with hypoxia ([address] )  Assessment & Plan  · Per ER, slightly hypoxic, now maintaining mid 90s 2L intermittent hypoxia  · In the setting of COVID-19 pneumonia, will monitor closely  · O2 supplement as needed to maintain > 90%    Bacteremia due to Staphylococcus  Assessment & Plan  · Blood cultures returned positive from 8/10 --> One positive for S   Aureus susceptible to Ancef the second gram positive cocci, possibly skin kandi contaminate  · Reordered blood cultures x 2 negative for growth at 48 hours  · Given CrCl is 22, patient was given a one time dose of 1000 mg and then continue on 500 mg BID    Hypokalemia  Assessment & Plan  · Noted this admission  · Patient status post replete  · Potassium currently 3 8    Multiple open wounds of left lower leg  Assessment & Plan  · Patient has several wounds on bilateral feet and calves that are chronic in nature wound care evaluated could consider podiatry consult however given patient's advanced age, dementia, poor functional status will hold off on any additional consult at this time  · Continue local wound care    Hypernatremia  Assessment & Plan  · Suspect due to poor oral intake, hypernatremia 159 on admit  · Continue D5W  · Sodium normalized  · Continue to monitor    Severe protein-calorie malnutrition ([facility] [address] )  Assessment & Plan  Malnutrition Findings:   Adult Malnutrition type: Chronic illness  Adult Degree of Malnutrition: Other severe protein calorie malnutrition    BMI Findings:  Adult BMI Classifications: Underweight < 18 5     Body mass index is 14 87 kg/m²  · Chronic, BMI < 15  · Nutrition following however patient has been so lethargic an official speech evaluation has been unable to be made given that the patient cannot even your safely complete a speech eval she certainly cannot tolerate p o  Safely given patient's lack of p o  Intake and family not being interested in pursuing a PEG tube hospice should be discussed with the family, plan to have discussion again on Today Tuesday 8/17  · Patient has been becoming intermittently hypoglycemic due to being NPO, continue to monitor this closely and utilize glucose kits as necessary, maintain on D5 in fluids    Parkinson's disease ([facility] [address] )  Assessment & Plan  · Continue home medications: Neupro patch non-formulary (continue if family able to provide), Stalevo non-formulary (substitute sinemet  mg BID), Nameda 28 mg ER non-formulary (substitute Namenda 10 mg BID)    Hypothyroid  Assessment & Plan  · Continue levothyroxine 50 mcg daily          VTE Pharmacologic Prophylaxis: VTE Score: 4 Moderate Risk (Score 3-4) - Pharmacological DVT Prophylaxis Ordered: heparin  Patient Centered Rounds: Rounded with nursing outside of the room due to contact/airborne precautions in setting of COVID-19  Discussions with Specialists or Other Care Team Provider:  Palliative care    Education and Discussions with Family / Patient: Pending goals discussion with palliative care team      Time Spent for Care: 30 minutes  More than 50% of total time spent on counseling and coordination of care as described above      Current Length of Stay: 7 day(s)  Current Patient Status: Inpatient   Certification Statement: The patient will continue to require additional inpatient hospital stay due to Ongoing treatment in setting of COVID-19 infection  Discharge Plan: Pending palliative care evaluation discussion with daughter    Code Status: Level 3 - DNAR and DNI    Subjective:   Patient remains nonverbal will follow eye contact  Daughter has had limited contact the team in setting of her COVID-19 infection however palliative care will be reaching out to the daughter to discuss further goals of care given mother remains NPO and family does not want to pursue a PEG tube at this time  Objective:     Vitals:   Temp (24hrs), Av 4 °F (36 3 °C), Min:97 3 °F (36 3 °C), Max:97 5 °F (36 4 °C)    Temp:  [97 3 °F (36 3 °C)-97 5 °F (36 4 °C)] 97 5 °F (36 4 °C)  HR:  [66-70] 66  Resp:  [20] 20  BP: (152-159)/(71-85) 159/85  SpO2:  [93 %-95 %] 93 %  Body mass index is 14 87 kg/m²  Input and Output Summary (last 24 hours): Intake/Output Summary (Last 24 hours) at 2021 0903  Last data filed at 2021 8318  Gross per 24 hour   Intake 500 ml   Output --   Net 500 ml       Physical Exam:   Physical Exam  HENT:      Head: Normocephalic  Nose: Nose normal       Mouth/Throat:      Mouth: Mucous membranes are moist    Eyes:      Pupils: Pupils are equal, round, and reactive to light  Cardiovascular:      Rate and Rhythm: Normal rate and regular rhythm  Pulmonary:      Effort: No tachypnea, accessory muscle usage or respiratory distress  Breath sounds: Examination of the right-upper field reveals rhonchi  Examination of the left-upper field reveals rhonchi  Examination of the right-middle field reveals rhonchi  Rhonchi present  Abdominal:      General: Bowel sounds are normal    Skin:     General: Skin is warm and dry  Neurological:      Mental Status: She is alert  Mental status is at baseline           Additional Data:     Labs:  Results from last 7 days   Lab Units 21  0657 08/15/21  0500 21  0613   WBC Thousand/uL 3 38* 3 59* 6 14   HEMOGLOBIN g/dL 10 7* 10 1* 10 4*   HEMATOCRIT % 34 0* 31 6* 34 8   PLATELETS Thousands/uL 160 144* 125*   BANDS PCT %  --  3  --    NEUTROS PCT %  --   --  84*   LYMPHS PCT %  -- --  13*   LYMPHO PCT %  --  10*  --    MONOS PCT %  --   --  2*   MONO PCT %  --  1*  --    EOS PCT %  --  0 0     Results from last 7 days   Lab Units 08/16/21  0657   SODIUM mmol/L 137   POTASSIUM mmol/L 3 8   CHLORIDE mmol/L 107   CO2 mmol/L 24   BUN mg/dL 10   CREATININE mg/dL 0 62   ANION GAP mmol/L 6   CALCIUM mg/dL 7 8*   ALBUMIN g/dL 1 5*   TOTAL BILIRUBIN mg/dL 0 32   ALK PHOS U/L 84   ALT U/L 25   AST U/L 71*   GLUCOSE RANDOM mg/dL 90         Results from last 7 days   Lab Units 08/17/21  0716 08/16/21  2338 08/16/21  1755 08/16/21  1141 08/16/21  0655 08/16/21  0012 08/15/21  1739 08/15/21  1259 08/15/21  1208 08/15/21  0818 08/15/21  0019 08/14/21  1602   POC GLUCOSE mg/dl 112 126 124 147* 93 92 81 91 79 100 93 100         Results from last 7 days   Lab Units 08/16/21  0657 08/15/21  0500 08/11/21  0530 08/10/21  2000   PROCALCITONIN ng/ml 0 27* 0 38* 1 20* 1 33*       Lines/Drains:  Invasive Devices     Peripheral Intravenous Line            Peripheral IV 08/15/21 Left;Proximal;Upper;Ventral (anterior) Arm 1 day                      Imaging: No pertinent imaging reviewed  Recent Cultures (last 7 days):   Results from last 7 days   Lab Units 08/14/21  1134 08/10/21  2000   BLOOD CULTURE  No Growth at 48 hrs  No Growth at 48 hrs   Staphylococcus coagulase negative*  Staphylococcus aureus*   GRAM STAIN RESULT   --  Gram positive cocci in clusters*  Gram positive cocci in clusters*       Last 24 Hours Medication List:   Current Facility-Administered Medications   Medication Dose Route Frequency Provider Last Rate    acetaminophen  325 mg Rectal Q4H PRN Diane Barahona PA-C      acetaminophen  650 mg Oral Q6H PRN Valcruz Anaya PA-C      aluminum-magnesium hydroxide-simethicone  30 mL Oral Q6H PRN Ashish Anaya PA-C      ascorbic acid  1,000 mg Oral Q12H [address] Hilary E ARMAAN Segovia      aspirin  81 mg Oral Daily Hilary Segovia PA-C      carbidopa-levodopa  1 tablet Oral BID Ashish Anaya, ARMAAN      cefazolin  500 mg Intravenous Q12H Oliver Lopez PA-C 500 mg (08/16/21 2138)    cholecalciferol  2,000 Units Oral Daily Hilary Segovia PA-C      dextrose  75 mL/hr Intravenous Continuous Vargas Gu PA-C 75 mL/hr (08/17/21 0952)    ezetimibe  10 mg Oral HS Vargas Gu PA-C      heparin (porcine)  5,000 Units Subcutaneous [facility] Vargas Gu PA-C      levothyroxine  50 mcg Oral Early Morning Vargas Gu PA-C      memantine  10 mg Oral BID Hilary Segovia PA-C      zinc sulfate  220 mg Oral Daily Hilary Segovia PA-C      Followed by   Omero Epps ON 8/18/2021] multivitamin-minerals  1 tablet Oral Daily Hilary Segovia PA-C      ondansetron  4 mg Intravenous Q6H PRN Hilary Segovia PA-C      pantoprazole  40 mg Oral Early Morning Hilary Segovia PA-C      polyethylene glycol  17 g Oral Daily PRN Vargas Gu PA-C          Today, Patient Was Seen By: KASHMIR Velasquez    **Please Note: This note may have been constructed using a voice recognition system  **

## 2021-08-17 NOTE — ASSESSMENT & PLAN NOTE
· Patient has several wounds on bilateral feet and calves that are chronic in nature wound care evaluated could consider podiatry consult however given patient's advanced age, dementia, poor functional status will hold off on any additional consult at this time  · Continue local wound care Yes

## 2021-08-17 NOTE — ASSESSMENT & PLAN NOTE
· Suspect due to poor oral intake, hypernatremia 159 on admit  · Continue D5W  · Sodium normalized  · Continue to monitor

## 2021-08-17 NOTE — ASSESSMENT & PLAN NOTE
· Present on admission, known sick contacts within the family, unvaccinated  · Admit on mild pathway  · Vitamin-D, zinc, vitamin-C x7 days and multivitamin thereafter (on day 6 however pt not tolerating PO intake and not taking these meds)  · Heparin subQ DVT prophylaxis  · Continue to hold on remdesivir as patient is currently out of the window to receive  · Patient on 2 L nasal cannula patient with intermittent hypoxia 88% otherwise greater than 90% wean oxygen as appropriate  · Ceftriaxone was started and continued for 4 days due to elevated procalcitonin, blood cultures returned 8/14 1 showing positive for Staph aureus 2nd showing positive for Staph collagenase repeat blood culture showing no growth for 48 hours would continue Ancef until further plan of care can be obtained  · D-dimer increased to 1 12, troponin 0 19, , ESR 79, BNP 2460  · Continue to monitor  · Goals of care discussion was held on 08/11 it was discussed that patient is unable to tolerate p o  Safely and family is not interested in PEG tube placement (discussed with daughter 8/14 this decision was made as it was felt this is a life prolonging measure and would not comply with the tp DNR/DNI)  Patient's code status was updated to DNR/DNI  Palliative care consult was placed  Will continue ongoing goals of care discussion with family  · 8/13: Patient has had little to no improvement over the last 48 hours regarding her mentation she does open her eyes to her name but she is not answered any questions and has essentially been nonverbal for last 48 hours her oxygen is stable on 1 L  Her heart rate did start to dip into the 40s this afternoon  · From 8/14-8/15 pt opened eyes to name and was able to answer that she was not having pain  She does respond much more alertly when spoken to in [place]  Still unable to be oriented however   Had a discussion with both pt's daughters and they are considering hospice at [facility] at this time    · A final decision will be made during discussion today regarding hospice; palliative care following recommendations appreciated  · Patient is essentially bed-bound and wheelchair-bound at baseline

## 2021-08-17 NOTE — PLAN OF CARE
Problem: Potential for Falls  Goal: Patient will remain free of falls  Description: INTERVENTIONS:  - Educate patient/family on patient safety including physical limitations  - Instruct patient to call for assistance with activity   - Consult OT/PT to assist with strengthening/mobility   - Keep Call bell within reach  - Keep bed low and locked with side rails adjusted as appropriate  - Keep care items and personal belongings within reach  - Initiate and maintain comfort rounds  - Make Fall Risk Sign visible to staff  - Offer Toileting every  Hours, in advance of need  - Initiate/Maintain alarm  - Obtain necessary fall risk management equipment:   - Apply yellow socks and bracelet for high fall risk patients  - Consider moving patient to room near nurses station  Outcome: Progressing     Problem: Prexisting or High Potential for Compromised Skin Integrity  Goal: Skin integrity is maintained or improved  Description: INTERVENTIONS:  - Identify patients at risk for skin breakdown  - Assess and monitor skin integrity  - Assess and monitor nutrition and hydration status  - Monitor labs   - Assess for incontinence   - Turn and reposition patient  - Assist with mobility/ambulation  - Relieve pressure over bony prominences  - Avoid friction and shearing  - Provide appropriate hygiene as needed including keeping skin clean and dry  - Evaluate need for skin moisturizer/barrier cream  - Collaborate with interdisciplinary team   - Patient/family teaching  - Consider wound care consult   Outcome: Progressing     Problem: Nutrition/Hydration-ADULT  Goal: Nutrient/Hydration intake appropriate for improving, restoring or maintaining nutritional needs  Description: Monitor and assess patient's nutrition/hydration status for malnutrition  Collaborate with interdisciplinary team and initiate plan and interventions as ordered  Monitor patient's weight and dietary intake as ordered or per policy   Utilize nutrition screening tool and intervene as necessary  Determine patient's food preferences and provide high-protein, high-caloric foods as appropriate  INTERVENTIONS:  - Monitor oral intake, urinary output, labs, and treatment plans  - Assess nutrition and hydration status and recommend course of action  - Evaluate amount of meals eaten  - Assist patient with eating if necessary   - Allow adequate time for meals  - Recommend/ encourage appropriate diets, oral nutritional supplements, and vitamin/mineral supplements  - Assess need for intravenous fluids  - Provide nutrition/hydration education as appropriate  - Include patient/family/caregiver in decisions related to nutrition  Outcome: Progressing     Problem: MOBILITY - ADULT  Goal: Maintain or return to baseline ADL function  Description: INTERVENTIONS:  -  Assess patient's ability to carry out ADLs; assess patient's baseline for ADL function and identify physical deficits which impact ability to perform ADLs (bathing, care of mouth/teeth, toileting, grooming, dressing, etc )  - Assess/evaluate cause of self-care deficits   - Assess range of motion  - Assess patient's mobility; develop plan if impaired  - Assess patient's need for assistive devices and provide as appropriate  - Encourage maximum independence but intervene and supervise when necessary  - Involve family in performance of ADLs  - Assess for home care needs following discharge   - Consider OT consult to assist with ADL evaluation and planning for discharge  - Provide patient education as appropriate  Outcome: Progressing  Goal: Maintains/Returns to pre admission functional level  Description: INTERVENTIONS:  - Perform BMAT or MOVE assessment daily    - Set and communicate daily mobility goal to care team and patient/family/caregiver  - Collaborate with rehabilitation services on mobility goals if consulted  - Perform Range of Motion  times a day  - Reposition patient every  hours    - Dangle patient  times a day  - Stand patient  times a day  - Ambulate patient  times a day  - Out of bed to chair  times a day   - Out of bed for meals  times a day  - Out of bed for toileting  - Record patient progress and toleration of activity level   Outcome: Progressing     Problem: PAIN - ADULT  Goal: Verbalizes/displays adequate comfort level or baseline comfort level  Description: Interventions:  - Encourage patient to monitor pain and request assistance  - Assess pain using appropriate pain scale  - Administer analgesics based on type and severity of pain and evaluate response  - Implement non-pharmacological measures as appropriate and evaluate response  - Consider cultural and social influences on pain and pain management  - Notify physician/advanced practitioner if interventions unsuccessful or patient reports new pain  Outcome: Progressing     Problem: INFECTION - ADULT  Goal: Absence or prevention of progression during hospitalization  Description: INTERVENTIONS:  - Assess and monitor for signs and symptoms of infection  - Monitor lab/diagnostic results  - Monitor all insertion sites, i e  indwelling lines, tubes, and drains  - Monitor endotracheal if appropriate and nasal secretions for changes in amount and color  - [place] appropriate cooling/warming therapies per order  - Administer medications as ordered  - Instruct and encourage patient and family to use good hand hygiene technique  - Identify and instruct in appropriate isolation precautions for identified infection/condition  Outcome: Progressing  Goal: Absence of fever/infection during neutropenic period  Description: INTERVENTIONS:  - Monitor WBC    Outcome: Progressing     Problem: SAFETY ADULT  Goal: Patient will remain free of falls  Description: INTERVENTIONS:  - Educate patient/family on patient safety including physical limitations  - Instruct patient to call for assistance with activity   - Consult OT/PT to assist with strengthening/mobility   - Keep Call bell within reach  - Keep bed low and locked with side rails adjusted as appropriate  - Keep care items and personal belongings within reach  - Initiate and maintain comfort rounds  - Make Fall Risk Sign visible to staff  - Offer Toileting every  Hours, in advance of need  - Initiate/Maintain alarm  - Obtain necessary fall risk management equipment:   - Apply yellow socks and bracelet for high fall risk patients  - Consider moving patient to room near nurses station  Outcome: Progressing  Goal: Maintain or return to baseline ADL function  Description: INTERVENTIONS:  -  Assess patient's ability to carry out ADLs; assess patient's baseline for ADL function and identify physical deficits which impact ability to perform ADLs (bathing, care of mouth/teeth, toileting, grooming, dressing, etc )  - Assess/evaluate cause of self-care deficits   - Assess range of motion  - Assess patient's mobility; develop plan if impaired  - Assess patient's need for assistive devices and provide as appropriate  - Encourage maximum independence but intervene and supervise when necessary  - Involve family in performance of ADLs  - Assess for home care needs following discharge   - Consider OT consult to assist with ADL evaluation and planning for discharge  - Provide patient education as appropriate  Outcome: Progressing  Goal: Maintains/Returns to pre admission functional level  Description: INTERVENTIONS:  - Perform BMAT or MOVE assessment daily    - Set and communicate daily mobility goal to care team and patient/family/caregiver  - Collaborate with rehabilitation services on mobility goals if consulted  - Perform Range of Motion  times a day  - Reposition patient every  hours    - Dangle patient  times a day  - Stand patient  times a day  - Ambulate patient  times a day  - Out of bed to chair  times a day   - Out of bed for meals  times a day  - Out of bed for toileting  - Record patient progress and toleration of activity level   Outcome: Progressing     Problem: DISCHARGE PLANNING  Goal: Discharge to home or other facility with appropriate resources  Description: INTERVENTIONS:  - Identify barriers to discharge w/patient and caregiver  - Arrange for needed discharge resources and transportation as appropriate  - Identify discharge learning needs (meds, wound care, etc )  - Arrange for interpretive services to assist at discharge as needed  - Refer to Case Management Department for coordinating discharge planning if the patient needs post-hospital services based on physician/advanced practitioner order or complex needs related to functional status, cognitive ability, or social support system  Outcome: Progressing     Problem: Knowledge Deficit  Goal: Patient/family/caregiver demonstrates understanding of disease process, treatment plan, medications, and discharge instructions  Description: Complete learning assessment and assess knowledge base    Interventions:  - Provide teaching at level of understanding  - Provide teaching via preferred learning methods  Outcome: Progressing

## 2021-08-17 NOTE — PROGRESS NOTES
Progress note - Palliative and Supportive Care   Marilyn Castellanos 80 y o  female [id number]    Patient Active Problem List   Diagnosis    Slow transit constipation    [facility]    Maxillary fracture [facility]    Zygomatic fracture ([facility]    Fall    Facial laceration    Diarrhea    Memory deficit    Hypothyroid    HLD (hyperlipidemia)    Osteoporosis    Parkinson's disease ([facility]    Ambulatory dysfunction    Hematochezia    Severe protein-calorie malnutrition (HCC)    COVID-19    Hypernatremia    Acute respiratory failure with hypoxia (HCC)    Multiple open wounds of left lower leg    Bacteremia due to Staphylococcus    Hypokalemia     Active issues specifically addressed today include:   - COVID PNA [mild pathway]   - Parkinson's disease   - dementia, non-verbal at baseline   - protein-calorie malnutrition   - goals of care support    Plan:  #symptom management   - per primary team    #goals of care   - spoke with Manny Mcdaniel [daughter] via telephone   - continues with minimal improvement in medical course   - unable to safely tolerate PO intake per ST; remains NPO   - no desire to pursue PEG tube   - given the above, patient's daughter feels that [facility] facility is best to manage patient's symptoms   - all questions/concerns addressed   - will notify CM and primary team of this discussion    #psychosocial support   - emotional support provided      We appreciate the opportunity to participate in this patient's care  We will continue to follow  Please do not hesitate to contact our on-call provider through our clinic answering service at [phone] should you have acute symptom control concerns  Code Status: DNAR/DNI - Level 3   Decisional apparatus:  Patient is not competent on my exam today  If competence is lost, patient's substitute decision maker would default to adult children by [place]     Advance Directive / Living Will / POLST:  Not on File    Interval history:   - NAEO   - telephone discussion with patient's daughter via telephone   - no clinical change; remains unable to safely tolerate PO intake    MEDICATIONS / ALLERGIES:     current meds:   Current Facility-Administered Medications   Medication Dose Route Frequency    acetaminophen (TYLENOL) rectal suppository 325 mg  325 mg Rectal Q4H PRN    acetaminophen (TYLENOL) tablet 650 mg  650 mg Oral Q6H PRN    aluminum-magnesium hydroxide-simethicone (MYLANTA) oral suspension 30 mL  30 mL Oral Q6H PRN    ascorbic acid (VITAMIN C) tablet 1,000 mg  1,000 mg Oral Q12H [address]    aspirin (ECOTRIN LOW STRENGTH) EC tablet 81 mg  81 mg Oral Daily    carbidopa-levodopa (SINEMET)  mg per tablet 1 tablet  1 tablet Oral BID    ceFAZolin (ANCEF) 500 mg in sodium chloride 0 9 % 50 mL IVPB  500 mg Intravenous Q12H    cholecalciferol (VITAMIN D3) tablet 2,000 Units  2,000 Units Oral Daily    dextrose 5 % infusion  75 mL/hr Intravenous Continuous    ezetimibe (ZETIA) tablet 10 mg  10 mg Oral HS    heparin (porcine) subcutaneous injection 5,000 Units  5,000 Units Subcutaneous Q8H [address]    levothyroxine tablet 50 mcg  50 mcg Oral Early Morning    memantine (NAMENDA) tablet 10 mg  10 mg Oral BID    zinc sulfate (ZINCATE) capsule 220 mg  220 mg Oral Daily    Followed by   Page Coreas ON 8/18/2021] multivitamin-minerals (CENTRUM ADULTS) tablet 1 tablet  1 tablet Oral Daily    ondansetron (ZOFRAN) injection 4 mg  4 mg Intravenous Q6H PRN    pantoprazole (PROTONIX) EC tablet 40 mg  40 mg Oral Early Morning    polyethylene glycol (MIRALAX) packet 17 g  17 g Oral Daily PRN       No Known Allergies    OBJECTIVE:    Physical Exam  Physical Exam  Vitals and nursing note reviewed  Constitutional:       General: She is awake  Appearance: She is not diaphoretic  Comments: Lying in bed in NAD  Thin, frail elderly female   HENT:      Head: Normocephalic and atraumatic     Eyes:      Comments: No gaze preference Cardiovascular:      Rate and Rhythm: Normal rate  Pulmonary:      Effort: No tachypnea, accessory muscle usage or respiratory distress  Comments: NC in place  Neurological:      General: No focal deficit present  Mental Status: She is alert  Lab Results: I have personally reviewed pertinent labs  , CBC: No results found for: WBC, HGB, HCT, MCV, PLT, ADJUSTEDWBC, MCH, MCHC, RDW, MPV, NRBC, CMP: No results found for: SODIUM, K, CL, CO2, ANIONGAP, BUN, CREATININE, GLUCOSE, CALCIUM, AST, ALT, ALKPHOS, PROT, BILITOT, EGFR, PT/PTT:No results found for: PT, PTT  Imaging Studies: Reviewed  EKG, Pathology, and Other Studies: Reviewed    Counseling / Coordination of Care    Total floor / unit time spent today 15 minutes  Greater than 50% of total time was spent with the patient and / or family counseling and / or coordination of care  A description of the counseling / coordination of care: chart review, symptoms assessment, emotional support  Telephone discussion with patient's daughter  Coordinated plan of care with primary team and CM

## 2021-08-17 NOTE — ASSESSMENT & PLAN NOTE
Malnutrition Findings:   Adult Malnutrition type: Chronic illness  Adult Degree of Malnutrition: Other severe protein calorie malnutrition    BMI Findings:  Adult BMI Classifications: Underweight < 18 5     Body mass index is 14 87 kg/m²  · Chronic, BMI < 15  · Nutrition following however patient has been so lethargic an official speech evaluation has been unable to be made given that the patient cannot even your safely complete a speech eval she certainly cannot tolerate p o  Safely given patient's lack of p o   Intake and family not being interested in pursuing a PEG tube hospice should be discussed with the family, plan to have discussion again on Today Tuesday 8/17  · Patient has been becoming intermittently hypoglycemic due to being NPO, continue to monitor this closely and utilize glucose kits as necessary, maintain on D5 in fluids

## 2021-08-17 NOTE — CASE MANAGEMENT
Case Management Progress Note    Patient name Clint Olson  Location /-04 MRN [id number]  : 1934 Date 2021       LOS (days): 7  Geometric Mean LOS (GMLOS) (days): 5 40  Days to GMLOS:-1 5        BUNDLE:      OBJECTIVE:  Pt is a 80y o  year old , white or  [1], female with Bapt[employer] preference of [employer] admitted on   5:02 PM  Pt is admitted to [facility] [id number] at [address] [place] [address] with complaints of COVID-19   Current admission status: Inpatient  Preferred Pharmacy:   [address] [place] [address], [facility] [address]  Phone: [phone] Fax: [phone]    Primary Care Provider: Matt Kramer MD    Primary Insurance: MEDICARE  Secondary Insurance: [facility]    PROGRESS NOTE:    Per SLIM patient and family are agreeable to hospice at home  CM will call family to discuss hospice preference

## 2021-08-17 NOTE — ASSESSMENT & PLAN NOTE
· Blood cultures returned positive from 8/10 --> One positive for S   Aureus susceptible to Ancef the second gram positive cocci, possibly skin kandi contaminate  · Reordered blood cultures x 2 negative for growth at 48 hours  · Given CrCl is 22, patient was given a one time dose of 1000 mg and then continue on 500 mg BID

## 2021-08-18 NOTE — PROGRESS NOTES
[facility]     Progress Note - [address] 1934, 80 y o  female MRN: [id number]  Unit/Bed#: -01 Encounter: [id number]  Primary Care Provider: Luci Montelongo MD   Date and time admitted to hospital: 8/10/2021  5:02 PM    * COVID-19  Assessment & Plan  · Present on admission, known sick contacts within the family, unvaccinated  · Maintained on mild pathway  · Vitamin-D, zinc, vitamin-C x7 days and multivitamin thereafter (on day 7, however, pt not tolerating PO intake and not taking these meds)  · Heparin subQ DVT prophylaxis  · Remdesivir never initiated, as patient was out of the window to receive  · Patient initially on 2 L nasal cannula patient with intermittent hypoxia 88%, however, was increased to 4L overnight; otherwise greater than 90% wean oxygen as appropriate  · Ceftriaxone was started and continued for 4 days, was then transitioned to IV Ceftriaxone due to elevated procalcitonin, blood cultures returned 8/14 1 showing positive for Staph aureus 2nd showing positive for Staph collagenase; repeat blood culture showing no growth for 72 hours  · Stopped Ancef today as patient is being evaluated by Hospice  Received a total of 7 days of IV Abx  · D-dimer increased to 1 12, troponin 0 19, , ESR 79, BNP 2460; will stop checking as patient is now being evaluated by hospice  · Initial goals of care discussion was held on 08/11 it was discussed that patient is unable to tolerate p o  Safely and family is not interested in PEG tube placement (discussed with daughter 8/14 this decision was made as it was felt this is a life prolonging measure and would not comply with the tp DNR/DNI)  Patient's code status was updated to DNR/DNI  Palliative care consult was placed    Will continue ongoing goals of care discussion with family  · 8/13: Patient has had little to no improvement over the last 48 hours regarding her mentation she does open her eyes to her name but she is not answered any questions and has essentially been nonverbal for last 48 hours her oxygen is stable on 1 L  Her heart rate did start to dip into the 40s this afternoon  · From 8/14-8/15 pt opened eyes to name and was able to answer that she was not having pain  She does respond much more alertly when spoken to in [place]  Still unable to be oriented however  Had a discussion with both pt's daughters and they are considering hospice at [facility] at this time  · A final decision will be made during discussion 8/17 regarding hospice; palliative care following recommendations appreciated  · Patient is essentially bed-bound and wheelchair-bound at baseline    Hypokalemia  Assessment & Plan  · Noted this admission  · Patient status post replete  · Potassium currently 3 8  · No further lab draws  Bacteremia due to Staphylococcus  Assessment & Plan  · Blood cultures returned positive from 8/10 --> One positive for S  Aureus susceptible to Ancef the second gram positive cocci, possibly skin kandi contaminate  · Reordered blood cultures x 2 negative for growth at 72 hours  · Ancef discontinued, completed 7 days of treatment  Multiple open wounds of left lower leg  Assessment & Plan  · Patient has several wounds on bilateral feet and calves that are chronic in nature wound care evaluated could consider podiatry consult however given patient's advanced age, dementia, poor functional status will hold off on any additional consult at this time  · Continue local wound care    Acute respiratory failure with hypoxia ([address] )  Assessment & Plan  · Per ER, slightly hypoxic, now maintaining mid 90s 4 L intermittent hypoxia  · In the setting of COVID-19 pneumonia, will monitor closely  · O2 supplement as needed to maintain > 90%    Hypernatremia  Assessment & Plan  · Suspect due to poor oral intake, hypernatremia 159 on admit  · Continue D5W for support  · No further lab draws      Severe protein-calorie malnutrition ([facility] )  Assessment & Plan  Malnutrition Findings:   Adult Malnutrition type: Chronic illness  Adult Degree of Malnutrition: Other severe protein calorie malnutrition    BMI Findings:  Adult BMI Classifications: Underweight < 18 5     Body mass index is 14 87 kg/m²  · Chronic, BMI < 15  · Nutrition following however patient has been so lethargic an official speech evaluation has been unable to be made given that the patient cannot even your safely complete a speech eval she certainly cannot tolerate p o  Safely given patient's lack of p o  Intake and family not being interested in pursuing a PEG tube; hospice was discussed with the family, plan is now to go forward with hospice  · Patient has been becoming intermittently hypoglycemic due to being NPO, continue to monitor this closely and utilize glucose kits as necessary, maintain on D5 in fluids    Parkinson's disease ([facility] )  Assessment & Plan  · Continue home medications: Neupro patch non-formulary (continue if family able to provide), Stalevo non-formulary (substitute sinemet  mg BID), Nameda 28 mg ER non-formulary (substitute Namenda 10 mg BID)    Hypothyroid  Assessment & Plan  · Continue levothyroxine 50 mcg daily      VTE Pharmacologic Prophylaxis: VTE Score: 4 Moderate Risk (Score 3-4) - Pharmacological DVT Prophylaxis Ordered: heparin  Patient Centered Rounds: I performed bedside rounds with nursing staff today  Discussions with Specialists or Other Care Team Provider: Case management    Education and Discussions with Family / Patient: Updated  (daughter) via phone  Time Spent for Care: 20 minutes  More than 50% of total time spent on counseling and coordination of care as described above      Current Length of Stay: 8 day(s)  Current Patient Status: Inpatient   Certification Statement: The patient will continue to require additional inpatient hospital stay due to ongoing treatment in setting of setting hospice up at home  Discharge Plan: Anticipate discharge later today or tomorrow to home  Code Status: Level 3 - DNAR and DNI    Subjective:   Patient sleeping, aroused to my voice when I entered the room  Followed me with her eyes while in the room  Appeared comfortable  No moaning noted  Objective:     Vitals:   Temp (24hrs), Av 9 °F (36 1 °C), Min:95 7 °F (35 4 °C), Max:97 3 °F (36 3 °C)    Temp:  [95 7 °F (35 4 °C)-97 3 °F (36 3 °C)] 95 7 °F (35 4 °C)  HR:  [] 81  Resp:  [20-30] 30  BP: (139-176)/(90-95) 156/93  SpO2:  [89 %-97 %] 95 %  Body mass index is 14 87 kg/m²  Input and Output Summary (last 24 hours): Intake/Output Summary (Last 24 hours) at 2021 1039  Last data filed at 2021 2118  Gross per 24 hour   Intake 1000 ml   Output --   Net 1000 ml       Physical Exam:   Physical Exam  Vitals and nursing note reviewed  Constitutional:       General: She is sleeping  She is not in acute distress  Appearance: She is cachectic  She is ill-appearing  Cardiovascular:      Rate and Rhythm: Normal rate  Pulses: Normal pulses  Pulmonary:      Effort: Pulmonary effort is normal       Breath sounds: Rhonchi present  Abdominal:      General: Bowel sounds are normal       Palpations: Abdomen is soft  Musculoskeletal:         General: Normal range of motion  Skin:     General: Skin is warm and dry  Capillary Refill: Capillary refill takes less than 2 seconds  Neurological:      Mental Status: She is easily aroused  Mental status is at baseline            Additional Data:     Labs:  Results from last 7 days   Lab Units 21  0657 08/15/21  0500 21  0613   WBC Thousand/uL 3 38* 3 59* 6 14   HEMOGLOBIN g/dL 10 7* 10 1* 10 4*   HEMATOCRIT % 34 0* 31 6* 34 8   PLATELETS Thousands/uL 160 144* 125*   BANDS PCT %  --  3  --    NEUTROS PCT %  --   --  84*   LYMPHS PCT %  --   --  13*   LYMPHO PCT %  --  10*  --    MONOS PCT %  --   --  2*   MONO PCT % --  1*  --    EOS PCT %  --  0 0     Results from last 7 days   Lab Units 08/16/21  0657   SODIUM mmol/L 137   POTASSIUM mmol/L 3 8   CHLORIDE mmol/L 107   CO2 mmol/L 24   BUN mg/dL 10   CREATININE mg/dL 0 62   ANION GAP mmol/L 6   CALCIUM mg/dL 7 8*   ALBUMIN g/dL 1 5*   TOTAL BILIRUBIN mg/dL 0 32   ALK PHOS U/L 84   ALT U/L 25   AST U/L 71*   GLUCOSE RANDOM mg/dL 90         Results from last 7 days   Lab Units 08/18/21  0856 08/18/21  0625 08/18/21  0020 08/17/21  1831 08/17/21  1159 08/17/21  0716 08/16/21  2338 08/16/21  1755 08/16/21  1141 08/16/21  0655 08/16/21  0012 08/15/21  1739   POC GLUCOSE mg/dl 152* 167* 177* 104 128 112 126 124 147* 93 92 81         Results from last 7 days   Lab Units 08/16/21  0657 08/15/21  0500   PROCALCITONIN ng/ml 0 27* 0 38*       Lines/Drains:  Invasive Devices     Peripheral Intravenous Line            Peripheral IV 08/15/21 Left;Proximal;Upper;Ventral (anterior) Arm 2 days                      Imaging: No pertinent imaging reviewed  Recent Cultures (last 7 days):   Results from last 7 days   Lab Units 08/14/21  1134   BLOOD CULTURE  No Growth at 72 hrs  No Growth at 72 hrs         Last 24 Hours Medication List:   Current Facility-Administered Medications   Medication Dose Route Frequency Provider Last Rate    acetaminophen  325 mg Rectal Q4H PRN Coby Turcios PA-C      acetaminophen  650 mg Oral Q6H PRN Vargas Gu PA-C      aluminum-magnesium hydroxide-simethicone  30 mL Oral Q6H PRN Hilary Segovia PA-C      aspirin  81 mg Oral Daily Hilary Segovia PA-C      carbidopa-levodopa  1 tablet Oral BID Hilary Segovia PA-C      cholecalciferol  2,000 Units Oral Daily Vargas Gu PA-C      dextrose  75 mL/hr Intravenous Continuous Vargas Gu PA-C 75 mL/hr (08/17/21 2118)    ezetimibe  10 mg Oral HS Vargas Gu PA-C      heparin (porcine)  5,000 Units Subcutaneous [facility] Vargas Gu PA-C      levothyroxine  50 mcg Oral Early Morning Surinder Lot NAVIN Segovia PA-C      memantine  10 mg Oral BID Hilary Segovia PA-C      multivitamin-minerals  1 tablet Oral Daily Yany Silvaing, [place]      ondansetron  4 mg Intravenous Q6H PRN Yany Hamming, PA-LÓPEZ      pantoprazole  40 mg Oral Early Morning Yany Hamming, PA-LÓPEZ      polyethylene glycol  17 g Oral Daily PRN Yany Elizabeth, ARMAAN          Today, Patient Was Seen By: KASHMIR Menard    **Please Note: This note may have been constructed using a voice recognition system  **

## 2021-08-18 NOTE — CASE MANAGEMENT
Case Management Discharge Planning Note    Patient name Celestine Roche  Location /-43 MRN [id number]  : 1934 Date 2021       Current Admission Date: 8/10/2021  Current Admission Diagnosis:  COVID-19   Patient Active Problem List   Diagnosis    Slow transit constipation    Orbital fracture ([address] )    Maxillary fracture ([address] )    Zygomatic fracture ([address] )    Fall    Facial laceration    Diarrhea    Memory deficit    Hypothyroid    HLD (hyperlipidemia)    Osteoporosis    Parkinson's disease [facility]    Ambulatory dysfunction    Hematochezia    Severe protein-calorie malnutrition (HCC)    COVID-19    Hypernatremia    Acute respiratory failure with hypoxia (HCC)    Multiple open wounds of left lower leg    Bacteremia due to Staphylococcus    Hypokalemia    Previous Admission - Discharge Date:10/24/20   LOS (days): 8  Geometric Mean LOS (GMLOS) (days): 5 40  Days to GMLOS:-2 4 Previous Discharge Diagnosis:  There are no discharge diagnoses documented for the most recent discharge       Risk of Unplanned Readmission Score  Predictive Model Details          16 (Low)  Factor Value    Calculated 2021 12:05 22% Number of active Rx orders 27    Risk of Unplanned Readmission Model 11% Latest calcium low (7 8 mg/dL)     9% Diagnosis of electrolyte disorder present     9% Current length of stay 7 674 days     8% Age 80     8% Imaging order present in last 6 months     7% Latest hemoglobin low (10 7 g/dL)     7% Phosphorous result present     7% Number of ED visits in last six months 1     6% Number of hospitalizations in last year 1     5% Active anticoagulant Rx order present     1% Active ulcer medication Rx order present         BUNDLE:      OBJECTIVE:  Pt is a 80y o  year old , white or  [1], female with Hoahaoism preference of Taoist admitted on   5:02 PM  Pt is admitted to [facility] [address]01 at [address] [place] [address] with complaints of COVID-19    Current admission status: Inpatient  Preferred Pharmacy:   [address] [place], [address] [address] [place] [address]  Phone: [phone] Fax: [phone]    Primary Care Provider: Jose Fong MD    Primary Insurance: MEDICARE  Secondary Insurance: [employer]    DISCHARGE DETAILS:    Discharge planning discussed with[de-identified] trae (CM called POPUJA Jesus on cell and left message for call back  CM called home phone and selvin monique answered  She will talk to Manny Mcdaniel and call CM back with alternate plan if [facility] inpatient hospice does not accept )  Freedom of Choice: Yes (patient and family are agreeable to inpatient hospice - [facility])    Contacts  Patient Contacts: selvin monique  Realtionship to Patient[de-identified] Family  Contact Method: Phone  Phone Number: see facesheet  Reason/Outcome: Continuity of Care, Discharge [address]         Is the patient interested in [facility] at discharge?: No    DME Referral Provided  Referral made for DME?: No         We would like to be able to fill any required prescriptions on discharge at our [facility] and have them delivered to you at discharge in your room    Would you like to participate in this program? : No - Declined    Discharge Destination Plan[de-identified] Hospice ([facility]-pending acceptance referral sent)  Transportation at Discharge?: Yes  Type of Transport: BLS Ambulance

## 2021-08-18 NOTE — ASSESSMENT & PLAN NOTE
· Per ER, slightly hypoxic, now maintaining mid 90s 4 L intermittent hypoxia  · In the setting of COVID-19 pneumonia, will monitor closely  · O2 supplement as needed to maintain > 90%

## 2021-08-18 NOTE — ASSESSMENT & PLAN NOTE
· Present on admission, known sick contacts within the family, unvaccinated  · Maintained on mild pathway  · Vitamin-D, zinc, vitamin-C x7 days and multivitamin thereafter (on day 7, however, pt not tolerating PO intake and not taking these meds)  · Heparin subQ DVT prophylaxis  · Remdesivir never initiated, as patient was out of the window to receive  · Patient initially on 2 L nasal cannula patient with intermittent hypoxia 88%, however, was increased to 4L overnight; otherwise greater than 90% wean oxygen as appropriate  · Ceftriaxone was started and continued for 4 days, was then transitioned to IV Ceftriaxone due to elevated procalcitonin, blood cultures returned 8/14 1 showing positive for Staph aureus 2nd showing positive for Staph collagenase; repeat blood culture showing no growth for 72 hours  · Stopped Ancef today as patient is being evaluated by Hospice  Received a total of 7 days of IV Abx  · D-dimer increased to 1 12, troponin 0 19, , ESR 79, BNP 2460; will stop checking as patient is now being evaluated by hospice  · Initial goals of care discussion was held on 08/11 it was discussed that patient is unable to tolerate p o  Safely and family is not interested in PEG tube placement (discussed with daughter 8/14 this decision was made as it was felt this is a life prolonging measure and would not comply with the tp DNR/DNI)  Patient's code status was updated to DNR/DNI  Palliative care consult was placed  Will continue ongoing goals of care discussion with family  · 8/13: Patient has had little to no improvement over the last 48 hours regarding her mentation she does open her eyes to her name but she is not answered any questions and has essentially been nonverbal for last 48 hours her oxygen is stable on 1 L  Her heart rate did start to dip into the 40s this afternoon  · From 8/14-8/15 pt opened eyes to name and was able to answer that she was not having pain   She does respond much more alertly when spoken to in [address]   Still unable to be oriented however  Had a discussion with both pt's daughters and they are considering hospice at [facility] at this time     · A final decision will be made during discussion 8/17 regarding hospice; palliative care following recommendations appreciated  · Patient is essentially bed-bound and wheelchair-bound at baseline

## 2021-08-18 NOTE — ASSESSMENT & PLAN NOTE
· Suspect due to poor oral intake, hypernatremia 159 on admit  · Continue D5W for support  · No further lab draws

## 2021-08-18 NOTE — ASSESSMENT & PLAN NOTE
Malnutrition Findings:   Adult Malnutrition type: Chronic illness  Adult Degree of Malnutrition: Other severe protein calorie malnutrition    BMI Findings:  Adult BMI Classifications: Underweight < 18 5     Body mass index is 14 87 kg/m²  · Chronic, BMI < 15  · Nutrition following however patient has been so lethargic an official speech evaluation has been unable to be made given that the patient cannot even your safely complete a speech eval she certainly cannot tolerate p o  Safely given patient's lack of p o  Intake and family not being interested in pursuing a PEG tube; hospice was discussed with the family, plan is now to go forward with hospice    · Patient has been becoming intermittently hypoglycemic due to being NPO, continue to monitor this closely and utilize glucose kits as necessary, maintain on D5 in fluids

## 2021-08-18 NOTE — ASSESSMENT & PLAN NOTE
· Noted this admission  · Patient status post replete  · Potassium currently 3 8  · No further lab draws

## 2021-08-18 NOTE — ASSESSMENT & PLAN NOTE
· Blood cultures returned positive from 8/10 --> One positive for S  Aureus susceptible to Ancef the second gram positive cocci, possibly skin kandi contaminate  · Reordered blood cultures x 2 negative for growth at 72 hours  · Ancef discontinued, completed 7 days of treatment

## 2021-08-18 NOTE — PROGRESS NOTES
Called to bedside by nursing staff for increased WOB and increasing oxygen demands  Increased WOB noted on exam  Inspiratory BBS diminished with expiratory rhonchi diffusely  O2 sat 90% on 6L  Does open eyes and look at me in response to calling her name  Non-verbal at baseline per notes  Per records code status is currently DNR/DNI with plan to transition to hospice  Palliative care on board  Spoke with daughter Eduardo Fraire via phone and updated her on declining condition  Verbalizes understanding

## 2021-08-19 NOTE — PROGRESS NOTES
[facility]     Progress Note - [address] 1934, 80 y o  female MRN: [id number]  Unit/Bed#: -01 Encounter: [id number]  Primary Care Provider: Radha Livingston MD   Date and time admitted to hospital: 8/10/2021  5:02 PM    * COVID-19  Assessment & Plan  · Present on admission, known sick contacts within the family, unvaccinated  · Currently: After multiple goals of care conversations, family has agreed to patient being on hospice at this time  Currently, In process of getting patient set up with hospice  Was denied for inpatient hospice care  · Previously;   · Maintained on mild pathway  · Vitamin-D, zinc, vitamin-C x7 days and multivitamin thereafter (on day 7, however, pt not tolerating PO intake and not taking these meds)  · Heparin subQ DVT prophylaxis  · Remdesivir never initiated, as patient was out of the window to receive  · Patient initially on 2 L nasal cannula patient with intermittent hypoxia 88%, however, was increased to 4L overnight; otherwise greater than 90% wean oxygen as appropriate  · Ceftriaxone was started and continued for 4 days, was then transitioned to IV Ceftriaxone due to elevated procalcitonin, blood cultures returned 8/14 1 showing positive for Staph aureus 2nd showing positive for Staph collagenase; repeat blood culture showing no growth for 72 hours  · Stopped Ancef today as patient is being evaluated by Hospice  Received a total of 7 days of IV Abx  · D-dimer increased to 1 12, troponin 0 19, , ESR 79, BNP 2460; will stop checking as patient is now being evaluated by hospice  · Initial goals of care discussion was held on 08/11 it was discussed that patient is unable to tolerate p o  Safely and family is not interested in PEG tube placement (discussed with daughter 8/14 this decision was made as it was felt this is a life prolonging measure and would not comply with the tp DNR/DNI)    Patient's code status was updated to DNR/DNI  Palliative care consult was placed  Will continue ongoing goals of care discussion with family  · 8/13: Patient has had little to no improvement over the last 48 hours regarding her mentation she does open her eyes to her name but she is not answered any questions and has essentially been nonverbal for last 48 hours her oxygen is stable on 1 L  Her heart rate did start to dip into the 40s this afternoon  · From 8/14-8/15 pt opened eyes to name and was able to answer that she was not having pain  She does respond much more alertly when spoken to in [address]  Still unable to be oriented however  Had a discussion with both pt's daughters and they are considering hospice at [facility] at this time  · A final decision will be made during discussion 8/17 regarding hospice; palliative care following recommendations appreciated  · Patient is essentially bed-bound and wheelchair-bound at baseline    Hypokalemia  Assessment & Plan  · Noted this admission  · Patient status post replete  · Potassium currently 3 8  · No further lab draws  Bacteremia due to Staphylococcus  Assessment & Plan  · Blood cultures returned positive from 8/10 --> One positive for S  Aureus susceptible to Ancef the second gram positive cocci, possibly skin kandi contaminate  · Reordered blood cultures x 2 negative for growth at 72 hours  · Ancef discontinued, completed 7 days of treatment      Multiple open wounds of left lower leg  Assessment & Plan  · Patient has several wounds on bilateral feet and calves that are chronic in nature wound care evaluated could consider podiatry consult however given patient's advanced age, dementia, poor functional status will hold off on any additional consult at this time  · Continue local wound care    Acute respiratory failure with hypoxia ([address] )  Assessment & Plan  · Per ER, slightly hypoxic, now maintaining mid 90s 4 L intermittent hypoxia  · In the setting of COVID-19 pneumonia  · O2 supplement as needed to maintain > 90%    Hypernatremia  Assessment & Plan  · Suspect due to poor oral intake, hypernatremia 159 on admit  · Continue D5W for support  · No further lab draws  Severe protein-calorie malnutrition ([facility] )  Assessment & Plan  Malnutrition Findings:   Adult Malnutrition type: Chronic illness  Adult Degree of Malnutrition: Other severe protein calorie malnutrition    BMI Findings:  Adult BMI Classifications: Underweight < 18 5     Body mass index is 14 87 kg/m²  · Chronic, BMI < 15  · Nutrition following however patient has been so lethargic an official speech evaluation has been unable to be made given that the patient cannot even your safely complete a speech eval she certainly cannot tolerate p o  Safely given patient's lack of p o  Intake and family not being interested in pursuing a PEG tube; hospice was discussed with the family, plan is now to go forward with hospice  · Patient has been becoming intermittently hypoglycemic due to being NPO, continue to monitor this closely and utilize glucose kits as necessary, maintain on D5 in fluids    Parkinson's disease ([facility] )  Assessment & Plan  · Continue home medications: Neupro patch non-formulary (continue if family able to provide), Stalevo non-formulary (substitute sinemet  mg BID), Nameda 28 mg ER non-formulary (substitute Namenda 10 mg BID)  · Unfortunately, patient has not been taking any medications by mouth  · Transitioning to hospice  Hypothyroid  Assessment & Plan  · Continue levothyroxine 50 mcg daily    VTE Pharmacologic Prophylaxis: VTE Score: 4 Moderate Risk (Score 3-4) - Pharmacological DVT Prophylaxis Ordered: heparin  Patient Centered Rounds: I performed bedside rounds with nursing staff today  Discussions with Specialists or Other Care Team Provider: Case management    Education and Discussions with Family / Patient: Updated  (daughter) via phone  Time Spent for Care: 20 minutes   More than 50% of total time spent on counseling and coordination of care as described above  Current Length of Stay: 9 day(s)  Current Patient Status: Inpatient   Certification Statement: The patient will continue to require additional inpatient hospital stay due to pending location determined on acceptance for hospice  Discharge Plan: Anticipate discharge later today or tomorrow to Hospice; location pending  pending acceptance  Code Status: Level 3 - DNAR and DNI    Subjective:   Patient resting in bed  Appears comfortable  Opens her eyes to voice  No moaning noted  No respiratory distress noted  Objective:     Vitals:   Temp (24hrs), Av 4 °F (36 3 °C), Min:97 2 °F (36 2 °C), Max:97 5 °F (36 4 °C)    Temp:  [97 2 °F (36 2 °C)-97 5 °F (36 4 °C)] 97 5 °F (36 4 °C)  HR:  [64-79] 69  Resp:  [20-26] 20  BP: (154-163)/(76-93) 162/76  SpO2:  [91 %-95 %] 92 %  Body mass index is 14 87 kg/m²  Input and Output Summary (last 24 hours): Intake/Output Summary (Last 24 hours) at 2021 1059  Last data filed at 2021 1927  Gross per 24 hour   Intake 0 ml   Output --   Net 0 ml       Physical Exam:   Physical Exam  Vitals and nursing note reviewed  Constitutional:       General: She is sleeping  She is not in acute distress  Appearance: She is cachectic  She is ill-appearing  HENT:      Mouth/Throat:      Mouth: Mucous membranes are dry  Cardiovascular:      Rate and Rhythm: Normal rate  Pulses: Normal pulses  Heart sounds: Normal heart sounds  Pulmonary:      Effort: Pulmonary effort is normal       Breath sounds: Decreased breath sounds present  Abdominal:      General: Bowel sounds are normal       Palpations: Abdomen is soft  Skin:     General: Skin is warm and dry  Capillary Refill: Capillary refill takes less than 2 seconds  Neurological:      Mental Status: She is easily aroused  Mental status is at baseline            Additional Data:     Labs:  Results from last 7 days   Lab Units 08/16/21  0657 08/15/21  0500 08/13/21  0613   WBC Thousand/uL 3 38* 3 59* 6 14   HEMOGLOBIN g/dL 10 7* 10 1* 10 4*   HEMATOCRIT % 34 0* 31 6* 34 8   PLATELETS Thousands/uL 160 144* 125*   BANDS PCT %  --  3  --    NEUTROS PCT %  --   --  84*   LYMPHS PCT %  --   --  13*   LYMPHO PCT %  --  10*  --    MONOS PCT %  --   --  2*   MONO PCT %  --  1*  --    EOS PCT %  --  0 0     Results from last 7 days   Lab Units 08/16/21  0657   SODIUM mmol/L 137   POTASSIUM mmol/L 3 8   CHLORIDE mmol/L 107   CO2 mmol/L 24   BUN mg/dL 10   CREATININE mg/dL 0 62   ANION GAP mmol/L 6   CALCIUM mg/dL 7 8*   ALBUMIN g/dL 1 5*   TOTAL BILIRUBIN mg/dL 0 32   ALK PHOS U/L 84   ALT U/L 25   AST U/L 71*   GLUCOSE RANDOM mg/dL 90         Results from last 7 days   Lab Units 08/19/21  0605 08/19/21  0025 08/18/21  1756 08/18/21  1127 08/18/21  0856 08/18/21  0625 08/18/21  0020 08/17/21  1831 08/17/21  1159 08/17/21  0716 08/16/21  2338 08/16/21  1755   POC GLUCOSE mg/dl 148* 112 138 164* 152* 167* 177* 104 128 112 126 124         Results from last 7 days   Lab Units 08/16/21  0657 08/15/21  0500   PROCALCITONIN ng/ml 0 27* 0 38*       Lines/Drains:  Invasive Devices     Peripheral Intravenous Line            Peripheral IV 08/15/21 Left;Proximal;Upper;Ventral (anterior) Arm 3 days                      Imaging: No pertinent imaging reviewed  Recent Cultures (last 7 days):   Results from last 7 days   Lab Units 08/14/21  1134   BLOOD CULTURE  No Growth After 4 Days  No Growth After 4 Days         Last 24 Hours Medication List:   Current Facility-Administered Medications   Medication Dose Route Frequency Provider Last Rate    acetaminophen  325 mg Rectal Q4H PRN Yazan Riley PA-C      acetaminophen  650 mg Oral Q6H PRN Ynay Johnson PA-C      aluminum-magnesium hydroxide-simethicone  30 mL Oral Q6H PRN Hilary Segovia PA-C      aspirin  81 mg Oral Daily Hilary Segovia PA-C      carbidopa-levodopa  1 tablet Oral BID Valeta Netpaloma, PA-C      cholecalciferol  2,000 Units Oral Daily Valcruz Anaya [place]      dextrose  75 mL/hr Intravenous Continuous Valeta Héctor, PA-C 75 mL/hr (08/19/21 1051)    ezetimibe  10 mg Oral HS Valeta Netpaloma, PA-C      heparin (porcine)  5,000 Units Subcutaneous [place], [place]      levothyroxine  50 mcg Oral Early Morning Valcruz Anaya [place]      memantine  10 mg Oral BID Hilary Segovia PA-C      multivitamin-minerals  1 tablet Oral Daily Ashish Anaya [place]      ondansetron  4 mg Intravenous Q6H PRN Valcruz Anaya, PA-LÓPEZ      pantoprazole  40 mg Oral Early Morning ValDAREK Contreras-LÓPEZ      polyethylene glycol  17 g Oral Daily PRN Ashish Anaya PA-C          Today, Patient Was Seen By: KASHMIR Crawford    **Please Note: This note may have been constructed using a voice recognition system  **

## 2021-08-19 NOTE — ASSESSMENT & PLAN NOTE
· Continue home medications: Neupro patch non-formulary (continue if family able to provide), Stalevo non-formulary (substitute sinemet  mg BID), Nameda 28 mg ER non-formulary (substitute Namenda 10 mg BID)  · Unfortunately, patient has not been taking any medications by mouth  · Transitioning to hospice

## 2021-08-19 NOTE — CASE MANAGEMENT
Case Management Progress Note    Patient name Misha Edmonds  Location /-80 MRN [id number]  : 1934 Date 2021       LOS (days): 9  Geometric Mean LOS (GMLOS) (days): 5 40  Days to GMLOS:-3 4        BUNDLE:      OBJECTIVE:  Pt is a 80y o  year old , white or  [1], female with Shinto preference of Alevism admitted on   5:02 PM  Pt is admitted to [place] [id number] at [address] [place] [address] with complaints of COVID-19   Current admission status: Inpatient  Preferred Pharmacy:   [facility] [id number], [address] [place] [id number]  Phone: [phone] Fax: [phone]    Primary Care Provider: Jaime Fong MD    Primary Insurance: MEDICARE  Secondary Insurance: [facility]    PROGRESS NOTE:    CM phoned and spoke to [facility] at [phone] at [facility] inpatient  She said patient was evaluated this am and patient was denied  CM sent referral to [place] as dtjazmin-Eveline states she will need patient in a facility if patient was denied for hospice house  CM awaits acceptance from [facility]  Treatment team informed

## 2021-08-19 NOTE — ASSESSMENT & PLAN NOTE
· Present on admission, known sick contacts within the family, unvaccinated  · Currently: After multiple goals of care conversations, family has agreed to patient being on hospice at this time  Currently, In process of getting patient set up with hospice  Was denied for inpatient hospice care  · Previously;   · Maintained on mild pathway  · Vitamin-D, zinc, vitamin-C x7 days and multivitamin thereafter (on day 7, however, pt not tolerating PO intake and not taking these meds)  · Heparin subQ DVT prophylaxis  · Remdesivir never initiated, as patient was out of the window to receive  · Patient initially on 2 L nasal cannula patient with intermittent hypoxia 88%, however, was increased to 4L overnight; otherwise greater than 90% wean oxygen as appropriate  · Ceftriaxone was started and continued for 4 days, was then transitioned to IV Ceftriaxone due to elevated procalcitonin, blood cultures returned 8/14 1 showing positive for Staph aureus 2nd showing positive for Staph collagenase; repeat blood culture showing no growth for 72 hours  · Stopped Ancef today as patient is being evaluated by Hospice  Received a total of 7 days of IV Abx  · D-dimer increased to 1 12, troponin 0 19, , ESR 79, BNP 2460; will stop checking as patient is now being evaluated by hospice  · Initial goals of care discussion was held on 08/11 it was discussed that patient is unable to tolerate p o  Safely and family is not interested in PEG tube placement (discussed with daughter 8/14 this decision was made as it was felt this is a life prolonging measure and would not comply with the tp DNR/DNI)  Patient's code status was updated to DNR/DNI  Palliative care consult was placed    Will continue ongoing goals of care discussion with family  · 8/13: Patient has had little to no improvement over the last 48 hours regarding her mentation she does open her eyes to her name but she is not answered any questions and has essentially been nonverbal for last 48 hours her oxygen is stable on 1 L  Her heart rate did start to dip into the 40s this afternoon  · From 8/14-8/15 pt opened eyes to name and was able to answer that she was not having pain  She does respond much more alertly when spoken to in [place]  Still unable to be oriented however  Had a discussion with both pt's daughters and they are considering hospice at [facility] at this time     · A final decision will be made during discussion 8/17 regarding hospice; palliative care following recommendations appreciated  · Patient is essentially bed-bound and wheelchair-bound at baseline

## 2021-08-19 NOTE — ASSESSMENT & PLAN NOTE
· Per ER, slightly hypoxic, now maintaining mid 90s 4 L intermittent hypoxia  · In the setting of COVID-19 pneumonia  · O2 supplement as needed to maintain > 90%

## 2021-08-20 PROBLEM — Z51.5 HOSPICE CARE: Status: ACTIVE | Noted: 2021-01-01

## 2021-08-20 NOTE — ASSESSMENT & PLAN NOTE
· Per ER, slightly hypoxic, was on supplemental oxygen  · Now on hospice; continue as needed    · In the setting of COVID-19 pneumonia

## 2021-08-20 NOTE — NURSING NOTE
Rectal temp 94 4, SLIM notified, patient placed on bear hugger  Patient alert, nonverbal at baseline  Will continue to monitor

## 2021-08-20 NOTE — CASE MANAGEMENT
Case Management Progress Note    Patient name Yi Polanco  Location /-98 MRN [id number]  : 1934 Date 2021       LOS (days): 10  Geometric Mean LOS (GMLOS) (days): 5 40  Days to GMLOS:-4 2        BUNDLE:      OBJECTIVE:  Pt is a 80y o  year old , white or  [1], female with Anabaptism preference of Yarsanism admitted on 3/52/6647  5:02 PM  Pt is admitted to [id number] at [address] [place] [address] with complaints of COVID-19   Current admission status: Inpatient  Preferred Pharmacy:   [address] [place] [address], [address] [place] [id number]  Phone: [phone] Fax: [phone]    Primary Care Provider: Mary Ramírez MD    Primary Insurance: MEDICARE  Secondary Insurance: [facility]    PROGRESS NOTE:    Per SLIM patient will transition soon and is currently not doing well  SLIM states she would like patient on GIP  CM sent referral to [address] [place] [address] and requested GIP  CM is awaiting acceptance from [address] [place] [address]

## 2021-08-20 NOTE — HOSPICE NOTE
Called daughter Myriam Smith to let her know her mother was approved for our [place] [address]  She stated she is very tired at the moment and needs to lay down  I told her her mother would not be able to be transferred until she signed the consents  She stated I could call her back later this evening in between 530-6  She said I could also call her sister Irish Osler  I called her and no answer, had to LV   I updated Olimpia Lauren

## 2021-08-20 NOTE — WOUND OSTOMY CARE
Progress Note - Wound   Celestine Roche 80 y o  female MRN: [id number]  Unit/Bed#: -01 Encounter: [id number]      Assessment:   Patient admitted due to COVID-19 infection  History of MARY, HTN, osteoporosis, and Parkinson's disease  Patient has been placed on comfort care, wound care will sign off at this time and recommend to continue wound care as ordered for comfort measures only and as patient tolerates  Family wishes to continue with comfort measures only  Plan:   1  Apply hydraguard to b/l buttocks BID and PRN for prevention and protection  2  Apply skin nourishing cream the entire skin daily for moisture  3  Turn and reposition patient every 2 hours or as patient tolerates for comfort  4  Elevate heels off of bed with pillows to offload pressure   5  Apply EHOB waffle cushion to chair when OOB, if able  6  Continue with p-500 mattress  7  Paint eschars to L medial/anterior foot and R anterior foot daily  Allow to dry  Top with 4x4 gauze  Secure dressings loosely with mikhail (can be combined in the wrapping the b/l heel wounds)  Change every other day and PRN for soilage/dislodgement  8  Cleanse sacral wounds with soap and water, pat dry and apply alleyvn foam dressing  Soren dressing with T  Peel back and assess the skin every shift and as needed  Change every other day and as needed for soilage/dislodgement  If patient soils dressing too often (greater than once per shift) please d/c the dressing and use hydraguard cream TID and PRN  9  Cleanse b/l heel wounds with NSS, pat dry, and apply maxorb silver to the wound beds  Cover with ABD pads and secure the dressings loosely with mikhail wrap  Change every other day and as needed for soilage/dislodgement  10  Wound care will sign off, please call or tiger text with questions and concerns  11  Apply Allevyn foam to mid back bony prominence, soren w/P, peel foam check skin integrity q-shift   Change q3d and PRN for soilage/dislodgement       Wound Care will sign off  Call or Tigertext with any questions    Jalen Diana RN BSN  Wound care

## 2021-08-20 NOTE — HOSPICE NOTE
Patient in bed with noticeable dyspne and RR at 36 and patient was recently increased on her O2 flow to 6L and IV comfort meds orders place and started  Staff nurse administer Diluadid PRN dose and review use of PRN Ativan for better symptom management  Patient remains on Scapolomine patch with some what positive effect  Case reviewed with Dr Skinner and patient approved for IPU at [facility]  Waiting for family to sign consents

## 2021-08-20 NOTE — ASSESSMENT & PLAN NOTE
· Continue home medications: Neupro patch non-formulary (continue if family able to provide), Stalevo non-formulary (substitute sinemet  mg BID), Nameda 28 mg ER non-formulary (substitute Namenda 10 mg BID)  · Unfortunately, patient has not been taking any medications by mouth  · Transitioning to hospice, medications discontinued

## 2021-08-20 NOTE — ASSESSMENT & PLAN NOTE
Malnutrition Findings:   Adult Malnutrition type: Chronic illness  Adult Degree of Malnutrition: Other severe protein calorie malnutrition    BMI Findings:  Adult BMI Classifications: Underweight < 18 5     Body mass index is 14 87 kg/m²  · Chronic, BMI < 15  · Nutrition following however patient has been so lethargic an official speech evaluation has been unable to be made given that the patient cannot even your safely complete a speech eval she certainly cannot tolerate p o  Safely given patient's lack of p o  Intake and family not being interested in pursuing a PEG tube; hospice was discussed with the family, plan is now to go forward with hospice  · All IVF discontinued; patient transitioned to hospice/comfort care

## 2021-08-20 NOTE — ASSESSMENT & PLAN NOTE
· Present on admission, known sick contacts within the family, unvaccinated  · Currently: After multiple goals of care conversations, family has agreed to patient being on hospice at this time  Case management was in the process of getting patient to a facility for hospice; however, patient has been declining, attempting to get hospice in house  · Previously;   · Maintained on mild pathway  · Vitamin-D, zinc, vitamin-C x7 days and multivitamin thereafter (on day 7, however, pt not tolerating PO intake and not taking these meds)  · Heparin subQ DVT prophylaxis  · Remdesivir never initiated, as patient was out of the window to receive  · Patient initially on 2 L nasal cannula patient with intermittent hypoxia 88%, however, was increased to 4L overnight; otherwise greater than 90% wean oxygen as appropriate  · Ceftriaxone was started and continued for 4 days, was then transitioned to IV Ceftriaxone due to elevated procalcitonin, blood cultures returned 8/14 1 showing positive for Staph aureus 2nd showing positive for Staph collagenase; repeat blood culture showing no growth for 72 hours  · Stopped Ancef today as patient is being evaluated by Hospice  Received a total of 7 days of IV Abx  · D-dimer increased to 1 12, troponin 0 19, , ESR 79, BNP 2460; will stop checking as patient is now being evaluated by hospice  · Initial goals of care discussion was held on 08/11 it was discussed that patient is unable to tolerate p o  Safely and family is not interested in PEG tube placement (discussed with daughter 8/14 this decision was made as it was felt this is a life prolonging measure and would not comply with the tp DNR/DNI)  Patient's code status was updated to DNR/DNI  Palliative care consult was placed    Will continue ongoing goals of care discussion with family  · 8/13: Patient has had little to no improvement over the last 48 hours regarding her mentation she does open her eyes to her name but she is not answered any questions and has essentially been nonverbal for last 48 hours her oxygen is stable on 1 L  Her heart rate did start to dip into the 40s this afternoon  · From 8/14-8/15 pt opened eyes to name and was able to answer that she was not having pain  She does respond much more alertly when spoken [place]  Still unable to be oriented however  Had a discussion with both pt's daughters and they are considering hospice at [facility] at this time     · A final decision will be made during discussion 8/17 regarding hospice; palliative care following recommendations appreciated  · Patient is essentially bed-bound and wheelchair-bound at baseline

## 2021-08-20 NOTE — PROGRESS NOTES
[facility]     Progress Note - [address] 1934, 80 y o  female MRN: [id number]  Unit/Bed#: -[facility] Encounter: [id number]  Primary Care Provider: Giovani Auguste MD   Date and time admitted to hospital: 8/10/2021  5:02 PM    * COVID-19  Assessment & Plan  · Present on admission, known sick contacts within the family, unvaccinated  · Currently: After multiple goals of care conversations, family has agreed to patient being on hospice at this time  Case management was in the process of getting patient to a facility for hospice; however, patient has been declining, attempting to get hospice in house  · Previously;   · Maintained on mild pathway  · Vitamin-D, zinc, vitamin-C x7 days and multivitamin thereafter (on day 7, however, pt not tolerating PO intake and not taking these meds)  · Heparin subQ DVT prophylaxis  · Remdesivir never initiated, as patient was out of the window to receive  · Patient initially on 2 L nasal cannula patient with intermittent hypoxia 88%, however, was increased to 4L overnight; otherwise greater than 90% wean oxygen as appropriate  · Ceftriaxone was started and continued for 4 days, was then transitioned to IV Ceftriaxone due to elevated procalcitonin, blood cultures returned 8/14 1 showing positive for Staph aureus 2nd showing positive for Staph collagenase; repeat blood culture showing no growth for 72 hours  · Stopped Ancef today as patient is being evaluated by Hospice  Received a total of 7 days of IV Abx  · D-dimer increased to 1 12, troponin 0 19, , ESR 79, BNP 2460; will stop checking as patient is now being evaluated by hospice  · Initial goals of care discussion was held on 08/11 it was discussed that patient is unable to tolerate p o   Safely and family is not interested in PEG tube placement (discussed with daughter 8/14 this decision was made as it was felt this is a life prolonging measure and would not comply with the tp DNR/DNI)  Patient's code status was updated to DNR/DNI  Palliative care consult was placed  Will continue ongoing goals of care discussion with family  · 8/13: Patient has had little to no improvement over the last 48 hours regarding her mentation she does open her eyes to her name but she is not answered any questions and has essentially been nonverbal for last 48 hours her oxygen is stable on 1 L  Her heart rate did start to dip into the 40s this afternoon  · From 8/14-8/15 pt opened eyes to name and was able to answer that she was not having pain  She does respond much more alertly when spoken to in [address]  Still unable to be oriented however  Had a discussion with both pt's daughters and they are considering hospice at [facility] at this time  · A final decision will be made during discussion 8/17 regarding hospice; palliative care following recommendations appreciated  · Patient is essentially bed-bound and wheelchair-bound at [address] [place] [address]  · Patient transitioned to hospice care; see plan as noted above  Hypokalemia  Assessment & Plan  · Noted this admission  · Patient status post replete  · No further lab draws  Bacteremia due to Staphylococcus  Assessment & Plan  · Blood cultures returned positive from 8/10 --> One positive for S  Aureus susceptible to Ancef the second gram positive cocci, possibly skin kandi contaminate  · Reordered blood cultures x 2 negative for growth at 72 hours  · Ancef discontinued, completed 7 days of treatment      Multiple open wounds of left lower leg  Assessment & Plan  · Patient has several wounds on bilateral feet and calves that are chronic in nature wound care evaluated could consider podiatry consult however given patient's advanced age, dementia, poor functional status will hold off on any additional consult at this time  · Continue local wound care    Acute respiratory failure with hypoxia [facility])  Assessment & Plan  · Per ER, slightly hypoxic, was on supplemental oxygen  · Now on hospice; continue as needed  · In the setting of COVID-19 pneumonia    Hypernatremia  Assessment & Plan  · Suspect due to poor oral intake, hypernatremia 159 on admit  · No further lab draws  Severe protein-calorie malnutrition ([facility] )  Assessment & Plan  Malnutrition Findings:   Adult Malnutrition type: Chronic illness  Adult Degree of Malnutrition: Other severe protein calorie malnutrition    BMI Findings:  Adult BMI Classifications: Underweight < 18 5     Body mass index is 14 87 kg/m²  · Chronic, BMI < 15  · Nutrition following however patient has been so lethargic an official speech evaluation has been unable to be made given that the patient cannot even your safely complete a speech eval she certainly cannot tolerate p o  Safely given patient's lack of p o  Intake and family not being interested in pursuing a PEG tube; hospice was discussed with the family, plan is now to go forward with hospice  · All IVF discontinued; patient transitioned to hospice/comfort care  Parkinson's disease ([facility] )  Assessment & Plan  · Continue home medications: Neupro patch non-formulary (continue if family able to provide), Stalevo non-formulary (substitute sinemet  mg BID), Nameda 28 mg ER non-formulary (substitute Namenda 10 mg BID)  · Unfortunately, patient has not been taking any medications by mouth  · Transitioning to hospice, medications discontinued  Hypothyroid  Assessment & Plan  · She can no longer take medication by mouth; has been discontinued  VTE Pharmacologic Prophylaxis: VTE Score: 4 Moderate Risk (Score 3-4) - Pharmacological DVT Prophylaxis Contraindicated  Sequential Compression Devices Ordered  Patient Centered Rounds: I performed bedside rounds with nursing staff today    Discussions with Specialists or Other Care Team Provider: Case Management    Education and Discussions with Family / Patient: Updated contact person (daughter) via phone  Both daughters, William Alatorre and Bogdan Raymond  Time Spent for Care: 20 minutes  More than 50% of total time spent on counseling and coordination of care as described above  Current Length of Stay: 10 day(s)  Current Patient Status: Inpatient   Certification Statement: The patient will continue to require additional inpatient hospital stay due to ongoing treatment for above stated problems  Discharge Plan: Anticipate discharge in 24-48 hrs to Hospice    Code Status: Level 4 - Comfort Care    Subjective:   Patient resting in bed, sleeping  Opens her eyes to verbal stimulation  Appears comfortable  Is not in acute distress  Objective:     Vitals:   Temp (24hrs), Av 7 °F (35 4 °C), Min:94 1 °F (34 5 °C), Max:98 1 °F (36 7 °C)    Temp:  [94 1 °F (34 5 °C)-98 1 °F (36 7 °C)] 98 1 °F (36 7 °C)  HR:  [59-88] 75  Resp:  [20-28] 28  BP: (127-178)/(55-94) 127/55  SpO2:  [89 %-97 %] 97 %  Body mass index is 14 87 kg/m²  Input and Output Summary (last 24 hours): Intake/Output Summary (Last 24 hours) at 2021 1037  Last data filed at 2021 1300  Gross per 24 hour   Intake 0 ml   Output --   Net 0 ml       Physical Exam:   Physical Exam  Vitals and nursing note reviewed  Constitutional:       General: She is sleeping  She is not in acute distress  Appearance: She is cachectic  She is ill-appearing  Cardiovascular:      Rate and Rhythm: Normal rate  Heart sounds: Normal heart sounds  Pulmonary:      Effort: No respiratory distress  Breath sounds: Rhonchi present  Musculoskeletal:         General: Normal range of motion  Skin:     General: Skin is warm and dry  Capillary Refill: Capillary refill takes less than 2 seconds  Neurological:      Mental Status: Mental status is at baseline            Additional Data:     Labs:  Results from last 7 days   Lab Units 21  0657 08/15/21  0500   WBC Thousand/uL 3 38* 3 59*   HEMOGLOBIN g/dL 10 7* 10 1* HEMATOCRIT % 34 0* 31 6*   PLATELETS Thousands/uL 160 144*   BANDS PCT %  --  3   LYMPHO PCT %  --  10*   MONO PCT %  --  1*   EOS PCT %  --  0     Results from last 7 days   Lab Units 08/16/21  0657   SODIUM mmol/L 137   POTASSIUM mmol/L 3 8   CHLORIDE mmol/L 107   CO2 mmol/L 24   BUN mg/dL 10   CREATININE mg/dL 0 62   ANION GAP mmol/L 6   CALCIUM mg/dL 7 8*   ALBUMIN g/dL 1 5*   TOTAL BILIRUBIN mg/dL 0 32   ALK PHOS U/L 84   ALT U/L 25   AST U/L 71*   GLUCOSE RANDOM mg/dL 90         Results from last 7 days   Lab Units 08/20/21  0610 08/19/21  2355 08/19/21  1746 08/19/21  1212 08/19/21  0605 08/19/21  0025 08/18/21  1756 08/18/21  1127 08/18/21  0856 08/18/21  0625 08/18/21  0020 08/17/21  1831   POC GLUCOSE mg/dl 148* 164* 132 154* 148* 112 138 164* 152* 167* 177* 104         Results from last 7 days   Lab Units 08/16/21  0657 08/15/21  0500   PROCALCITONIN ng/ml 0 27* 0 38*       Lines/Drains:  Invasive Devices     Peripheral Intravenous Line            Peripheral IV 08/15/21 Left;Proximal;Upper;Ventral (anterior) Arm 4 days    Peripheral IV 08/20/21 Dorsal (posterior); Right Wrist <1 day                Imaging: No pertinent imaging reviewed  Recent Cultures (last 7 days):   Results from last 7 days   Lab Units 08/14/21  1134   BLOOD CULTURE  No Growth After 5 Days  No Growth After 5 Days  Last 24 Hours Medication List:   Current Facility-Administered Medications   Medication Dose Route Frequency Provider Last Rate    acetaminophen  325 mg Rectal Q4H PRN Day Bennett PA-C      glycopyrrolate  0 1 mg Intravenous Q4H PRN KASHMIR Mckeon      ondansetron  4 mg Intravenous Q6H PRN Laureen Diaz PA-C      scopolamine  1 patch Transdermal Q72H KASHMIR Mckeon          Today, Patient Was Seen By: KASHMIR Mckeon    **Please Note: This note may have been constructed using a voice recognition system  **

## 2021-08-20 NOTE — QUICK NOTE
Per RN patient's temperature rectally decrease in 94 4 ° F  Does not appear to be in any increased acute distress  Patient still only opening eyes to name, not answering questions as per review of chart patient has been doing      O2 90% on 2 L, was placed on 3 L and currently satting 95%    Currently on [employer], discussed with RN to continue [employer], closely monitor temperature rectally, and wants rectal temperature reach is 97° F can discontinue the [employer]

## 2021-08-21 NOTE — ASSESSMENT & PLAN NOTE
· Present on admission, known sick contacts within the family, unvaccinated  · Currently: After multiple goals of care conversations, family has agreed to patient being on hospice at this time  Case management was in the process of getting patient to a facility for hospice; however, patient has been declining  [facility] was consulted, attempts were made to get back to inpatient hospice  Family resistant to moving her  · Previously;   · Maintained on mild pathway  · Vitamin-D, zinc, vitamin-C x7 days and multivitamin thereafter (on day 7, however, pt not tolerating PO intake and not taking these meds)  · Heparin subQ DVT prophylaxis  · Remdesivir never initiated, as patient was out of the window to receive  · Patient initially on 2 L nasal cannula patient with intermittent hypoxia 88%, however, was increased to 4L overnight; otherwise greater than 90% wean oxygen as appropriate  · Ceftriaxone was started and continued for 4 days, was then transitioned to IV Ceftriaxone due to elevated procalcitonin, blood cultures returned 8/14 1 showing positive for Staph aureus 2nd showing positive for Staph collagenase; repeat blood culture showing no growth for 72 hours  · Stopped Ancef today as patient is being evaluated by Hospice  Received a total of 7 days of IV Abx  · D-dimer increased to 1 12, troponin 0 19, , ESR 79, BNP 2460; will stop checking as patient is now being evaluated by hospice  · Initial goals of care discussion was held on 08/11 it was discussed that patient is unable to tolerate p o  Safely and family is not interested in PEG tube placement (discussed with daughter 8/14 this decision was made as it was felt this is a life prolonging measure and would not comply with the tp DNR/DNI)  Patient's code status was updated to DNR/DNI  Palliative care consult was placed    Will continue ongoing goals of care discussion with family  · 8/13: Patient has had little to no improvement over the last 48 hours regarding her mentation she does open her eyes to her name but she is not answered any questions and has essentially been nonverbal for last 48 hours her oxygen is stable on 1 L  Her heart rate did start to dip into the 40s this afternoon  · From 8/14-8/15 pt opened eyes to name and was able to answer that she was not having pain  She does respond much more alertly when spoken to in [place]  Still unable to be oriented however  Had a discussion with both pt's daughters and they are considering hospice at [facility] at this time     · A final decision will be made during discussion 8/17 regarding hospice; palliative care following recommendations appreciated  · Patient is essentially bed-bound and wheelchair-bound at baseline

## 2021-08-21 NOTE — DISCHARGE SUMMARY
[facility] [facility] [address] 1934, 80 y o  female MRN: [id number]  Unit/Bed#: -[facility] Encounter: [id number]  Primary Care Provider: Duane Sorenson MD   Date and time admitted to hospital: 8/10/2021  5:02 PM    * COVID-19  Assessment & Plan  · Present on admission, known sick contacts within the family, unvaccinated  · Currently: After multiple goals of care conversations, family has agreed to patient being on hospice at this time  Case management was in the process of getting patient to a facility for hospice; however, patient has been declining  [facility] was consulted, attempts were made to get back to inpatient hospice  Family resistant to moving her  · Previously;   · Maintained on mild pathway  · Vitamin-D, zinc, vitamin-C x7 days and multivitamin thereafter (on day 7, however, pt not tolerating PO intake and not taking these meds)  · Heparin subQ DVT prophylaxis  · Remdesivir never initiated, as patient was out of the window to receive  · Patient initially on 2 L nasal cannula patient with intermittent hypoxia 88%, however, was increased to 4L overnight; otherwise greater than 90% wean oxygen as appropriate  · Ceftriaxone was started and continued for 4 days, was then transitioned to IV Ceftriaxone due to elevated procalcitonin, blood cultures returned 8/14 1 showing positive for Staph aureus 2nd showing positive for Staph collagenase; repeat blood culture showing no growth for 72 hours  · Stopped Ancef today as patient is being evaluated by Hospice  Received a total of 7 days of IV Abx  · D-dimer increased to 1 12, troponin 0 19, , ESR 79, BNP 2460; will stop checking as patient is now being evaluated by hospice  · Initial goals of care discussion was held on 08/11 it was discussed that patient is unable to tolerate p o   Safely and family is not interested in PEG tube placement (discussed with daughter 8/14 this decision was made as it was felt this is a life prolonging measure and would not comply with the tp DNR/DNI)  Patient's code status was updated to DNR/DNI  Palliative care consult was placed  Will continue ongoing goals of care discussion with family  · 8/13: Patient has had little to no improvement over the last 48 hours regarding her mentation she does open her eyes to her name but she is not answered any questions and has essentially been nonverbal for last 48 hours her oxygen is stable on 1 L  Her heart rate did start to dip into the 40s this afternoon  · From 8/14-8/15 pt opened eyes to name and was able to answer that she was not having pain  She does respond much more alertly when spoken to in [place]  Still unable to be oriented however  Had a discussion with both pt's daughters and they are considering hospice at [facility] at this time  · A final decision will be made during discussion 8/17 regarding hospice; palliative care following recommendations appreciated  · Patient is essentially bed-bound and wheelchair-bound at [address] [place] [address]  · Patient transitioned to hospice care; see plan as noted above  Hypokalemia  Assessment & Plan  · Noted this admission  · Patient status post replete  · No further lab draws  Bacteremia due to Staphylococcus  Assessment & Plan  · Blood cultures returned positive from 8/10 --> One positive for S  Aureus susceptible to Ancef the second gram positive cocci, possibly skin kandi contaminate  · Reordered blood cultures x 2 negative for growth at 72 hours  · Ancef discontinued, completed 7 days of treatment      Multiple open wounds of left lower leg  Assessment & Plan  · Patient has several wounds on bilateral feet and calves that are chronic in nature wound care evaluated could consider podiatry consult however given patient's advanced age, dementia, poor functional status will hold off on any additional consult at this time  · Continue local wound care    Acute respiratory failure with hypoxia (HCC)  Assessment & Plan  · Per ER, slightly hypoxic, was on supplemental oxygen  · Now on hospice; continue as needed  · In the setting of COVID-19 pneumonia    Hypernatremia  Assessment & Plan  · Suspect due to poor oral intake, hypernatremia 159 on admit  · No further lab draws  Severe protein-calorie malnutrition ([facility] )  Assessment & Plan  Malnutrition Findings:   Adult Malnutrition type: Chronic illness  Adult Degree of Malnutrition: Other severe protein calorie malnutrition    BMI Findings:  Adult BMI Classifications: Underweight < 18 5     Body mass index is 14 87 kg/m²  · Chronic, BMI < 15  · Nutrition following however patient has been so lethargic an official speech evaluation has been unable to be made given that the patient cannot even your safely complete a speech eval she certainly cannot tolerate p o  Safely given patient's lack of p o  Intake and family not being interested in pursuing a PEG tube; hospice was discussed with the family, plan is now to go forward with hospice  · All IVF discontinued; patient transitioned to hospice/comfort care  Parkinson's disease ([facility] )  Assessment & Plan  · Continue home medications: Neupro patch non-formulary (continue if family able to provide), Stalevo non-formulary (substitute sinemet  mg BID), Nameda 28 mg ER non-formulary (substitute Namenda 10 mg BID)  · Unfortunately, patient has not been taking any medications by mouth  · Transitioning to hospice, medications discontinued  Hypothyroid  Assessment & Plan  · She can no longer take medication by mouth; has been discontinued        Medical Problems     Resolved Problems  Date Reviewed: 8/20/2021        Resolved    MARY (acute kidney injury) ([facility] ) 8/13/2021     Resolved by  Nirali Millan, [address]              Discharging Physician / Practitioner: KASHMIR Dillard  PCP: Marc Hawkins MD  Admission Date:   Admission Orders (From admission, onward)     Ordered        08/10/21 1954  Inpatient Admission  Once                   Discharge Date: 08/21/21    Consultations During Hospital Stay:  [id number]  IP CONSULT TO PALLIATIVE CARE  · IP CONSULT TO HOSPICE    Procedures Performed:   · None    Significant Findings / Test Results:   XR chest 1 view portable   Final Result by Soni Larose MD (08/11 3061)      No acute cardiopulmonary disease  In the setting of clinically suspected/proven COVID-19, this plain film appearance does not contain findings that raise concern for viral pneumonia such as COVID-19, but does not rule out this diagnosis  Workstation performed: [id number]         ·     Incidental Findings:   · None     Test Results Pending at Discharge (will require follow up): · None     Outpatient Tests Requested:  · None    Complications:  None    Reason for Admission:  COVID-19; Acute respiratory failure with hypoxia    Hospital Course:   Jesika Yee is a 80 y o  female patient who originally presented to the hospital on 8/10/2021 due to decreased oral intake, fever and confirmed covid-19 infection  Patient with past medical history of Parkinson Disease, Dementia, Hypothyroidism, and Osteoporosis  Patient was initiated on covid-19 mild pathway including vitamins, DVT prophylaxis  Was maintained on supplemental oxygen for support  Was also noted to be hypernatremic on admission, was started on IVF  Was also noted to have MARY on admission as well  Patuent unfortunately was not progressing well, and was slow to respond to treat  Palliative care consulted on 8/12; a goals of care conversation was had with the family  She was unable to tolerate PO, and was recommended for a PEG if they wanted to proceed; however, family refused PEG tube and she was then changed from a level 1 to level 3 DNR/DNI    Patient unfortunately was not improving and another family meeting was held on 8/17 and at that time it was decided that she would be progressed to Hospice  Hospice was consulted, hospice was in the process of getting patient placed in a facility and during this time, she declined overnight to the point where she was not going to be able to leave the hospital   She was initiated on 0 2 mg Diluadid IV Q1H for pain, air  Hunger as well as 0 5 mg Ativan IV Q1H for air hunger, anxiety  She did not require much medication as she was comfortable and pain free  Patient passed away 8/21/21 at 0150  Family notified by overnight AP  Please see above list of diagnoses and related plan for additional information  Condition at Discharge: terminal    Discharge Day Visit / Exam:   * Please refer to separate progress note for these details *    Discussion with Family: Family contacted by stu Nelson Discharge instructions/Information to patient and family:   See after visit summary for information provided to patient and family  Provisions for Follow-Up Care:  See after visit summary for information related to follow-up care and any pertinent home health orders  Disposition:   Other: [facility]    Planned Readmission: None     Discharge Statement:  I spent 35 minutes discharging the patient  This time was spent on the day of discharge  I had direct contact with the patient on the day of discharge  Greater than 50% of the total time was spent examining patient, answering all patient questions, arranging and discussing plan of care with patient as well as directly providing post-discharge instructions  Additional time then spent on discharge activities  Discharge Medications:  See after visit summary for reconciled discharge medications provided to patient and/or family        **Please Note: This note may have been constructed using a voice recognition system**

## 2021-08-21 NOTE — DEATH NOTE
PRONOUNCEMENT OF DEATH     DOA: 08/10/2021    DOD: 2021    TOD: 0150    CODE STATUS AT TOD: Level 4 Comfort Care    PATIENT ON HOSPICE?: No    FAMILY NOTIFIED?: Daughter Dayan Woodruff?: No    SUSPECTED COD: Acute respiratory failure  COVID-19    HOSPITAL PROBLEM LIST:   Patient Active Problem List   Diagnosis    Slow transit constipation    Orbital fracture (HCC)    Maxillary fracture (HCC)    Zygomatic fracture (HCC)    Fall    Facial laceration    Diarrhea    Memory deficit    Hypothyroid    HLD (hyperlipidemia)    Osteoporosis    Parkinson's disease ([facility] )    Ambulatory dysfunction    Hematochezia    Severe protein-calorie malnutrition (HCC)    COVID-19    Hypernatremia    Acute respiratory failure with hypoxia (HCC)    Multiple open wounds of left lower leg    Bacteremia due to Staphylococcus    Hypokalemia    Hospice care       PRONOUNCEMENT OF DEATH    I was contacted by nursing staff to evaluate the patient found without vital signs  Patient was identified visually and identification was confirmed by hospital ID bracelthang  Patient was found laying in bed with no family at bedside  Personally examined the patient without heart sounds auscultated on exam nor were pulses detected x 2  No breath sounds were appreciated after 2 minutes of auscultation  Pupils were fixed and non-reactive to light  Patient was pronounced dead at this date and time  Family is undecided on  home  States the plan on cremation  Please kindly review hospital chart for all details of this hospitalization and circumstances leading to death  Death certificate will be signed my attending physician at a later time